# Patient Record
Sex: MALE | Race: WHITE | NOT HISPANIC OR LATINO | Employment: OTHER | URBAN - METROPOLITAN AREA
[De-identification: names, ages, dates, MRNs, and addresses within clinical notes are randomized per-mention and may not be internally consistent; named-entity substitution may affect disease eponyms.]

---

## 2021-01-15 DIAGNOSIS — Z23 NEED FOR VACCINATION: ICD-10-CM

## 2021-01-26 ENCOUNTER — IMMUNIZATION (OUTPATIENT)
Dept: FAMILY PLANNING/WOMEN'S HEALTH CLINIC | Facility: IMMUNIZATION CENTER | Age: 81
End: 2021-01-26
Attending: INTERNAL MEDICINE
Payer: COMMERCIAL

## 2021-01-26 DIAGNOSIS — Z23 NEED FOR VACCINATION: ICD-10-CM

## 2021-01-26 DIAGNOSIS — Z23 ENCOUNTER FOR VACCINATION: Primary | ICD-10-CM

## 2021-01-26 PROCEDURE — 0001A PFIZER SARS-COV-2 VACCINE: CPT

## 2021-01-26 PROCEDURE — 91300 PFIZER SARS-COV-2 VACCINE: CPT

## 2021-02-16 ENCOUNTER — HOSPITAL ENCOUNTER (OUTPATIENT)
Dept: LAB | Facility: MEDICAL CENTER | Age: 81
End: 2021-02-16
Attending: FAMILY MEDICINE
Payer: COMMERCIAL

## 2021-02-16 LAB
25(OH)D3 SERPL-MCNC: 56 NG/ML (ref 30–100)
ALBUMIN SERPL BCP-MCNC: 3.7 G/DL (ref 3.2–4.9)
ALBUMIN/GLOB SERPL: 1.2 G/DL
ALP SERPL-CCNC: 71 U/L (ref 30–99)
ALT SERPL-CCNC: 20 U/L (ref 2–50)
ANION GAP SERPL CALC-SCNC: 8 MMOL/L (ref 7–16)
AST SERPL-CCNC: 18 U/L (ref 12–45)
BASOPHILS # BLD AUTO: 1.1 % (ref 0–1.8)
BASOPHILS # BLD: 0.08 K/UL (ref 0–0.12)
BILIRUB SERPL-MCNC: 0.8 MG/DL (ref 0.1–1.5)
BUN SERPL-MCNC: 18 MG/DL (ref 8–22)
CALCIUM SERPL-MCNC: 9.4 MG/DL (ref 8.5–10.5)
CHLORIDE SERPL-SCNC: 108 MMOL/L (ref 96–112)
CHOLEST SERPL-MCNC: 160 MG/DL (ref 100–199)
CO2 SERPL-SCNC: 28 MMOL/L (ref 20–33)
CREAT SERPL-MCNC: 1.45 MG/DL (ref 0.5–1.4)
EOSINOPHIL # BLD AUTO: 0.11 K/UL (ref 0–0.51)
EOSINOPHIL NFR BLD: 1.5 % (ref 0–6.9)
ERYTHROCYTE [DISTWIDTH] IN BLOOD BY AUTOMATED COUNT: 50.8 FL (ref 35.9–50)
FASTING STATUS PATIENT QL REPORTED: NORMAL
GLOBULIN SER CALC-MCNC: 3.2 G/DL (ref 1.9–3.5)
GLUCOSE SERPL-MCNC: 98 MG/DL (ref 65–99)
HCT VFR BLD AUTO: 55.5 % (ref 42–52)
HDLC SERPL-MCNC: 40 MG/DL
HGB BLD-MCNC: 17.9 G/DL (ref 14–18)
IMM GRANULOCYTES # BLD AUTO: 0.01 K/UL (ref 0–0.11)
IMM GRANULOCYTES NFR BLD AUTO: 0.1 % (ref 0–0.9)
LDLC SERPL CALC-MCNC: 96 MG/DL
LYMPHOCYTES # BLD AUTO: 3.21 K/UL (ref 1–4.8)
LYMPHOCYTES NFR BLD: 44 % (ref 22–41)
MCH RBC QN AUTO: 31.3 PG (ref 27–33)
MCHC RBC AUTO-ENTMCNC: 32.3 G/DL (ref 33.7–35.3)
MCV RBC AUTO: 97.2 FL (ref 81.4–97.8)
MONOCYTES # BLD AUTO: 0.84 K/UL (ref 0–0.85)
MONOCYTES NFR BLD AUTO: 11.5 % (ref 0–13.4)
NEUTROPHILS # BLD AUTO: 3.04 K/UL (ref 1.82–7.42)
NEUTROPHILS NFR BLD: 41.8 % (ref 44–72)
NRBC # BLD AUTO: 0 K/UL
NRBC BLD-RTO: 0 /100 WBC
PLATELET # BLD AUTO: 204 K/UL (ref 164–446)
PMV BLD AUTO: 10.5 FL (ref 9–12.9)
POTASSIUM SERPL-SCNC: 5.1 MMOL/L (ref 3.6–5.5)
PROT SERPL-MCNC: 6.9 G/DL (ref 6–8.2)
RBC # BLD AUTO: 5.71 M/UL (ref 4.7–6.1)
SODIUM SERPL-SCNC: 144 MMOL/L (ref 135–145)
TRIGL SERPL-MCNC: 121 MG/DL (ref 0–149)
WBC # BLD AUTO: 7.3 K/UL (ref 4.8–10.8)

## 2021-02-16 PROCEDURE — 85025 COMPLETE CBC W/AUTO DIFF WBC: CPT

## 2021-02-16 PROCEDURE — 36415 COLL VENOUS BLD VENIPUNCTURE: CPT

## 2021-02-16 PROCEDURE — 82306 VITAMIN D 25 HYDROXY: CPT

## 2021-02-16 PROCEDURE — 80053 COMPREHEN METABOLIC PANEL: CPT

## 2021-02-16 PROCEDURE — 80061 LIPID PANEL: CPT

## 2021-02-18 ENCOUNTER — IMMUNIZATION (OUTPATIENT)
Dept: FAMILY PLANNING/WOMEN'S HEALTH CLINIC | Facility: IMMUNIZATION CENTER | Age: 81
End: 2021-02-18
Attending: INTERNAL MEDICINE
Payer: COMMERCIAL

## 2021-02-18 DIAGNOSIS — Z23 ENCOUNTER FOR VACCINATION: Primary | ICD-10-CM

## 2021-02-18 PROCEDURE — 0002A PFIZER SARS-COV-2 VACCINE: CPT

## 2021-02-18 PROCEDURE — 91300 PFIZER SARS-COV-2 VACCINE: CPT

## 2021-03-16 ENCOUNTER — HOSPITAL ENCOUNTER (OUTPATIENT)
Dept: LAB | Facility: MEDICAL CENTER | Age: 81
End: 2021-03-16
Attending: UROLOGY
Payer: COMMERCIAL

## 2021-03-16 ENCOUNTER — HOSPITAL ENCOUNTER (OUTPATIENT)
Dept: LAB | Facility: MEDICAL CENTER | Age: 81
End: 2021-03-16
Attending: FAMILY MEDICINE
Payer: COMMERCIAL

## 2021-03-16 PROCEDURE — 84153 ASSAY OF PSA TOTAL: CPT

## 2021-03-16 PROCEDURE — 86800 THYROGLOBULIN ANTIBODY: CPT

## 2021-03-16 PROCEDURE — 84270 ASSAY OF SEX HORMONE GLOBUL: CPT

## 2021-03-16 PROCEDURE — 84403 ASSAY OF TOTAL TESTOSTERONE: CPT

## 2021-03-16 PROCEDURE — 84154 ASSAY OF PSA FREE: CPT

## 2021-03-16 PROCEDURE — 36415 COLL VENOUS BLD VENIPUNCTURE: CPT

## 2021-03-16 PROCEDURE — 84481 FREE ASSAY (FT-3): CPT

## 2021-03-16 PROCEDURE — 84402 ASSAY OF FREE TESTOSTERONE: CPT

## 2021-03-16 PROCEDURE — 84443 ASSAY THYROID STIM HORMONE: CPT

## 2021-03-16 PROCEDURE — 84439 ASSAY OF FREE THYROXINE: CPT

## 2021-03-17 LAB
T3FREE SERPL-MCNC: 2.89 PG/ML (ref 2–4.4)
T4 FREE SERPL-MCNC: 1.28 NG/DL (ref 0.93–1.7)
TSH SERPL DL<=0.005 MIU/L-ACNC: 0.42 UIU/ML (ref 0.38–5.33)

## 2021-03-18 LAB — THYROGLOB AB SERPL-ACNC: <0.9 IU/ML (ref 0–4)

## 2021-03-19 LAB
PSA FREE MFR SERPL: 18 %
PSA FREE SERPL-MCNC: 1.3 NG/ML
PSA SERPL-MCNC: 7.4 NG/ML (ref 0–4)
SHBG SERPL-SCNC: 37 NMOL/L (ref 11–80)
TESTOST FREE MFR SERPL: 2 % (ref 1.6–2.9)
TESTOST FREE SERPL-MCNC: 194 PG/ML (ref 47–244)
TESTOST SERPL-MCNC: 972 NG/DL (ref 300–720)

## 2021-05-18 ASSESSMENT — ENCOUNTER SYMPTOMS
RESPIRATORY SYMPTOMS COMMENTS: YES
SHORTNESS OF BREATH: 1
HEMOPTYSIS: 0
WHEEZING: 1
CHEST TIGHTNESS: 1
DYSPNEA AT REST: 0

## 2021-05-24 ENCOUNTER — OFFICE VISIT (OUTPATIENT)
Dept: SLEEP MEDICINE | Facility: MEDICAL CENTER | Age: 81
End: 2021-05-24
Payer: COMMERCIAL

## 2021-05-24 VITALS
DIASTOLIC BLOOD PRESSURE: 86 MMHG | WEIGHT: 230 LBS | OXYGEN SATURATION: 97 % | TEMPERATURE: 97.9 F | RESPIRATION RATE: 14 BRPM | HEART RATE: 78 BPM | SYSTOLIC BLOOD PRESSURE: 134 MMHG | BODY MASS INDEX: 30.48 KG/M2 | HEIGHT: 73 IN

## 2021-05-24 DIAGNOSIS — R06.02 SOB (SHORTNESS OF BREATH): ICD-10-CM

## 2021-05-24 DIAGNOSIS — R07.9 CHEST PAIN, UNSPECIFIED TYPE: ICD-10-CM

## 2021-05-24 DIAGNOSIS — J44.9 CHRONIC OBSTRUCTIVE PULMONARY DISEASE, UNSPECIFIED COPD TYPE (HCC): ICD-10-CM

## 2021-05-24 DIAGNOSIS — G47.34 NOCTURNAL OXYGEN DESATURATION: ICD-10-CM

## 2021-05-24 DIAGNOSIS — Z87.891 FORMER SMOKER: ICD-10-CM

## 2021-05-24 DIAGNOSIS — I73.9 PVD (PERIPHERAL VASCULAR DISEASE) (HCC): ICD-10-CM

## 2021-05-24 PROCEDURE — 99204 OFFICE O/P NEW MOD 45 MIN: CPT | Performed by: INTERNAL MEDICINE

## 2021-05-24 ASSESSMENT — ENCOUNTER SYMPTOMS
NAUSEA: 0
STRIDOR: 0
VOMITING: 0
FOCAL WEAKNESS: 0
DIAPHORESIS: 0
SINUS PAIN: 0
DEPRESSION: 0
TREMORS: 0
NECK PAIN: 0
DIZZINESS: 0
PALPITATIONS: 0
WHEEZING: 1
EYE DISCHARGE: 0
FALLS: 0
WEAKNESS: 0
MYALGIAS: 0
DOUBLE VISION: 0
CHILLS: 0
ORTHOPNEA: 0
SORE THROAT: 0
HEADACHES: 0
WEIGHT LOSS: 0
SPUTUM PRODUCTION: 0
BACK PAIN: 0
PHOTOPHOBIA: 0
SPEECH CHANGE: 0
DIARRHEA: 0
CONSTIPATION: 0
ABDOMINAL PAIN: 0
HEMOPTYSIS: 0
SHORTNESS OF BREATH: 1
COUGH: 1
EYE REDNESS: 0
FEVER: 0
BLURRED VISION: 0
PND: 0
CLAUDICATION: 0
HEARTBURN: 0
EYE PAIN: 0

## 2021-05-24 ASSESSMENT — PAIN SCALES - GENERAL: PAINLEVEL: NO PAIN

## 2021-05-24 ASSESSMENT — FIBROSIS 4 INDEX: FIB4 SCORE: 1.54

## 2021-05-24 NOTE — PROGRESS NOTES
Chief Complaint   Patient presents with   • Establish Care     referral 3/3/21 MARLYS Yang MD DX COPD. previous [facility] and [facility] Pulmonary records in media.    • COPD     last seen 4/21/16 Dr. Washington        HPI: This patient is a 80 y.o. male presenting for evaluation of TSAI, COPD.  Patient's past medical history significant for hypertension, peripheral vascular disease, COPD, nocturnal hypoxia on supplemental oxygen with sleep study from 2015 showing no evidence of MIKA.  He is a former smoker with roughly 50-pack-year history and quit in 2011.  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  He did have significant exposures including asbestos in the past.  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  No family history of autoimmune or pulmonary disease.  He was previously followed by [facility] and symptoms were controlled on Advair 250/50 and short acting bronchodilators.  Pulmonary function testing from 2015 showed FEV1 of 2.22 L or 60% predicted with FEV1/FVC ratio of 66.  There was significant bronchodilator response, normal total lung capacity 95% predicted with mild air trapping and elevated DLCO 136% predicted.  No recent imaging although reportedly he has calcified hilar lymphadenopathy consistent with a history of granulomatous disease.  Patient presents today to [facility].  He was seen previously a couple times at [facility] by Dr. Burkett but has not followed regularly with the pulmonologist since 2016.  No exacerbations requiring prednisone.  Currently he tells me he is more short of breath with activity than in the past.  This has been a development over the past year.  He reports occasional chest burning with activity that resolves with rest.  This can also be brought on by cold air.  He does not use his albuterol frequently but does suffer from some environmental allergies for which he previously took  Aller tech and recently purchased Zyrtec but has not tried this yet.  He also has mild cough in the morning productive of mucus that is worse in the spring and attributed to allergies.    Past Medical History:   Diagnosis Date   • Chest pain 2012    Coronary angiogram showed no evidence of CAD.   • Chronic obstructive pulmonary disease (HCC)    • EMPHYSEMA    • Hypertension    • Left bundle branch block    • PVD (peripheral vascular disease) (HCC)        Social History     Socioeconomic History   • Marital status:      Spouse name: Not on file   • Number of children: Not on file   • Years of education: Not on file   • Highest education level: Not on file   Occupational History   • Not on file   Tobacco Use   • Smoking status: Former Smoker     Packs/day: 1.00     Quit date: 10/4/2011     Years since quittin.6   • Smokeless tobacco: Never Used   Vaping Use   • Vaping Use: Never used   Substance and Sexual Activity   • Alcohol use: No   • Drug use: Not on file   • Sexual activity: Not on file   Other Topics Concern   • Not on file   Social History Narrative   • Not on file     Social Determinants of Health     Financial Resource Strain:    • Difficulty of Paying Living Expenses:    Food Insecurity:    • Worried About Running Out of Food in the Last Year:    • Ran Out of Food in the Last Year:    Transportation Needs:    • Lack of Transportation (Medical):    • Lack of Transportation (Non-Medical):    Physical Activity:    • Days of Exercise per Week:    • Minutes of Exercise per Session:    Stress:    • Feeling of Stress :    Social Connections:    • Frequency of Communication with Friends and Family:    • Frequency of Social Gatherings with Friends and Family:    • Attends Shinto Services:    • Active Member of Clubs or Organizations:    • Attends Club or Organization Meetings:    • Marital Status:    Intimate Partner Violence:    • Fear of Current or Ex-Partner:    • Emotionally Abused:    •  Physically Abused:    • Sexually Abused:        Family History   Problem Relation Age of Onset   • Heart Disease Mother    • Heart Disease Father        Current Outpatient Medications on File Prior to Visit   Medication Sig Dispense Refill   • albuterol (VENTOLIN OR PROVENTIL) 108 (90 BASE) MCG/ACT Aero Soln inhalation aerosol Inhale 2 Puffs by mouth every 6 hours as needed for Shortness of Breath.     • Levothyroxine Sodium 50 MCG CAPS Take 50 mcg by mouth 1 time daily as needed.     • simvastatin (ZOCOR) 10 MG TABS Take 10 mg by mouth every evening.     • Cholecalciferol (VITAMIN D3) 2000 UNIT TABS Take  by mouth.     • olmesartan (BENICAR) 20 MG TABS Take 20 mg by mouth every day.     • clonazepam (KLONOPIN) 1 MG TABS Take 0.5 mg by mouth every day.     • aspirin 81 MG tablet Take 81 mg by mouth every day.     • Naproxen Sodium (ALEVE PO) Take  by mouth.     • fluticasone-salmeterol (ADVAIR) 250-50 MCG/DOSE AEROSOL POWDER, BREATH ACTIVATED Inhale 1 Puff by mouth 2 times a day.     • levofloxacin (LEVAQUIN) 500 MG tablet Take 1 tablet by mouth daily until gone. 10 Tab 0   • metoprolol SR (TOPROL XL) 50 MG TB24 Take 1 Tab by mouth every day. 30 Tab 11   • Vitamins-Lipotropics (B-100 COMPLEX PO) Take  by mouth.     • tiotropium (SPIRIVA) 18 MCG CAPS Inhale 18 mcg by mouth every day.     • docosahexanoic acid (OMEGA 3 FA) 1000 MG CAPS Take 1,000 mg by mouth 3 times a day, with meals.     • Diltiazem HCl Coated Beads (CARDIZEM LA) 180 MG TB24 Take 1 Tab by mouth every day. 30 Tab 6   • omeprazole (PRILOSEC) 20 MG CPDR Take 20 mg by mouth every day.     • carbidopa-levodopa (SINEMET)  MG TABS Take 1 Tab by mouth 3 times a day.       No current facility-administered medications on file prior to visit.       Allergies: Patient has no known allergies.    ROS:   Review of Systems   Constitutional: Negative for chills, diaphoresis, fever, malaise/fatigue and weight loss.   HENT: Negative for congestion, ear discharge,  "ear pain, hearing loss, nosebleeds, sinus pain, sore throat and tinnitus.    Eyes: Negative for blurred vision, double vision, photophobia, pain, discharge and redness.   Respiratory: Positive for cough, shortness of breath and wheezing. Negative for hemoptysis, sputum production and stridor.    Cardiovascular: Negative for chest pain, palpitations, orthopnea, claudication, leg swelling and PND.   Gastrointestinal: Negative for abdominal pain, constipation, diarrhea, heartburn, nausea and vomiting.   Genitourinary: Negative for dysuria and urgency.   Musculoskeletal: Negative for back pain, falls, joint pain, myalgias and neck pain.   Skin: Negative for itching and rash.   Neurological: Negative for dizziness, tremors, speech change, focal weakness, weakness and headaches.   Endo/Heme/Allergies: Negative for environmental allergies.   Psychiatric/Behavioral: Negative for depression.       /86 (BP Location: Right arm, Patient Position: Sitting, BP Cuff Size: Large adult)   Pulse 78   Temp 36.6 °C (97.9 °F) (Temporal)   Resp 14   Ht 1.854 m (6' 1\")   Wt 104 kg (230 lb)   SpO2 97%     Physical Exam:  Physical Exam  Vitals reviewed.   Constitutional:       General: He is not in acute distress.     Appearance: Normal appearance. He is normal weight.   HENT:      Head: Normocephalic and atraumatic.      Right Ear: External ear normal.      Left Ear: External ear normal.      Nose: Nose normal. No congestion.      Mouth/Throat:      Mouth: Mucous membranes are moist.      Pharynx: Oropharynx is clear. No oropharyngeal exudate.   Eyes:      General: No scleral icterus.     Extraocular Movements: Extraocular movements intact.      Conjunctiva/sclera: Conjunctivae normal.      Pupils: Pupils are equal, round, and reactive to light.   Cardiovascular:      Rate and Rhythm: Normal rate and regular rhythm.      Heart sounds: Normal heart sounds. No murmur heard.   No gallop.    Pulmonary:      Effort: Pulmonary effort " is normal. No respiratory distress.      Breath sounds: Normal breath sounds. No wheezing or rales.   Abdominal:      General: There is no distension.      Palpations: Abdomen is soft.   Musculoskeletal:         General: Normal range of motion.      Cervical back: Normal range of motion and neck supple.      Right lower leg: No edema.      Left lower leg: No edema.   Skin:     General: Skin is warm and dry.      Findings: No rash.   Neurological:      Mental Status: He is alert and oriented to person, place, and time.      Cranial Nerves: No cranial nerve deficit.   Psychiatric:         Mood and Affect: Mood normal.         Behavior: Behavior normal.         PFTs as reviewed by me personally: as per HPI    Imaging as reviewed by me personally: as per HPI    Assessment:  1. Chronic obstructive pulmonary disease, unspecified COPD type ([facility])  Overnight Oximetry    DX-CHEST-2 VIEWS   2. Former smoker     3. Nocturnal oxygen desaturation  Overnight Oximetry   4. Chest pain, unspecified type  REFERRAL TO CARDIOLOGY   5. SOB (shortness of breath)  PULMONARY FUNCTION TESTS -Test requested: Complete Pulmonary Function Test    REFERRAL TO CARDIOLOGY    DX-CHEST-2 VIEWS   6. PVD (peripheral vascular disease) ([facility])         Plan:  1.  Mild to moderate based on symptoms and history.  It is not clear to me if his shortness of breath is related to COPD versus other.  Certainly given his history of peripheral vascular disease, he should be evaluated for possible anginal equivalent.  His exacerbation of symptoms with cold air is more indicative of a reactive airways disease.  I recommended updating lung function test, continue Advair 250/50 and have him start using his albuterol prior to activities that exacerbate symptoms.  We will also obtain baseline chest x-ray.  2.  Patient is tobacco free and has been sober for period of 10 years.  Given age she is not a candidate for lung cancer screening.  Encouraged ongoing abstinence.  3.   This is chronic and polysomnograph in 2015 showed no evidence of obstructive sleep apnea.  Patient is interested in rechecking on room air to see if he still requires supplemental oxygen.  Overnight oximetry ordered on room air.  4.  Burning sensation could be reflux, could be reactive airways disease although less likely.  My concern would be for an underlying cardiac issue given his history of peripheral vascular disease.  Treatment for reactive airways disease as per above.  Cardiology referral.  5.  See discussion above.  Somewhat atypical for COPD and could be as simple as deconditioning.  I am recommending short acting bronchodilator prior to activities, updated PFTs, chest x-ray and referral to cardiology.  6.  See discussion above.  Given his underlying PVD, he is at risk for CAD.  I have placed referral to cardiology for formal evaluation.  Return in about 3 months (around 8/24/2021) for COPD, PFTs, CXR, opo on RA.

## 2021-06-03 NOTE — PROGRESS NOTES
Cardiology Initial Consultation Note    Date of note:    6/4/2021    Primary Care Provider: Mohan Yang M.D.  Referring Provider: Barb Henderson M.D.     Patient Name: Jason Pearson     YOB: 1940  MRN:              [id number]    Chief Complaint: Chest pain    History of Present Illness: Mr. Jason Pearson is a 80 y.o. male whose current medical problems include hypertension, left bundle branch block (noted first in EKG 2012), chronic obstructive airway disease, and PAD s/p iliac stent  who is here for cardiac consultation for chest pain.    The patient has a cardiologist years ago.  He was last seen in cardiology clinic by Dr. Samson in August 2012, and thought he was doing well at any active cardiac issues.  Patient denies any prior MI or heart failure history.    The patient returns today as he reported chest burning and shortness of breath to his pulmonologist. The patient reports that over the last year or so, he notices that he gets winded more easily while mowing the lawn and also feels like his chest is burning. With rest and taking inhalers, his symptoms resolve. Symptoms have been stable for the last year. The patient denies any orthopnea, PND, or leg swelling.  No palpitations.  No syncope presyncopal episodes.    Cardiovascular Risk Factors:  1. Smoking status: Former smoker  2. Type II Diabetes Mellitus: None/not recently checked  3. Hypertension: On medication  4. Dyslipidemia: On statin  Cholesterol,Tot   Date Value Ref Range Status   02/16/2021 160 100 - 199 mg/dL Final     LDL   Date Value Ref Range Status   02/16/2021 96 <100 mg/dL Final     HDL   Date Value Ref Range Status   02/16/2021 40 >=40 mg/dL Final     Triglycerides   Date Value Ref Range Status   02/16/2021 121 0 - 149 mg/dL Final     5. Family history of early Coronary Artery Disease in a first degree relative (Male less than 55 years of age; Female less than 65 years of age): None  6.  Obesity and/or Metabolic  Syndrome: BMI 31.43  7. Sedentary lifestyle: Not sedentary     Review of Systems   Constitutional: Negative for fever, malaise/fatigue and night sweats.   Cardiovascular: Positive for dyspnea on exertion. Negative for chest pain, irregular heartbeat, leg swelling, near-syncope, orthopnea, palpitations, paroxysmal nocturnal dyspnea and syncope.   Respiratory: Negative for cough, shortness of breath and wheezing.    Gastrointestinal: Negative for abdominal pain, diarrhea, nausea and vomiting.   Neurological: Negative for dizziness, focal weakness and weakness.     All other systems reviewed and are negative.       Current Outpatient Medications   Medication Sig Dispense Refill   • testosterone cypionate (DEPO-TESTOSTERONE) 200 MG/ML Solution injection testosterone cypionate 200 mg/mL intramuscular oil   INJECT 0.6 ML INTAMUSCULARLY EVERY 2 WEEKS 224 DAYS E29     • albuterol (PROAIR HFA) 108 (90 Base) MCG/ACT Aero Soln inhalation aerosol ProAir HFA 90 mcg/actuation aerosol inhaler   INHALE 1 TO 2 PUFFS BY MOUTH EVERY 4 TO 6 HRS AS NEEDED     • clonazePAM (KLONOPIN) 0.5 MG Tab clonazepam 0.5 mg tablet   TAKE 1 TABLET BY MOUTH THREE TIMES A DAY     • levothyroxine (SYNTHROID) 88 MCG Tab levothyroxine 88 mcg tablet   TAKE 1 TABLET BY MOUTH EVERY DAY     • umeclidinium-vilanterol (ANORO ELLIPTA) 62.5-25 MCG/INH AEROSOL POWDER, BREATH ACTIVATED inhaler Inhale 1 Puff every day.     • Home Care Oxygen Inhale 3 L/min continuous. At night     • albuterol (VENTOLIN OR PROVENTIL) 108 (90 BASE) MCG/ACT Aero Soln inhalation aerosol Inhale 2 Puffs by mouth every 6 hours as needed for Shortness of Breath.     • simvastatin (ZOCOR) 10 MG TABS Take 10 mg by mouth every evening.     • Cholecalciferol (VITAMIN D3) 2000 UNIT TABS Take  by mouth.     • olmesartan (BENICAR) 20 MG TABS Take 20 mg by mouth every day.     • aspirin 81 MG tablet Take 81 mg by mouth every day.     • Naproxen Sodium (ALEVE PO) Take  by mouth.       No current  "facility-administered medications for this visit.         Allergies   Allergen Reactions   • Cat Hair Extract Itching         Physical Exam:  Ambulatory Vitals  /70 (BP Location: Right arm, Patient Position: Sitting, BP Cuff Size: Adult)   Pulse 78   Resp 20   Ht 1.854 m (6' 1\")   Wt 108 kg (238 lb 3.2 oz)   SpO2 93%    Oxygen Therapy:  Pulse Oximetry: 93 %  BP Readings from Last 4 Encounters:   06/04/21 132/70   05/24/21 134/86   04/21/16 126/82   08/10/12 132/76       Weight/BMI: Body mass index is 31.43 kg/m².  Wt Readings from Last 4 Encounters:   06/04/21 108 kg (238 lb 3.2 oz)   05/24/21 104 kg (230 lb)   04/21/16 94.8 kg (209 lb)   08/10/12 103 kg (226 lb)         General: Well appearing and in no apparent distress  Eyes: nl conjunctiva, no icteric sclera  ENT: wearing a mask, normal external appearance of ears  Neck: no visible JVP,  no carotid bruits  Lungs: normal respiratory effort, CTAB  Heart: RRR, no murmurs, no rubs or gallops,  no edema bilateral lower extremities. No LV/RV heave on cardiac palpatation. + bilateral radial pulses.  + bilateral dp pulses.   Abdomen: soft, non tender, non distended, no masses, normal bowel sounds.  No HSM.  Extremities/MSK: no clubbing, no cyanosis  Neurological: No focal sensory deficits  Psychiatric: Appropriate affect, A/O x 3, intact judgement and insight  Skin: Warm extremities      Lab Data Review:  Lab Results   Component Value Date/Time    CHOLSTRLTOT 160 02/16/2021 09:24 AM    LDL 96 02/16/2021 09:24 AM    HDL 40 02/16/2021 09:24 AM    TRIGLYCERIDE 121 02/16/2021 09:24 AM       Lab Results   Component Value Date/Time    SODIUM 144 02/16/2021 09:24 AM    POTASSIUM 5.1 02/16/2021 09:24 AM    CHLORIDE 108 02/16/2021 09:24 AM    CO2 28 02/16/2021 09:24 AM    GLUCOSE 98 02/16/2021 09:24 AM    BUN 18 02/16/2021 09:24 AM    CREATININE 1.45 (H) 02/16/2021 09:24 AM     Lab Results   Component Value Date/Time    ALKPHOSPHAT 71 02/16/2021 09:24 AM    ASTSGOT 18 " 02/16/2021 09:24 AM    ALTSGPT 20 02/16/2021 09:24 AM    TBILIRUBIN 0.8 02/16/2021 09:24 AM      Lab Results   Component Value Date/Time    WBC 7.3 02/16/2021 09:24 AM     No results found for: HBA1C      Cardiac Imaging and Procedures Review:    EKG dated 6/4/2021: My personal interpretation is Sinus rhythm, LBBB    No prior echocardiogram    Assessment & Plan     1. Chest pain, unspecified type  EC-ECHOCARDIOGRAM COMPLETE W/O CONT    NM-CARDIAC STRESS TEST   2. Dyspnea on exertion  EC-ECHOCARDIOGRAM COMPLETE W/O CONT    NM-CARDIAC STRESS TEST   3. Essential hypertension  EC-ECHOCARDIOGRAM COMPLETE W/O CONT   4. Dyslipidemia     5. Left bundle branch block  EC-ECHOCARDIOGRAM COMPLETE W/O CONT    NM-CARDIAC STRESS TEST         Shared Medical Decision Making:    Chest pain  Dyspnea on exertion  Left bundle branch block, old  Symptoms stable for the last year.  Patient with cardiovascular risk factors.  -Obtain echocardiogram to assess LVEF and any structural abnormalities.  -Obtain nuclear stress test to assess ischemia (pharmacologic due to left bundle branch block)  -ER warning signs given    Hypertension  Blood pressure well controlled this visit  -Continue olmesartan    Dyslipidemia  -Continue simvastatin 10 mg daily    All of the patient's excellent questions were answered to the best of my knowledge and to his satisfaction.  It was a pleasure seeing Mr. Jason Pearson in my clinic today. Return in about 2 months (around 8/4/2021). Patient is aware to call the cardiology clinic with any questions or concerns.      Hayes Mccall MD  [facility]  [facility], [address]  [address] [address]  Phone: [phone]  Fax: [phone]

## 2021-06-04 ENCOUNTER — OFFICE VISIT (OUTPATIENT)
Dept: CARDIOLOGY | Facility: MEDICAL CENTER | Age: 81
End: 2021-06-04
Attending: INTERNAL MEDICINE
Payer: COMMERCIAL

## 2021-06-04 VITALS
DIASTOLIC BLOOD PRESSURE: 70 MMHG | BODY MASS INDEX: 31.57 KG/M2 | OXYGEN SATURATION: 93 % | HEIGHT: 73 IN | WEIGHT: 238.2 LBS | HEART RATE: 78 BPM | RESPIRATION RATE: 20 BRPM | SYSTOLIC BLOOD PRESSURE: 132 MMHG

## 2021-06-04 DIAGNOSIS — I10 ESSENTIAL HYPERTENSION: ICD-10-CM

## 2021-06-04 DIAGNOSIS — R07.9 CHEST PAIN, UNSPECIFIED TYPE: ICD-10-CM

## 2021-06-04 DIAGNOSIS — E78.5 DYSLIPIDEMIA: ICD-10-CM

## 2021-06-04 DIAGNOSIS — R06.09 DYSPNEA ON EXERTION: ICD-10-CM

## 2021-06-04 DIAGNOSIS — I44.7 LEFT BUNDLE BRANCH BLOCK: ICD-10-CM

## 2021-06-04 LAB — EKG IMPRESSION: NORMAL

## 2021-06-04 PROCEDURE — 93000 ELECTROCARDIOGRAM COMPLETE: CPT | Performed by: INTERNAL MEDICINE

## 2021-06-04 PROCEDURE — 99244 OFF/OP CNSLTJ NEW/EST MOD 40: CPT | Performed by: STUDENT IN AN ORGANIZED HEALTH CARE EDUCATION/TRAINING PROGRAM

## 2021-06-04 RX ORDER — TRIAMCINOLONE ACETONIDE 1 MG/ML
LOTION TOPICAL
COMMUNITY
End: 2021-06-04

## 2021-06-04 RX ORDER — CLONAZEPAM 0.5 MG/1
0.5 TABLET ORAL
COMMUNITY
Start: 2021-05-18 | End: 2021-06-04

## 2021-06-04 RX ORDER — TESTOSTERONE CYPIONATE 200 MG/ML
INJECTION, SOLUTION INTRAMUSCULAR
COMMUNITY
End: 2023-01-20

## 2021-06-04 RX ORDER — ALBUTEROL SULFATE 90 UG/1
AEROSOL, METERED RESPIRATORY (INHALATION)
Status: ON HOLD | COMMUNITY
End: 2021-08-17

## 2021-06-04 RX ORDER — CLONAZEPAM 0.5 MG/1
1 TABLET ORAL NIGHTLY PRN
COMMUNITY

## 2021-06-04 RX ORDER — LEVOTHYROXINE SODIUM 88 UG/1
88 TABLET ORAL
COMMUNITY

## 2021-06-04 ASSESSMENT — ENCOUNTER SYMPTOMS
PALPITATIONS: 0
COUGH: 0
DYSPNEA ON EXERTION: 1
WHEEZING: 0
VOMITING: 0
SHORTNESS OF BREATH: 0
DIZZINESS: 0
NIGHT SWEATS: 0
WEAKNESS: 0
DIARRHEA: 0
NAUSEA: 0
ORTHOPNEA: 0
ABDOMINAL PAIN: 0
PND: 0
NEAR-SYNCOPE: 0
FOCAL WEAKNESS: 0
SYNCOPE: 0
FEVER: 0
IRREGULAR HEARTBEAT: 0

## 2021-06-04 ASSESSMENT — FIBROSIS 4 INDEX: FIB4 SCORE: 1.54

## 2021-06-22 ENCOUNTER — HOSPITAL ENCOUNTER (OUTPATIENT)
Dept: RADIOLOGY | Facility: MEDICAL CENTER | Age: 81
End: 2021-06-22
Attending: STUDENT IN AN ORGANIZED HEALTH CARE EDUCATION/TRAINING PROGRAM
Payer: COMMERCIAL

## 2021-06-22 DIAGNOSIS — R06.09 DYSPNEA ON EXERTION: ICD-10-CM

## 2021-06-22 DIAGNOSIS — I44.7 LEFT BUNDLE BRANCH BLOCK: ICD-10-CM

## 2021-06-22 DIAGNOSIS — R07.9 CHEST PAIN, UNSPECIFIED TYPE: ICD-10-CM

## 2021-06-22 PROCEDURE — A9502 TC99M TETROFOSMIN: HCPCS

## 2021-06-22 PROCEDURE — 78452 HT MUSCLE IMAGE SPECT MULT: CPT | Mod: 26 | Performed by: STUDENT IN AN ORGANIZED HEALTH CARE EDUCATION/TRAINING PROGRAM

## 2021-06-22 PROCEDURE — 93018 CV STRESS TEST I&R ONLY: CPT | Performed by: STUDENT IN AN ORGANIZED HEALTH CARE EDUCATION/TRAINING PROGRAM

## 2021-06-22 PROCEDURE — 700111 HCHG RX REV CODE 636 W/ 250 OVERRIDE (IP)

## 2021-06-22 RX ORDER — REGADENOSON 0.08 MG/ML
INJECTION, SOLUTION INTRAVENOUS
Status: COMPLETED
Start: 2021-06-22 | End: 2021-06-22

## 2021-06-22 RX ADMIN — REGADENOSON 0.4 MG: 0.08 INJECTION, SOLUTION INTRAVENOUS at 11:00

## 2021-06-25 ENCOUNTER — PATIENT MESSAGE (OUTPATIENT)
Dept: CARDIOLOGY | Facility: MEDICAL CENTER | Age: 81
End: 2021-06-25

## 2021-06-25 DIAGNOSIS — E78.5 DYSLIPIDEMIA: ICD-10-CM

## 2021-06-28 RX ORDER — ATORVASTATIN CALCIUM 40 MG/1
40 TABLET, FILM COATED ORAL EVERY EVENING
Qty: 90 TABLET | Refills: 3 | Status: SHIPPED | OUTPATIENT
Start: 2021-06-28 | End: 2021-08-18 | Stop reason: SDUPTHER

## 2021-06-28 NOTE — PATIENT COMMUNICATION
Prescription Question  Hayes Mccall M.D.  You 3 days ago     Atorvastatin 40mg daily!    Message text      Replied to pt via [employer] regarding MD recommendations, see encounter.    Medication sent to preferred pharmacy as requested.

## 2021-07-19 ENCOUNTER — HOME STUDY (OUTPATIENT)
Dept: SLEEP MEDICINE | Facility: MEDICAL CENTER | Age: 81
End: 2021-07-19
Attending: INTERNAL MEDICINE
Payer: COMMERCIAL

## 2021-07-19 VITALS — WEIGHT: 234 LBS | BODY MASS INDEX: 30.03 KG/M2 | HEIGHT: 74 IN

## 2021-07-19 DIAGNOSIS — J44.9 CHRONIC OBSTRUCTIVE PULMONARY DISEASE, UNSPECIFIED COPD TYPE (HCC): ICD-10-CM

## 2021-07-19 DIAGNOSIS — G47.34 NOCTURNAL OXYGEN DESATURATION: ICD-10-CM

## 2021-07-19 DIAGNOSIS — R06.02 SOB (SHORTNESS OF BREATH): ICD-10-CM

## 2021-07-19 PROCEDURE — 94010 BREATHING CAPACITY TEST: CPT | Performed by: INTERNAL MEDICINE

## 2021-07-19 PROCEDURE — 94726 PLETHYSMOGRAPHY LUNG VOLUMES: CPT | Performed by: INTERNAL MEDICINE

## 2021-07-19 PROCEDURE — 94762 N-INVAS EAR/PLS OXIMTRY CONT: CPT | Performed by: INTERNAL MEDICINE

## 2021-07-19 PROCEDURE — 94729 DIFFUSING CAPACITY: CPT | Performed by: INTERNAL MEDICINE

## 2021-07-19 ASSESSMENT — PULMONARY FUNCTION TESTS
FVC: 3.28
FEV1_PREDICTED: 3.28
FEV1/FVC_PREDICTED: 74
FVC_PERCENT_PREDICTED: 73
FEV1/FVC_PERCENT_LLN: 62
FEV1/FVC_PERCENT_PREDICTED: 79
FEV1/FVC_PERCENT_PREDICTED: 79
FEV1/FVC_PERCENT_PREDICTED: 74
FEV1/FVC: 59
FEV1/FVC: 59
FEV1_LLN: 2.74
FEV1_PERCENT_PREDICTED: 58
FVC_PREDICTED: 4.45
FEV1: 1.93
FVC_LLN: 3.72

## 2021-07-19 ASSESSMENT — FIBROSIS 4 INDEX: FIB4 SCORE: 1.54

## 2021-07-19 NOTE — Clinical Note
We did OPO to see if pt still needs O2 at night and it appears that he does. I have in my notes that he has O2 at night but can we call him to confirm this and let him know he does still need O2. 2LPM is likely adequate. Thank you!

## 2021-07-19 NOTE — PROCEDURES
Tech: Brandi Gonzalez, RRT, CPFT  Tech notes: Good patient effort & cooperation.  Light headedness after FVC.  The results of this test meet the ATS/ERS standards for acceptability & reproducibility.  Test was performed on the ViaView Body Plethysmograph-Elite DX system.  Predicted values were GLI-2012 for spirometry, GLI- 2017 for DLCO, ITS for Lung Volumes.  The DLCO was uncorrected for Hgb.      Interpretation:  Baseline spirometry shows airflow obstruction with FEV1 of 1.93 L or 58% predicted and FEV1/FVC ratio of 59.  Bronchodilator testing was not performed.  Total lung capacity is low normal at 6.45 L or 83% predicted.  No significant air trapping.  Diffusion capacity is preserved at 102% predicted.  Pulmonary function testing shows airflow obstruction.  Flow volume loop is consistent with this.  Given tobacco history likely consistent with COPD.

## 2021-07-25 NOTE — PROCEDURES
This is an overnight oximetry performed on July 19, 2020 for duration of 8 hours and 23 minutes on room air.  Basal SPO2 was 85%.  Patient did spend a total of 460 minutes with saturation less than 88%.  There are areas of clustered desaturation suggesting sleep disordered breathing.  Consider formal sleep eval.

## 2021-07-29 ENCOUNTER — TELEPHONE (OUTPATIENT)
Dept: SLEEP MEDICINE | Facility: MEDICAL CENTER | Age: 81
End: 2021-07-29

## 2021-07-29 NOTE — TELEPHONE ENCOUNTER
Message left for patient that he is to continue oxygen at 2 LPM nocturnal.      Spoke to patient today 7/30/21 11:32 and yes, he did get our message he does have oxygen and will continuing use 2 litters at night.

## 2021-08-10 ENCOUNTER — HOSPITAL ENCOUNTER (OUTPATIENT)
Dept: CARDIOLOGY | Facility: MEDICAL CENTER | Age: 81
End: 2021-08-10
Attending: STUDENT IN AN ORGANIZED HEALTH CARE EDUCATION/TRAINING PROGRAM
Payer: COMMERCIAL

## 2021-08-10 DIAGNOSIS — R06.09 DYSPNEA ON EXERTION: ICD-10-CM

## 2021-08-10 DIAGNOSIS — R07.9 CHEST PAIN, UNSPECIFIED TYPE: ICD-10-CM

## 2021-08-10 DIAGNOSIS — I10 ESSENTIAL HYPERTENSION: ICD-10-CM

## 2021-08-10 DIAGNOSIS — I44.7 LEFT BUNDLE BRANCH BLOCK: ICD-10-CM

## 2021-08-10 LAB
LV EJECT FRACT MOD 2C 99903: 66.03
LV EJECT FRACT MOD 4C 99902: 39.96
LV EJECT FRACT MOD BP 99901: 56.15

## 2021-08-10 PROCEDURE — 93306 TTE W/DOPPLER COMPLETE: CPT

## 2021-08-10 PROCEDURE — 93306 TTE W/DOPPLER COMPLETE: CPT | Mod: 26 | Performed by: STUDENT IN AN ORGANIZED HEALTH CARE EDUCATION/TRAINING PROGRAM

## 2021-08-11 ENCOUNTER — TELEPHONE (OUTPATIENT)
Dept: CARDIOLOGY | Facility: MEDICAL CENTER | Age: 81
End: 2021-08-11

## 2021-08-11 DIAGNOSIS — R06.09 DYSPNEA ON EXERTION: ICD-10-CM

## 2021-08-11 DIAGNOSIS — I10 ESSENTIAL HYPERTENSION: ICD-10-CM

## 2021-08-11 DIAGNOSIS — R07.9 CHEST PAIN, UNSPECIFIED TYPE: ICD-10-CM

## 2021-08-11 NOTE — TELEPHONE ENCOUNTER
----- Message from Elsie Manzano R.N. sent at 8/11/2021  8:42 AM PDT -----    ----- Message -----  From: Hayes Mccall M.D.  Sent: 8/10/2021   4:47 PM PDT  To: BRENNA Paiz,   Could you call the patient and let him know that his EF is mildly depressed?  Could you also check on his symptoms of TSAI?   His stress test only showed fixed defect. If he is having symptoms still, I would recommend proceeding with a  coronary angiogram. If he wants to wait until our appointment and he doesn't have symptoms, fine to discuss at appointment. Thank you!

## 2021-08-11 NOTE — TELEPHONE ENCOUNTER
Called pt to discuss MD recommendations [phone].   Pt states he still has much SOB and TSAI.   Pt is in agreement with  recommendation of proceeding with coronary angiogram.      Pt decision relayed to MD for order.

## 2021-08-12 ENCOUNTER — TELEPHONE (OUTPATIENT)
Dept: CARDIOLOGY | Facility: MEDICAL CENTER | Age: 81
End: 2021-08-12

## 2021-08-12 NOTE — TELEPHONE ENCOUNTER
Hayes Mccall M.D.  You 15 hours ago (5:20 PM)    Thank you, could you help order/arrange coronary angiogram for me? Thank you!!   Message text     ---------------------------------------------------------------------    Order placed per    Staff message to KOFI procedure  in order to schedule procedure.

## 2021-08-12 NOTE — TELEPHONE ENCOUNTER
----- Message from Alyson Virk R.N. sent at 8/12/2021  8:44 AM PDT -----  Please call pt to schedule angiogram  Thank you.

## 2021-08-13 ENCOUNTER — TELEPHONE (OUTPATIENT)
Dept: CARDIOLOGY | Facility: MEDICAL CENTER | Age: 81
End: 2021-08-13

## 2021-08-13 DIAGNOSIS — Z88.8 ALLERGY TO IODINE: Primary | ICD-10-CM

## 2021-08-13 NOTE — TELEPHONE ENCOUNTER
Patient is scheduled on 8-17-21 for a [facility] w/poss with Dr. Mckenzie. No meds to stop and patient to check in at 9:30 for an 11:30 procedure. Updated H&P to be done on admit by NP. Pre admit to call patient. Patient said he had an iodine allergy and message sent to Elsie Mccall's nurse to address.

## 2021-08-13 NOTE — TELEPHONE ENCOUNTER
Called pt [phone]. Pt states in 2012 after a CAT scan on his knee he had 1 episode of emesis after the scan was completed. After his emesis he states he felt better. The technician (not doctor) told pt he could be allergic to the iodine and that's why the threw up. Pt denies any rash, difficulty breathing during this same incident. Iodine not listed on pt current allergy list.     Relayed info to  for recommendation.

## 2021-08-16 ENCOUNTER — PRE-ADMISSION TESTING (OUTPATIENT)
Dept: ADMISSIONS | Facility: MEDICAL CENTER | Age: 81
End: 2021-08-16
Attending: INTERNAL MEDICINE
Payer: COMMERCIAL

## 2021-08-16 DIAGNOSIS — Z01.810 PRE-OPERATIVE CARDIOVASCULAR EXAMINATION: ICD-10-CM

## 2021-08-16 DIAGNOSIS — Z01.812 PRE-OPERATIVE LABORATORY EXAMINATION: ICD-10-CM

## 2021-08-16 LAB
ALBUMIN SERPL BCP-MCNC: 3.9 G/DL (ref 3.2–4.9)
ALBUMIN/GLOB SERPL: 1.3 G/DL
ALP SERPL-CCNC: 85 U/L (ref 30–99)
ALT SERPL-CCNC: 24 U/L (ref 2–50)
ANION GAP SERPL CALC-SCNC: 14 MMOL/L (ref 7–16)
APTT PPP: 31.6 SEC (ref 24.7–36)
AST SERPL-CCNC: 11 U/L (ref 12–45)
BILIRUB SERPL-MCNC: 0.4 MG/DL (ref 0.1–1.5)
BUN SERPL-MCNC: 17 MG/DL (ref 8–22)
CALCIUM SERPL-MCNC: 9.1 MG/DL (ref 8.5–10.5)
CHLORIDE SERPL-SCNC: 103 MMOL/L (ref 96–112)
CO2 SERPL-SCNC: 20 MMOL/L (ref 20–33)
CREAT SERPL-MCNC: 1.28 MG/DL (ref 0.5–1.4)
EKG IMPRESSION: NORMAL
ERYTHROCYTE [DISTWIDTH] IN BLOOD BY AUTOMATED COUNT: 49.6 FL (ref 35.9–50)
GLOBULIN SER CALC-MCNC: 3.1 G/DL (ref 1.9–3.5)
GLUCOSE SERPL-MCNC: 92 MG/DL (ref 65–99)
HCT VFR BLD AUTO: 53.6 % (ref 42–52)
HGB BLD-MCNC: 17.6 G/DL (ref 14–18)
INR PPP: 1.04 (ref 0.87–1.13)
MCH RBC QN AUTO: 30.9 PG (ref 27–33)
MCHC RBC AUTO-ENTMCNC: 32.8 G/DL (ref 33.7–35.3)
MCV RBC AUTO: 94 FL (ref 81.4–97.8)
PLATELET # BLD AUTO: 215 K/UL (ref 164–446)
PMV BLD AUTO: 10.5 FL (ref 9–12.9)
POTASSIUM SERPL-SCNC: 4.8 MMOL/L (ref 3.6–5.5)
PROT SERPL-MCNC: 7 G/DL (ref 6–8.2)
PROTHROMBIN TIME: 13.3 SEC (ref 12–14.6)
RBC # BLD AUTO: 5.7 M/UL (ref 4.7–6.1)
SODIUM SERPL-SCNC: 137 MMOL/L (ref 135–145)
WBC # BLD AUTO: 7.5 K/UL (ref 4.8–10.8)

## 2021-08-16 PROCEDURE — 36415 COLL VENOUS BLD VENIPUNCTURE: CPT

## 2021-08-16 PROCEDURE — 80053 COMPREHEN METABOLIC PANEL: CPT

## 2021-08-16 PROCEDURE — 85730 THROMBOPLASTIN TIME PARTIAL: CPT

## 2021-08-16 PROCEDURE — 93005 ELECTROCARDIOGRAM TRACING: CPT

## 2021-08-16 PROCEDURE — 93010 ELECTROCARDIOGRAM REPORT: CPT | Performed by: INTERNAL MEDICINE

## 2021-08-16 PROCEDURE — 85027 COMPLETE CBC AUTOMATED: CPT

## 2021-08-16 PROCEDURE — 85610 PROTHROMBIN TIME: CPT

## 2021-08-16 RX ORDER — PREDNISONE 20 MG/1
20 TABLET ORAL EVERY 6 HOURS
Status: DISCONTINUED | OUTPATIENT
Start: 2021-08-16 | End: 2021-08-16 | Stop reason: CLARIF

## 2021-08-16 RX ORDER — PREDNISONE 20 MG/1
20 TABLET ORAL 4 TIMES DAILY
Qty: 4 TABLET | Refills: 0 | Status: SHIPPED | OUTPATIENT
Start: 2021-08-16 | End: 2021-08-18

## 2021-08-16 ASSESSMENT — FIBROSIS 4 INDEX: FIB4 SCORE: 1.54

## 2021-08-16 NOTE — PROGRESS NOTES
History:  Primary Diagnosis: Stable Angina      HPI:  Mr Pearson is a 81 y/o male patient of Dr. Mccall with significant history of HTN, LBBB, COPD, and PAD with iliac stent. Recently seen in clinic on 21 with c/o CP, SOB and TSAI. Recent cardiac imaging showed Echo from 8/10/21 with mildly reduced EF of 45% and NM stress test on 21 with small nonreversible defect in the apical septal (LAD) region.    Per Pt he has had coronary angiogram procedure twice, last in  . He followed with our clinic in  with Dr. Molina. He does have iodine allergy and was started on pre Cath prednisone.    Currently patient denies chest pain, dizziness, shortness of breath or palpitations. He is still however having typical anginal symptoms including chest burning/tightness, diaphoresis, and shortness of breath anytime he exerts himself. Symptoms relieve after rest. He has also had pain in his R lower calf area x 3 weeks. He denies swelling, heat, redness, cramping, and numbness. He has noticed improvement with pain since initiation of steroid. Dr. Mccall is also aware and stated ok to proceed with Angio today.     He is accompanied by his wife Antoinette.     Medical history:   Past Medical History:   Diagnosis Date   • Chest pain 2012    Coronary angiogram showed no evidence of CAD.   • Chronic obstructive pulmonary disease (HCC)    • EMPHYSEMA    • Hypertension    • Left bundle branch block    • PVD (peripheral vascular disease) ([facility])        Surgical history:   Past Surgical History:   Procedure Laterality Date   • STENT PLACEMENT  1994    Right iliac stent placement.   • FOOT SURGERY     • SINUSOTOMIES     • SPINAL FORAMINOTOMY         Social history:   Social History     Tobacco Use   Smoking Status Former Smoker   • Packs/day: 1.00   • Quit date: 10/4/2011   • Years since quittin.8   Smokeless Tobacco Never Used      Social History     Substance and Sexual Activity   Alcohol Use No      Social History      Substance and Sexual Activity   Drug Use Not on file        Family history:   Family History   Problem Relation Age of Onset   • Heart Disease Mother    • Heart Disease Father        Allergies:   Allergies   Allergen Reactions   • Cat Hair Extract Itching       Home medications: No current facility-administered medications for this encounter.    Current Outpatient Medications:   •  predniSONE (DELTASONE) 20 MG Tab, Take 1 Tablet by mouth 4 times a day. Take 1 tab every 6 hours. Start first dose at noon, last dose 2 hours before procedure, Disp: 4 Tablet, Rfl: 0  •  atorvastatin (LIPITOR) 40 MG Tab, Take 1 tablet by mouth every evening., Disp: 90 tablet, Rfl: 3  •  testosterone cypionate (DEPO-TESTOSTERONE) 200 MG/ML Solution injection, testosterone cypionate 200 mg/mL intramuscular oil  INJECT 0.6 ML INTAMUSCULARLY EVERY 2 WEEKS 224 DAYS E29, Disp: , Rfl:   •  albuterol (PROAIR HFA) 108 (90 Base) MCG/ACT Aero Soln inhalation aerosol, ProAir HFA 90 mcg/actuation aerosol inhaler  INHALE 1 TO 2 PUFFS BY MOUTH EVERY 4 TO 6 HRS AS NEEDED, Disp: , Rfl:   •  clonazePAM (KLONOPIN) 0.5 MG Tab, clonazepam 0.5 mg tablet  TAKE 1 TABLET BY MOUTH THREE TIMES A DAY, Disp: , Rfl:   •  levothyroxine (SYNTHROID) 88 MCG Tab, levothyroxine 88 mcg tablet  TAKE 1 TABLET BY MOUTH EVERY DAY, Disp: , Rfl:   •  umeclidinium-vilanterol (ANORO ELLIPTA) 62.5-25 MCG/INH AEROSOL POWDER, BREATH ACTIVATED inhaler, Inhale 1 Puff every day., Disp: , Rfl:   •  Home Care Oxygen, Inhale 3 L/min continuous. At night, Disp: , Rfl:   •  albuterol (VENTOLIN OR PROVENTIL) 108 (90 BASE) MCG/ACT Aero Soln inhalation aerosol, Inhale 2 Puffs by mouth every 6 hours as needed for Shortness of Breath., Disp: , Rfl:   •  Cholecalciferol (VITAMIN D3) 2000 UNIT TABS, Take  by mouth., Disp: , Rfl:   •  olmesartan (BENICAR) 20 MG TABS, Take 20 mg by mouth every day., Disp: , Rfl:   •  aspirin 81 MG tablet, Take 81 mg by mouth every day., Disp: , Rfl:   •  Naproxen  Sodium (ALEVE PO), Take  by mouth., Disp: , Rfl:     Review of Systems:  Review of Systems   Constitutional: Positive for diaphoresis (with exertion).   HENT: Negative.    Eyes: Negative.    Respiratory: Positive for shortness of breath. Negative for cough.    Cardiovascular: Positive for chest pain. Negative for palpitations, orthopnea, claudication, leg swelling and PND.   Gastrointestinal: Negative.    Genitourinary: Negative.    Musculoskeletal: Positive for myalgias (R calf ).   Skin: Negative.    Neurological: Negative.    Endo/Heme/Allergies: Negative.    Psychiatric/Behavioral: Negative.        Physical Examination:  Physical Exam  Vitals reviewed.   Constitutional:       Appearance: Normal appearance. He is normal weight.   HENT:      Head: Normocephalic.   Cardiovascular:      Rate and Rhythm: Normal rate and regular rhythm.      Pulses: Normal pulses.      Heart sounds: Normal heart sounds.   Pulmonary:      Effort: Pulmonary effort is normal.      Breath sounds: Normal breath sounds.   Abdominal:      General: Abdomen is flat.      Palpations: Abdomen is soft.   Musculoskeletal:         General: Normal range of motion.      Cervical back: Normal range of motion.      Right lower leg: Edema (at calf, extremity warm, no pain with palpation) present.   Skin:     General: Skin is warm and dry.      Capillary Refill: Capillary refill takes less than 2 seconds.   Neurological:      General: No focal deficit present.      Mental Status: He is alert and oriented to person, place, and time.   Psychiatric:         Mood and Affect: Mood normal.         Behavior: Behavior normal.         Thought Content: Thought content normal.         Judgment: Judgment normal.       Echo 8/10/21  CONCLUSIONS  Mildly reduced left ventricular systolic function. Left ventricular   ejection fraction is visually estimated to be 45%.   Grade I diastolic dysfunction.  Dyskinesis of the interventricular septum, consistent with left bundle    branch block.   Normal right ventricular size and systolic function.  No significant valvular abnormalities.   No prior study is available for comparison.     Impression:    Stable Angina  -CP, diaphoresis, and SOB with exertion continues  -EF of 45% on recent Echo, no WMA   -Fixed defect on NM stress test  -Proceed with Coronary Angiogram with Dr. Mcknezie today    Right calf pain  -most likely MSK given improvement with steroids  -Pt has follow-up with Dr. Mccall scheduled for tomorrow, can discuss further work-up     Plan:  Left cardiac cath with possible intervention with Dr. Mckenzie.     The risks, benefits, and alternatives to coronary angiography with IV sedation were discussed in great detail with patient and wife. Specific risks mentioned include bleeding, infection, kidney damage, allergic reaction, cardiac perforation with possible tamponade requiring pericardiocentesis or possibly open heart surgery. In addition, we discussed that 10% of patients will experience small to moderate bruising at the site of the arterial puncture. Lastly, the risks of heart attack, stroke and death were discussed; the risk of major complications such as heart attack or stroke caused by the angiogram is approximately 1%; the risk of death is approximately 1 in 1000. The patient verbalized understanding of the potential complications and wishes to proceed with this procedure.    The risks/benefits of the procedure will be further discussed with the consenting physician performing the procedure.

## 2021-08-16 NOTE — TELEPHONE ENCOUNTER
Called pt [phone] spoke with pt regarding iodine allergy protocol medication. Prednisone Rx sent to [facility] per pt request. Advised pt to start at noon today. Pt verbalized understanding.

## 2021-08-16 NOTE — OR NURSING
COVID-19 Pre-surgery screening:    Left message to call back for screening, and advised of mask/visitor policies.

## 2021-08-16 NOTE — OR NURSING
At preadmit appointment pt states he has had right calf pain for the last 3 weeks. Denies swelling in that leg. Notified Maureen and Dr. Mckenzie's office regarding this.

## 2021-08-17 ENCOUNTER — HOSPITAL ENCOUNTER (OUTPATIENT)
Facility: MEDICAL CENTER | Age: 81
End: 2021-08-17
Attending: INTERNAL MEDICINE | Admitting: INTERNAL MEDICINE
Payer: COMMERCIAL

## 2021-08-17 ENCOUNTER — APPOINTMENT (OUTPATIENT)
Dept: CARDIOLOGY | Facility: MEDICAL CENTER | Age: 81
End: 2021-08-17
Attending: STUDENT IN AN ORGANIZED HEALTH CARE EDUCATION/TRAINING PROGRAM
Payer: COMMERCIAL

## 2021-08-17 ENCOUNTER — TELEPHONE (OUTPATIENT)
Dept: CARDIOLOGY | Facility: MEDICAL CENTER | Age: 81
End: 2021-08-17

## 2021-08-17 VITALS
OXYGEN SATURATION: 96 % | TEMPERATURE: 97.5 F | BODY MASS INDEX: 31.41 KG/M2 | RESPIRATION RATE: 16 BRPM | HEART RATE: 77 BPM | WEIGHT: 236.99 LBS | DIASTOLIC BLOOD PRESSURE: 67 MMHG | SYSTOLIC BLOOD PRESSURE: 147 MMHG | HEIGHT: 73 IN

## 2021-08-17 DIAGNOSIS — R06.09 DYSPNEA ON EXERTION: ICD-10-CM

## 2021-08-17 DIAGNOSIS — R07.9 CHEST PAIN, UNSPECIFIED TYPE: ICD-10-CM

## 2021-08-17 PROCEDURE — 700117 HCHG RX CONTRAST REV CODE 255: Performed by: INTERNAL MEDICINE

## 2021-08-17 PROCEDURE — 700111 HCHG RX REV CODE 636 W/ 250 OVERRIDE (IP)

## 2021-08-17 PROCEDURE — 93458 L HRT ARTERY/VENTRICLE ANGIO: CPT | Mod: 26 | Performed by: INTERNAL MEDICINE

## 2021-08-17 PROCEDURE — 99152 MOD SED SAME PHYS/QHP 5/>YRS: CPT | Performed by: INTERNAL MEDICINE

## 2021-08-17 PROCEDURE — A9270 NON-COVERED ITEM OR SERVICE: HCPCS | Performed by: INTERNAL MEDICINE

## 2021-08-17 PROCEDURE — 160002 HCHG RECOVERY MINUTES (STAT)

## 2021-08-17 PROCEDURE — 99153 MOD SED SAME PHYS/QHP EA: CPT

## 2021-08-17 PROCEDURE — 700102 HCHG RX REV CODE 250 W/ 637 OVERRIDE(OP): Performed by: INTERNAL MEDICINE

## 2021-08-17 PROCEDURE — 700101 HCHG RX REV CODE 250

## 2021-08-17 RX ORDER — VERAPAMIL HYDROCHLORIDE 2.5 MG/ML
INJECTION, SOLUTION INTRAVENOUS
Status: COMPLETED
Start: 2021-08-17 | End: 2021-08-17

## 2021-08-17 RX ORDER — LIDOCAINE HYDROCHLORIDE 20 MG/ML
INJECTION, SOLUTION INFILTRATION; PERINEURAL
Status: COMPLETED
Start: 2021-08-17 | End: 2021-08-17

## 2021-08-17 RX ORDER — HEPARIN SODIUM 200 [USP'U]/100ML
INJECTION, SOLUTION INTRAVENOUS
Status: COMPLETED
Start: 2021-08-17 | End: 2021-08-17

## 2021-08-17 RX ORDER — SODIUM FLUORIDE 1.1 G/100G
1 CREAM ORAL EVERY EVENING
COMMUNITY
Start: 2021-08-06

## 2021-08-17 RX ORDER — HEPARIN SODIUM 1000 [USP'U]/ML
INJECTION, SOLUTION INTRAVENOUS; SUBCUTANEOUS
Status: COMPLETED
Start: 2021-08-17 | End: 2021-08-17

## 2021-08-17 RX ORDER — DIPHENHYDRAMINE HCL 25 MG
50 TABLET ORAL ONCE
Status: COMPLETED | OUTPATIENT
Start: 2021-08-17 | End: 2021-08-17

## 2021-08-17 RX ORDER — MIDAZOLAM HYDROCHLORIDE 1 MG/ML
INJECTION INTRAMUSCULAR; INTRAVENOUS
Status: COMPLETED
Start: 2021-08-17 | End: 2021-08-17

## 2021-08-17 RX ADMIN — FENTANYL CITRATE 100 MCG: 50 INJECTION, SOLUTION INTRAMUSCULAR; INTRAVENOUS at 11:41

## 2021-08-17 RX ADMIN — HEPARIN SODIUM: 1000 INJECTION, SOLUTION INTRAVENOUS; SUBCUTANEOUS at 11:38

## 2021-08-17 RX ADMIN — LIDOCAINE HYDROCHLORIDE: 20 INJECTION, SOLUTION INFILTRATION; PERINEURAL at 11:38

## 2021-08-17 RX ADMIN — HEPARIN SODIUM 2000 UNITS: 200 INJECTION, SOLUTION INTRAVENOUS at 11:34

## 2021-08-17 RX ADMIN — IOHEXOL 50 ML: 350 INJECTION, SOLUTION INTRAVENOUS at 11:55

## 2021-08-17 RX ADMIN — NITROGLYCERIN 10 ML: 20 INJECTION INTRAVENOUS at 11:38

## 2021-08-17 RX ADMIN — MIDAZOLAM 2 MG: 1 INJECTION, SOLUTION INTRAMUSCULAR; INTRAVENOUS at 11:41

## 2021-08-17 RX ADMIN — DIPHENHYDRAMINE HYDROCHLORIDE 50 MG: 25 TABLET ORAL at 10:49

## 2021-08-17 RX ADMIN — FENTANYL CITRATE 75 MCG: 50 INJECTION, SOLUTION INTRAMUSCULAR; INTRAVENOUS at 11:45

## 2021-08-17 ASSESSMENT — FIBROSIS 4 INDEX: FIB4 SCORE: 0.81

## 2021-08-17 ASSESSMENT — ENCOUNTER SYMPTOMS
ORTHOPNEA: 0
NEUROLOGICAL NEGATIVE: 1
FEVER: 0
CLAUDICATION: 0
PALPITATIONS: 0
NIGHT SWEATS: 0
PND: 0
ABDOMINAL PAIN: 0
PALPITATIONS: 0
ORTHOPNEA: 0
MYALGIAS: 1
COUGH: 0
VOMITING: 0
NEAR-SYNCOPE: 0
GASTROINTESTINAL NEGATIVE: 1
WEAKNESS: 0
SYNCOPE: 0
WHEEZING: 0
SHORTNESS OF BREATH: 1
PND: 0
DYSPNEA ON EXERTION: 1
COUGH: 0
DIAPHORESIS: 1
IRREGULAR HEARTBEAT: 0
SHORTNESS OF BREATH: 0
NAUSEA: 0
EYES NEGATIVE: 1
PSYCHIATRIC NEGATIVE: 1
DIZZINESS: 0
FOCAL WEAKNESS: 0
DIARRHEA: 0

## 2021-08-17 NOTE — PROGRESS NOTES
Cardiology Follow up Consultation Note    Date of note:    08/18/21  Primary Care Provider: Mohan Yang M.D.    Patient Name: Jason Pearson     YOB: 1940  MRN:              [id number]    Chief Complaint: Follow-up chest pain and shortness of breath    History of Present Illness: Mr. Jason Pearson is a 80 y.o. male whose current medical problems include hypertension, left bundle branch block (noted first in EKG 2012), chronic obstructive airway disease, and PAD s/p iliac stent  who is here for follow-up chest pain and shortness of breath.    The patient was last seen in my clinic on 6/4/2021. During the last visit, he reported chest burning and shortness of breath, and was sent to cardiology by his pulmonologist.  The patient underwent a nuclear stress test, which showed a small nonreversible defect with depressed LVEF.  Echocardiogram showed LVEF 45%.  As such, the patient underwent a coronary angiogram on 8/17/2021, which showed minimal luminal coronary artery disease.    The patient returns today for follow-up.  He reports similar symptoms as prior.  He reports chest burning and shortness of breath with exertion still.  The patient denies any orthopnea, PND, or leg swelling.  No palpitations.  No syncope presyncopal episodes.    Cardiovascular Risk Factors:  1. Smoking status: Former smoker  2. Type II Diabetes Mellitus: None/not recently checked  3. Hypertension: On medication  4. Dyslipidemia: On statin  Cholesterol,Tot   Date Value Ref Range Status   02/16/2021 160 100 - 199 mg/dL Final     LDL   Date Value Ref Range Status   02/16/2021 96 <100 mg/dL Final     HDL   Date Value Ref Range Status   02/16/2021 40 >=40 mg/dL Final     Triglycerides   Date Value Ref Range Status   02/16/2021 121 0 - 149 mg/dL Final     5. Family history of early Coronary Artery Disease in a first degree relative (Male less than 55 years of age; Female less than 65 years of age): None  6.  Obesity and/or  Metabolic Syndrome: BMI 31.43  7. Sedentary lifestyle: Not sedentary     Review of Systems   Constitutional: Negative for fever, malaise/fatigue and night sweats.   Cardiovascular: Positive for dyspnea on exertion. Negative for chest pain, irregular heartbeat, leg swelling, near-syncope, orthopnea, palpitations, paroxysmal nocturnal dyspnea and syncope.   Respiratory: Negative for cough, shortness of breath and wheezing.    Gastrointestinal: Negative for abdominal pain, diarrhea, nausea and vomiting.   Neurological: Negative for dizziness, focal weakness and weakness.     All other systems reviewed and are negative.       Current Outpatient Medications   Medication Sig Dispense Refill   • metoprolol SR (TOPROL XL) 25 MG TABLET SR 24 HR Take 1 Tablet by mouth every day. 90 Tablet 3   • losartan (COZAAR) 25 MG Tab Take 1 Tablet by mouth every day. 90 Tablet 3   • atorvastatin (LIPITOR) 40 MG Tab Take 1 Tablet by mouth every evening. 90 Tablet 3   • DENTA 5000 PLUS 1.1 % Cream Take 1 Application by mouth every evening. USE AS DIRECTED     • Polyvinyl Alcohol-Povidone (REFRESH OP) Administer 2 Drops into both eyes 2 times a day.     • testosterone cypionate (DEPO-TESTOSTERONE) 200 MG/ML Solution injection Inject  into the shoulder, thigh, or buttocks every 14 days. 0.6 ml     • clonazePAM (KLONOPIN) 0.5 MG Tab Take 1 mg by mouth at bedtime as needed.     • levothyroxine (SYNTHROID) 88 MCG Tab Take 88 mcg by mouth every morning on an empty stomach.     • umeclidinium-vilanterol (ANORO ELLIPTA) 62.5-25 MCG/INH AEROSOL POWDER, BREATH ACTIVATED inhaler Inhale 1 Puff every day.     • Home Care Oxygen Inhale at bedtime. At night      • Cholecalciferol (VITAMIN D3) 2000 UNIT TABS Take 1 Tablet by mouth every morning.     • aspirin 81 MG EC tablet Take 81 mg by mouth at bedtime.       No current facility-administered medications for this visit.         Allergies   Allergen Reactions   • Cat Hair Extract Itching   • Iodine       "Vomiting          Physical Exam:  Ambulatory Vitals  /70 (BP Location: Left arm, Patient Position: Sitting, BP Cuff Size: Adult)   Pulse 73   Resp 14   Ht 1.867 m (6' 1.5\")   Wt 109 kg (241 lb)   SpO2 94%    Oxygen Therapy:  Pulse Oximetry: 94 %  BP Readings from Last 4 Encounters:   08/18/21 122/70   08/17/21 147/67   06/04/21 132/70   05/24/21 134/86       Weight/BMI: Body mass index is 31.36 kg/m².  Wt Readings from Last 4 Encounters:   08/18/21 109 kg (241 lb)   08/17/21 107 kg (236 lb 15.9 oz)   07/19/21 106 kg (234 lb)   06/04/21 108 kg (238 lb 3.2 oz)         General: Well appearing and in no apparent distress  Eyes: nl conjunctiva, no icteric sclera  ENT: wearing a mask, normal external appearance of ears  Neck: no visible JVP,  no carotid bruits  Lungs: normal respiratory effort, CTAB  Heart: RRR, no murmurs, no rubs or gallops,  no edema bilateral lower extremities. No LV/RV heave on cardiac palpatation. + bilateral radial pulses.  + bilateral dp pulses.   Abdomen: soft, non tender, non distended, no masses, normal bowel sounds.  No HSM.  Extremities/MSK: no clubbing, no cyanosis  Neurological: No focal sensory deficits  Psychiatric: Appropriate affect, A/O x 3, intact judgement and insight  Skin: Warm extremities      Lab Data Review:  Lab Results   Component Value Date/Time    CHOLSTRLTOT 160 02/16/2021 09:24 AM    LDL 96 02/16/2021 09:24 AM    HDL 40 02/16/2021 09:24 AM    TRIGLYCERIDE 121 02/16/2021 09:24 AM       Lab Results   Component Value Date/Time    SODIUM 137 08/16/2021 03:24 PM    POTASSIUM 4.8 08/16/2021 03:24 PM    CHLORIDE 103 08/16/2021 03:24 PM    CO2 20 08/16/2021 03:24 PM    GLUCOSE 92 08/16/2021 03:24 PM    BUN 17 08/16/2021 03:24 PM    CREATININE 1.28 08/16/2021 03:24 PM     Lab Results   Component Value Date/Time    ALKPHOSPHAT 85 08/16/2021 03:24 PM    ASTSGOT 11 (L) 08/16/2021 03:24 PM    ALTSGPT 24 08/16/2021 03:24 PM    TBILIRUBIN 0.4 08/16/2021 03:24 PM      Lab " Results   Component Value Date/Time    WBC 7.5 08/16/2021 03:24 PM     No results found for: HBA1C      Cardiac Imaging and Procedures Review:    EKG dated 6/4/2021: My personal interpretation is Sinus rhythm, LBBB    Coronary angiogram 8/17/2021  HEMODYNAMICS:   1. Aortic pressure: 147/66 mmHg  2. Pre A-wave pressure: 15 mmHg  3. No significant aortic gradient on pullback     CORONARY ANGIOGRAPHY:  1. The left main coronary artery is a short, patent vessel that bifurcates into the left anterior descending and left circumflex coronary arteries.  2. The left anterior descending coronary artery a large, transapical vessel that supplies a moderate first diagonal branch, a moderate second diagonal branch, and a large third diagonal branch and has minimal luminal irregularities in the distribution.  3. The left circumflex coronary artery is a large, nondominant vessel that supplies a small first obtuse marginal branch, a moderate second obtuse marginal branch, and a large third obtuse marginal branch and has minimal luminal irregularities in the distribution.  4. The right coronary artery is a large, dominant vessel that supplies a large posterior descending artery and a large posterolateral system and has minimal luminal irregularities in the distribution.     IMPRESSION:  1. Minimal luminal coronary artery disease  2. Borderline elevated left heart filling pressures     RECOMMENDATIONS:  1. Recover in post procedure unit  2. TR band release per protocol  3. Continue optimal medical therapy for nonischemic cardiomyopathy  4. Aggressive cardiac risk factor management  5.   Echocardiogram 8/10/2021  CONCLUSIONS  Mildly reduced left ventricular systolic function. Left ventricular   ejection fraction is visually estimated to be 45%.   Grade I diastolic dysfunction.  Dyskinesis of the interventricular septum, consistent with left bundle   branch block.   Normal right ventricular size and systolic function.  No significant  valvular abnormalities.   No prior study is available for comparison.     Nuclear stress test 6/22/2021  NUCLEAR IMAGING INTERPRETATION   Small nonreversible defect in the apical septal (LAD) region.     No reversible ischemia.    Global hypokinesis with LVEF 33% (however, LVEF is better assessed via    echocardiogram).    Assessment & Plan     1. Dyslipidemia  atorvastatin (LIPITOR) 40 MG Tab   2. HFrEF (heart failure with reduced ejection fraction) ([facility])     3. Nonischemic cardiomyopathy ([facility])     4. Right leg pain  US-EXTREMITY ARTERY LOWER UNILAT RIGHT    CANCELED: Basic Metabolic Panel    CANCELED: MAGNESIUM   5. ACC/AHA stage B heart failure ([facility])     6. Chronic systolic congestive heart failure, NYHA class 2 ([facility])           Shared Medical Decision Making:    Mild HFrEF  Nonischemic cardiomyopathy  NYHA class II stage B heart failure  Coronary angiogram 8/17/2021 showed minimal luminal coronary disease.  Euvolemic on exam without diuretics.  Etiology of shortness of breath is likely multifactorial, likely contributed by both COPD and mild HFrEF.  -Continue aspirin and high intensity statin  -Start Toprol 25 mg daily  -Change for olmesartan to losartan 25 mg daily  -Repeat echocardiogram in 6 months (order next visit)    Hypertension  Blood pressure well controlled this visit  -Toprol and losartan as above    Dyslipidemia  -Continue atorvastatin 40 mg daily    Right leg pain  History of PAD status post iliac stent  The patient reported right leg pain that woke him up from sleep.  Electrolytes was normal.  The patient reports that leg pain improved with prednisone, and now starting to feel pain again.  -We will obtain right leg arterial ultrasound to assess for any vascular abnormalities  -Continue aspirin and statin as above      All of the patient's excellent questions were answered to the best of my knowledge and to his satisfaction.  It was a pleasure seeing Mr. Jason Pearson in my clinic today. Return in  about 3 months (around 11/18/2021). Patient is aware to call the cardiology clinic with any questions or concerns.      Hayes Mccall MD  [facility], [facility]  [address] [place] [id number]  Phone: [phone]  Fax: [phone]

## 2021-08-17 NOTE — DISCHARGE INSTRUCTIONS
ACTIVITY: Rest and take it easy for the first 24 hours.  A responsible adult is recommended to remain with you during that time.  It is normal to feel sleepy.  We encourage you to not do anything that requires balance, judgment or coordination.    MILD FLU-LIKE SYMPTOMS ARE NORMAL. YOU MAY EXPERIENCE GENERALIZED MUSCLE ACHES, THROAT IRRITATION, HEADACHE AND/OR SOME NAUSEA.    FOR 24 HOURS DO NOT:  Drive, operate machinery or run household appliances.  Drink beer or alcoholic beverages.   Make important decisions or sign legal documents.    SPECIAL INSTRUCTIONS: limited movement to R wrist for several days    DIET: To avoid nausea, slowly advance diet as tolerated, avoiding spicy or greasy foods for the first day.  Add more substantial food to your diet according to your physician's instructions.  INCREASE FLUIDS AND FIBER TO AVOID CONSTIPATION.    SURGICAL DRESSING/BATHING:  REMOVE DRSG IN 24 HOURS Shower ok no submerge in water for 7 days    FOLLOW-UP APPOINTMENT:  A follow-up appointment should be arranged with your doctor in 1 week; call to schedule.    You should CALL YOUR PHYSICIAN if you develop:  Fever greater than 101 degrees F.  Pain not relieved by medication, or persistent nausea or vomiting.  Excessive bleeding (blood soaking through dressing) or unexpected drainage from the wound.  Extreme redness or swelling around the incision site, drainage of pus or foul smelling drainage.  Inability to urinate or empty your bladder within 8 hours.  Problems with breathing or chest pain.    You should call 911 if you develop problems with breathing or chest pain.    If you are unable to contact your doctor or surgical center, you should go to the nearest emergency room or urgent care center.  Physician's telephone #: [phone]    If any questions arise, call your doctor.  If your doctor is not available, please feel free to call the Surgical Center at [phone]. The Contact Center is open Monday through  Friday 7AM to 5PM and may speak to a nurse at [phone], or toll free at [phone].     A registered nurse may call you a few days after your surgery to see how you are doing after your procedure.    MEDICATIONS: Resume taking daily medication.  Take prescribed pain medication with food.  If no medication is prescribed, you may take non-aspirin pain medication if needed.      If your physician has prescribed pain medication that includes Acetaminophen (Tylenol), do not take additional Acetaminophen (Tylenol) while taking the prescribed medication.    Depression / Suicide Risk    As you are discharged from this [facility] facility, it is important to learn how to keep safe from harming yourself.    Recognize the warning signs:  · Abrupt changes in personality, positive or negative- including increase in energy   · Giving away possessions  · Change in eating patterns- significant weight changes-  positive or negative  · Change in sleeping patterns- unable to sleep or sleeping all the time   · Unwillingness or inability to communicate  · Depression  · Unusual sadness, discouragement and loneliness  · Talk of wanting to die  · Neglect of personal appearance   · Rebelliousness- reckless behavior  · Withdrawal from people/activities they love  · Confusion- inability to concentrate     If you or a loved one observes any of these behaviors or has concerns about self-harm, here's what you can do:  · Talk about it- your feelings and reasons for harming yourself  · Remove any means that you might use to hurt yourself (examples: pills, rope, extension cords, firearm)  · Get professional help from the community (Mental Health, Substance Abuse, psychological counseling)  · Do not be alone:Call your Safe Contact- someone whom you trust who will be there for you.  · Call your local CRISIS HOTLINE 181-9442 or 709-885-2268  · Call your local Children's Mobile Crisis Response Team [place] (500) 469-1632 or  www.[employer]  · Call the toll free National Suicide Prevention Hotlines   · National Suicide Prevention Lifeline 978-809-KFTS (0421)  · National Hope Line Network 800-SUICIDE (348-7191)

## 2021-08-17 NOTE — TELEPHONE ENCOUNTER
----- Message from Elsie Manzano R.N. sent at 8/16/2021  5:20 PM PDT -----  Regarding: FW: Calf pain X3 weeks with angio tomorrow    ----- Message -----  From: Maureen Hewitt  Sent: 8/16/2021   3:41 PM PDT  To: Elsie Manzano R.N.  Subject: Calf pain X3 weeks with angio tomorrow           Pre admit sent me a message saying this patient said he has had calf pain for about 3 weeks now and is scheduled for a angio tomorrow 8-17-21.

## 2021-08-17 NOTE — TELEPHONE ENCOUNTER
Called pt [phone]  Spoke with Dr Mccall regarding calf pain. Per Dr Mccall if pain is worsening, or pt unable to feel leg, then pt should go to ER. If not, then okay to proceed with angio scheduled for today.    Spoke with pt. Per pt calf pain is getting better and will proceed with angio today. Advised pt to seek emergent care, pt verbalized understanding. Pt will discuss pain with HK during 8/18 appt.

## 2021-08-17 NOTE — OR NURSING
1430 Pt's VSS; denies N/V denies pain. Dressing CDI to R radial band site. CMS intact to L hand.  1500 Dc instructions reviewed  With pt and spouse. Discharged via WC with RN.

## 2021-08-17 NOTE — OR NURSING
1209 Patient arrived to unit, awake and alert.  Right radial TR band site clean, dry and soft.  Patient denies pain at this time.  Updated on plan of care, verbalized understanding.  1215 Wife Antoinette called and updated on patient status.  1257 1 ml air removed from TR band, no bleeding to site.  1315 2 ml air removed from TR band, no bleeding to site.  1327 2 ml air removed from TR band, no bleeding to site.  1350 3 ml air removed from TR band, no bleeding to site.  1408 TR band removed, no bleeding to site.  Dressing placed.  1415 Report to Britni ADHIKARI.  1423 Patient transferred to Phase 2.

## 2021-08-18 ENCOUNTER — OFFICE VISIT (OUTPATIENT)
Dept: CARDIOLOGY | Facility: MEDICAL CENTER | Age: 81
End: 2021-08-18
Payer: COMMERCIAL

## 2021-08-18 VITALS
WEIGHT: 241 LBS | BODY MASS INDEX: 30.93 KG/M2 | OXYGEN SATURATION: 94 % | RESPIRATION RATE: 14 BRPM | DIASTOLIC BLOOD PRESSURE: 70 MMHG | HEIGHT: 74 IN | SYSTOLIC BLOOD PRESSURE: 122 MMHG | HEART RATE: 73 BPM

## 2021-08-18 DIAGNOSIS — I42.8 NONISCHEMIC CARDIOMYOPATHY (HCC): ICD-10-CM

## 2021-08-18 DIAGNOSIS — M79.604 RIGHT LEG PAIN: ICD-10-CM

## 2021-08-18 DIAGNOSIS — I50.20 HFREF (HEART FAILURE WITH REDUCED EJECTION FRACTION) (HCC): ICD-10-CM

## 2021-08-18 DIAGNOSIS — E78.5 DYSLIPIDEMIA: ICD-10-CM

## 2021-08-18 DIAGNOSIS — I50.9 ACC/AHA STAGE B HEART FAILURE (HCC): ICD-10-CM

## 2021-08-18 DIAGNOSIS — I50.22 CHRONIC SYSTOLIC CONGESTIVE HEART FAILURE, NYHA CLASS 2 (HCC): ICD-10-CM

## 2021-08-18 PROCEDURE — 99214 OFFICE O/P EST MOD 30 MIN: CPT | Performed by: STUDENT IN AN ORGANIZED HEALTH CARE EDUCATION/TRAINING PROGRAM

## 2021-08-18 RX ORDER — ATORVASTATIN CALCIUM 40 MG/1
40 TABLET, FILM COATED ORAL EVERY EVENING
Qty: 90 TABLET | Refills: 3 | Status: SHIPPED | OUTPATIENT
Start: 2021-08-18 | End: 2022-06-30 | Stop reason: SDUPTHER

## 2021-08-18 RX ORDER — METOPROLOL SUCCINATE 25 MG/1
25 TABLET, EXTENDED RELEASE ORAL DAILY
Qty: 90 TABLET | Refills: 3 | Status: SHIPPED | OUTPATIENT
Start: 2021-08-18 | End: 2021-11-10

## 2021-08-18 RX ORDER — LOSARTAN POTASSIUM 25 MG/1
25 TABLET ORAL DAILY
Qty: 90 TABLET | Refills: 3 | Status: SHIPPED | OUTPATIENT
Start: 2021-08-18 | End: 2022-02-04

## 2021-08-18 ASSESSMENT — FIBROSIS 4 INDEX: FIB4 SCORE: 0.81

## 2021-08-18 NOTE — PATIENT INSTRUCTIONS
Stop benicar.  Start losartan 25 mg daily and metoprolol xl 25mg daily.   Schedule US of right leg.

## 2021-08-19 ENCOUNTER — TELEPHONE (OUTPATIENT)
Dept: CARDIOLOGY | Facility: MEDICAL CENTER | Age: 81
End: 2021-08-19

## 2021-08-19 DIAGNOSIS — M79.604 RIGHT LEG PAIN: ICD-10-CM

## 2021-08-19 NOTE — TELEPHONE ENCOUNTER
Spoke with Hillary Moody ext [phone] regarding US orders.  Per Hillary US re-schuled and attached to US-EXTREMITY ARTERY LOWER BILAT W/ERIBERTO. Dr Mccall notified.  Received notification from Hillary Moody regarding confirmation of 8/20 appt.  US-venous and arterial unilat testings cancelled.

## 2021-08-19 NOTE — TELEPHONE ENCOUNTER
----- Message from Elsie Manzano R.N. sent at 8/19/2021  9:03 AM PDT -----  Regarding: FW: Ultrasound order    ----- Message -----  From: Hayes Mccall M.D.  Sent: 8/19/2021   8:07 AM PDT  To: Elsie Manzano R.N.  Subject: Ultrasound order                                 Hi Elsie,  I'm not sure who changed my US order but it is supposed to be arterial because he has PVD and stent to iliac artery - could you make sure that the active order is the arterial one? Thank you so much!!!  -HK

## 2021-08-20 ENCOUNTER — APPOINTMENT (OUTPATIENT)
Dept: RADIOLOGY | Facility: MEDICAL CENTER | Age: 81
End: 2021-08-20
Attending: STUDENT IN AN ORGANIZED HEALTH CARE EDUCATION/TRAINING PROGRAM
Payer: COMMERCIAL

## 2021-08-20 DIAGNOSIS — M79.604 RIGHT LEG PAIN: ICD-10-CM

## 2021-08-20 PROCEDURE — 93922 UPR/L XTREMITY ART 2 LEVELS: CPT

## 2021-08-20 PROCEDURE — 93926 LOWER EXTREMITY STUDY: CPT | Mod: RT

## 2021-09-07 ENCOUNTER — HOSPITAL ENCOUNTER (OUTPATIENT)
Dept: LAB | Facility: MEDICAL CENTER | Age: 81
End: 2021-09-07
Attending: FAMILY MEDICINE
Payer: COMMERCIAL

## 2021-09-07 ENCOUNTER — HOSPITAL ENCOUNTER (OUTPATIENT)
Dept: LAB | Facility: MEDICAL CENTER | Age: 81
End: 2021-09-07
Attending: PHYSICIAN ASSISTANT
Payer: COMMERCIAL

## 2021-09-07 ENCOUNTER — HOSPITAL ENCOUNTER (OUTPATIENT)
Dept: RADIOLOGY | Facility: MEDICAL CENTER | Age: 81
End: 2021-09-07
Attending: INTERNAL MEDICINE
Payer: COMMERCIAL

## 2021-09-07 DIAGNOSIS — J44.9 CHRONIC OBSTRUCTIVE PULMONARY DISEASE, UNSPECIFIED COPD TYPE (HCC): ICD-10-CM

## 2021-09-07 DIAGNOSIS — R06.02 SOB (SHORTNESS OF BREATH): ICD-10-CM

## 2021-09-07 LAB
25(OH)D3 SERPL-MCNC: 51 NG/ML (ref 30–100)
ALBUMIN SERPL BCP-MCNC: 3.8 G/DL (ref 3.2–4.9)
ALBUMIN/GLOB SERPL: 1.2 G/DL
ALP SERPL-CCNC: 79 U/L (ref 30–99)
ALT SERPL-CCNC: 18 U/L (ref 2–50)
ANION GAP SERPL CALC-SCNC: 9 MMOL/L (ref 7–16)
AST SERPL-CCNC: 16 U/L (ref 12–45)
BASOPHILS # BLD AUTO: 1.4 % (ref 0–1.8)
BASOPHILS # BLD: 0.08 K/UL (ref 0–0.12)
BILIRUB SERPL-MCNC: 0.7 MG/DL (ref 0.1–1.5)
BUN SERPL-MCNC: 18 MG/DL (ref 8–22)
CALCIUM SERPL-MCNC: 9.1 MG/DL (ref 8.5–10.5)
CHLORIDE SERPL-SCNC: 105 MMOL/L (ref 96–112)
CHOLEST SERPL-MCNC: 113 MG/DL (ref 100–199)
CO2 SERPL-SCNC: 23 MMOL/L (ref 20–33)
CREAT SERPL-MCNC: 1.21 MG/DL (ref 0.5–1.4)
EOSINOPHIL # BLD AUTO: 0.15 K/UL (ref 0–0.51)
EOSINOPHIL NFR BLD: 2.6 % (ref 0–6.9)
ERYTHROCYTE [DISTWIDTH] IN BLOOD BY AUTOMATED COUNT: 54 FL (ref 35.9–50)
FOLATE SERPL-MCNC: 16.3 NG/ML
GLOBULIN SER CALC-MCNC: 3.1 G/DL (ref 1.9–3.5)
GLUCOSE SERPL-MCNC: 93 MG/DL (ref 65–99)
HCT VFR BLD AUTO: 55.4 % (ref 42–52)
HDLC SERPL-MCNC: 42 MG/DL
HGB BLD-MCNC: 17.7 G/DL (ref 14–18)
IMM GRANULOCYTES # BLD AUTO: 0.01 K/UL (ref 0–0.11)
IMM GRANULOCYTES NFR BLD AUTO: 0.2 % (ref 0–0.9)
LDLC SERPL CALC-MCNC: 55 MG/DL
LYMPHOCYTES # BLD AUTO: 1.94 K/UL (ref 1–4.8)
LYMPHOCYTES NFR BLD: 33.8 % (ref 22–41)
MCH RBC QN AUTO: 30.8 PG (ref 27–33)
MCHC RBC AUTO-ENTMCNC: 31.9 G/DL (ref 33.7–35.3)
MCV RBC AUTO: 96.5 FL (ref 81.4–97.8)
MONOCYTES # BLD AUTO: 0.74 K/UL (ref 0–0.85)
MONOCYTES NFR BLD AUTO: 12.9 % (ref 0–13.4)
NEUTROPHILS # BLD AUTO: 2.82 K/UL (ref 1.82–7.42)
NEUTROPHILS NFR BLD: 49.1 % (ref 44–72)
NRBC # BLD AUTO: 0 K/UL
NRBC BLD-RTO: 0 /100 WBC
PLATELET # BLD AUTO: 236 K/UL (ref 164–446)
PMV BLD AUTO: 10.8 FL (ref 9–12.9)
POTASSIUM SERPL-SCNC: 4.5 MMOL/L (ref 3.6–5.5)
PROT SERPL-MCNC: 6.9 G/DL (ref 6–8.2)
PSA SERPL-MCNC: 8.05 NG/ML (ref 0–4)
RBC # BLD AUTO: 5.74 M/UL (ref 4.7–6.1)
SODIUM SERPL-SCNC: 137 MMOL/L (ref 135–145)
T3FREE SERPL-MCNC: 2.72 PG/ML (ref 2–4.4)
T4 FREE SERPL-MCNC: 1.3 NG/DL (ref 0.93–1.7)
TESTOST SERPL-MCNC: 341 NG/DL (ref 175–781)
TRIGL SERPL-MCNC: 78 MG/DL (ref 0–149)
TSH SERPL DL<=0.005 MIU/L-ACNC: 0.45 UIU/ML (ref 0.38–5.33)
VIT B12 SERPL-MCNC: 916 PG/ML (ref 211–911)
WBC # BLD AUTO: 5.7 K/UL (ref 4.8–10.8)

## 2021-09-07 PROCEDURE — 80061 LIPID PANEL: CPT

## 2021-09-07 PROCEDURE — 84481 FREE ASSAY (FT-3): CPT

## 2021-09-07 PROCEDURE — 85025 COMPLETE CBC W/AUTO DIFF WBC: CPT

## 2021-09-07 PROCEDURE — 82746 ASSAY OF FOLIC ACID SERUM: CPT

## 2021-09-07 PROCEDURE — 71046 X-RAY EXAM CHEST 2 VIEWS: CPT

## 2021-09-07 PROCEDURE — 84439 ASSAY OF FREE THYROXINE: CPT

## 2021-09-07 PROCEDURE — 82607 VITAMIN B-12: CPT

## 2021-09-07 PROCEDURE — 84402 ASSAY OF FREE TESTOSTERONE: CPT

## 2021-09-07 PROCEDURE — 84403 ASSAY OF TOTAL TESTOSTERONE: CPT

## 2021-09-07 PROCEDURE — 86800 THYROGLOBULIN ANTIBODY: CPT

## 2021-09-07 PROCEDURE — 82306 VITAMIN D 25 HYDROXY: CPT

## 2021-09-07 PROCEDURE — 84153 ASSAY OF PSA TOTAL: CPT

## 2021-09-07 PROCEDURE — 36415 COLL VENOUS BLD VENIPUNCTURE: CPT

## 2021-09-07 PROCEDURE — 80053 COMPREHEN METABOLIC PANEL: CPT

## 2021-09-07 PROCEDURE — 84443 ASSAY THYROID STIM HORMONE: CPT

## 2021-09-09 LAB
TESTOST FREE SERPL-MCNC: 59 PG/ML (ref 47–244)
THYROGLOB AB SERPL-ACNC: <0.9 IU/ML (ref 0–4)

## 2021-09-15 ENCOUNTER — SLEEP CENTER VISIT (OUTPATIENT)
Dept: SLEEP MEDICINE | Facility: MEDICAL CENTER | Age: 81
End: 2021-09-15
Payer: COMMERCIAL

## 2021-09-15 VITALS
HEART RATE: 68 BPM | WEIGHT: 236 LBS | RESPIRATION RATE: 16 BRPM | BODY MASS INDEX: 30.29 KG/M2 | SYSTOLIC BLOOD PRESSURE: 118 MMHG | HEIGHT: 74 IN | DIASTOLIC BLOOD PRESSURE: 66 MMHG | OXYGEN SATURATION: 92 %

## 2021-09-15 DIAGNOSIS — J44.9 CHRONIC OBSTRUCTIVE PULMONARY DISEASE, UNSPECIFIED COPD TYPE (HCC): ICD-10-CM

## 2021-09-15 DIAGNOSIS — G47.34 NOCTURNAL HYPOXIA: ICD-10-CM

## 2021-09-15 DIAGNOSIS — I50.22 CHRONIC SYSTOLIC HEART FAILURE (HCC): ICD-10-CM

## 2021-09-15 DIAGNOSIS — Z87.891 FORMER SMOKER: ICD-10-CM

## 2021-09-15 PROCEDURE — 99214 OFFICE O/P EST MOD 30 MIN: CPT | Performed by: INTERNAL MEDICINE

## 2021-09-15 ASSESSMENT — ENCOUNTER SYMPTOMS
BLURRED VISION: 0
PND: 0
BACK PAIN: 0
SORE THROAT: 0
WEIGHT LOSS: 0
EYE PAIN: 0
FOCAL WEAKNESS: 0
EYE REDNESS: 0
HEARTBURN: 0
WEAKNESS: 0
ABDOMINAL PAIN: 0
SPEECH CHANGE: 0
ORTHOPNEA: 0
CHILLS: 0
HEADACHES: 0
SPUTUM PRODUCTION: 0
NECK PAIN: 0
PHOTOPHOBIA: 0
VOMITING: 0
DOUBLE VISION: 0
FEVER: 0
PALPITATIONS: 0
DIARRHEA: 0
DIAPHORESIS: 0
HEMOPTYSIS: 0
NAUSEA: 0
COUGH: 0
TREMORS: 0
EYE DISCHARGE: 0
MYALGIAS: 0
SHORTNESS OF BREATH: 1
SINUS PAIN: 0
FALLS: 0
DIZZINESS: 0
DEPRESSION: 0
CONSTIPATION: 0
STRIDOR: 0
WHEEZING: 0
CLAUDICATION: 0

## 2021-09-15 ASSESSMENT — FIBROSIS 4 INDEX: FIB4 SCORE: 1.25

## 2021-09-15 NOTE — PROGRESS NOTES
Chief Complaint   Patient presents with   • COPD     last seen 5/24/21    • Results     CXR 9/7/21, PFT 7/19/21, opo 7/19/21, echo 8/10/21          HPI: This patient is a 80 y.o. male whom is followed in our clinic for COPD last seen by me on 5/24/21 to establish care. Patient's past medical history significant for hypertension, peripheral vascular disease, COPD, nocturnal hypoxia on supplemental oxygen with sleep study from 2015 showing no evidence of MIKA.  He is a former smoker with roughly 50-pack-year history and quit in 2011.  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  [facility] and symptoms were controlled on Advair 250/50 and short acting bronchodilators.  Pulmonary function testing from 2015 showed FEV1 of 2.22 L or 60% predicted with FEV1/FVC ratio of 66.  There was significant bronchodilator response, normal total lung capacity 95% predicted with mild air trapping and elevated DLCO 136% predicted.  He was reporting chest burning and shortness of breath with activity at our last clinic visit.  We transitioned him to Anoro and referred him to cardiology.  We also obtained pulmonary function testing which was done on July 19 that showed an FEV1 of 1.93 L or 58% predicted with normal lung volumes and normal DLCO, not significantly different from 2015.  Chest x-ray showed clear lung fields.  He was cardiology and MPI showed reversible defect with reduced EF.  Left heart cath showed no significant coronary artery disease.  He is being managed medically for heart failure with reduced ejection fraction.  He presents today for routine follow-up.  Symptoms have improved significantly, specifically no ongoing chest burning with activity.  He is using oxygen at night and we confirmed need based on overnight oximetry done on room air.  I have recommended he use his albuterol prior to activity previously and he has not done so.  He  does still get winded without chest pain during activity such as mowing the lawn.    Past Medical History:   Diagnosis Date   • Arthritis    • Bowel habit changes     constipation    • Breath shortness     chronic, uses 2L o2 at night ([facility])    • Bronchitis    • Chest pain 2012    Coronary angiogram showed no evidence of CAD.   • Chronic obstructive pulmonary disease (HCC)    • Disorder of thyroid     hypothyroid    • EMPHYSEMA    • Heart burn    • Hemorrhagic disorder ([facility])     bleeds easily    • High cholesterol    • Hypertension    • Indigestion    • Left bundle branch block    • Pain     Right calf pain, started about 3 weeks ago. pt denies swelling.   • Pneumonia    • PVD (peripheral vascular disease) ([facility])    • Tuberculosis     received treatment        Social History     Socioeconomic History   • Marital status: Single     Spouse name: Not on file   • Number of children: Not on file   • Years of education: Not on file   • Highest education level: Not on file   Occupational History   • Not on file   Tobacco Use   • Smoking status: Former Smoker     Packs/day: 1.00     Years: 50.00     Pack years: 50.00     Types: Cigarettes     Quit date: 10/4/2011     Years since quittin.9   • Smokeless tobacco: Never Used   Vaping Use   • Vaping Use: Never used   Substance and Sexual Activity   • Alcohol use: No   • Drug use: Never   • Sexual activity: Not on file   Other Topics Concern   • Not on file   Social History Narrative   • Not on file     Social Determinants of Health     Financial Resource Strain:    • Difficulty of Paying Living Expenses:    Food Insecurity:    • Worried About Running Out of Food in the Last Year:    • Ran Out of Food in the Last Year:    Transportation Needs:    • Lack of Transportation (Medical):    • Lack of Transportation (Non-Medical):    Physical Activity:    • Days of Exercise per Week:    • Minutes of Exercise per Session:    Stress:    • Feeling of Stress  :    Social Connections:    • Frequency of Communication with Friends and Family:    • Frequency of Social Gatherings with Friends and Family:    • Attends [employer] Services:    • Active Member of Clubs or Organizations:    • Attends Club or Organization Meetings:    • Marital Status:    Intimate Partner Violence:    • Fear of Current or Ex-Partner:    • Emotionally Abused:    • Physically Abused:    • Sexually Abused:        Family History   Problem Relation Age of Onset   • Heart Disease Mother    • Heart Disease Father        Current Outpatient Medications on File Prior to Visit   Medication Sig Dispense Refill   • metoprolol SR (TOPROL XL) 25 MG TABLET SR 24 HR Take 1 Tablet by mouth every day. 90 Tablet 3   • losartan (COZAAR) 25 MG Tab Take 1 Tablet by mouth every day. 90 Tablet 3   • atorvastatin (LIPITOR) 40 MG Tab Take 1 Tablet by mouth every evening. 90 Tablet 3   • DENTA 5000 PLUS 1.1 % Cream Take 1 Application by mouth every evening. USE AS DIRECTED     • Polyvinyl Alcohol-Povidone (REFRESH OP) Administer 2 Drops into both eyes 2 times a day.     • testosterone cypionate (DEPO-TESTOSTERONE) 200 MG/ML Solution injection Inject  into the shoulder, thigh, or buttocks every 14 days. 0.6 ml     • clonazePAM (KLONOPIN) 0.5 MG Tab Take 1 mg by mouth at bedtime as needed.     • levothyroxine (SYNTHROID) 88 MCG Tab Take 88 mcg by mouth every morning on an empty stomach.     • umeclidinium-vilanterol (ANORO ELLIPTA) 62.5-25 MCG/INH AEROSOL POWDER, BREATH ACTIVATED inhaler Inhale 1 Puff every day.     • Cholecalciferol (VITAMIN D3) 2000 UNIT TABS Take 1 Tablet by mouth every morning.     • aspirin 81 MG EC tablet Take 81 mg by mouth at bedtime.     • Home Care Oxygen Inhale at bedtime. At night        No current facility-administered medications on file prior to visit.       Cat hair extract and Iodine      ROS:   Review of Systems   Constitutional: Negative for chills, diaphoresis, fever, malaise/fatigue and  "weight loss.   HENT: Negative for congestion, ear discharge, ear pain, hearing loss, nosebleeds, sinus pain, sore throat and tinnitus.    Eyes: Negative for blurred vision, double vision, photophobia, pain, discharge and redness.   Respiratory: Positive for shortness of breath. Negative for cough, hemoptysis, sputum production, wheezing and stridor.    Cardiovascular: Negative for chest pain, palpitations, orthopnea, claudication, leg swelling and PND.   Gastrointestinal: Negative for abdominal pain, constipation, diarrhea, heartburn, nausea and vomiting.   Genitourinary: Negative for dysuria and urgency.   Musculoskeletal: Negative for back pain, falls, joint pain, myalgias and neck pain.   Skin: Negative for itching and rash.   Neurological: Negative for dizziness, tremors, speech change, focal weakness, weakness and headaches.   Endo/Heme/Allergies: Negative for environmental allergies.   Psychiatric/Behavioral: Negative for depression.       /66 (BP Location: Right arm, Patient Position: Sitting, BP Cuff Size: Large adult)   Pulse 68   Resp 16   Ht 1.867 m (6' 1.5\")   Wt 107 kg (236 lb)   SpO2 92%   Physical Exam  Vitals reviewed.   Constitutional:       General: He is not in acute distress.     Appearance: Normal appearance. He is normal weight.   HENT:      Head: Normocephalic and atraumatic.      Right Ear: External ear normal.      Left Ear: External ear normal.      Nose: Nose normal. No congestion.      Mouth/Throat:      Mouth: Mucous membranes are moist.      Pharynx: Oropharynx is clear. No oropharyngeal exudate.   Eyes:      General: No scleral icterus.     Extraocular Movements: Extraocular movements intact.      Conjunctiva/sclera: Conjunctivae normal.      Pupils: Pupils are equal, round, and reactive to light.   Cardiovascular:      Rate and Rhythm: Normal rate and regular rhythm.      Heart sounds: Normal heart sounds. No murmur heard.   No gallop.    Pulmonary:      Effort: No " respiratory distress.      Breath sounds: Normal breath sounds. No wheezing or rales.   Abdominal:      General: There is no distension.      Palpations: Abdomen is soft.   Musculoskeletal:         General: Normal range of motion.      Cervical back: Normal range of motion and neck supple.      Right lower leg: No edema.      Left lower leg: No edema.   Skin:     General: Skin is warm and dry.      Findings: No rash.   Neurological:      Mental Status: He is alert and oriented to person, place, and time.      Cranial Nerves: No cranial nerve deficit.   Psychiatric:         Mood and Affect: Mood normal.         Behavior: Behavior normal.         PFTs as reviewed by me personally: as per hPI    Imaging as reviewed by me personally:  As per hPI    Assessment:  1. Chronic obstructive pulmonary disease, unspecified COPD type (HCC)     2. Chronic systolic heart failure (HCC)     3. Former smoker     4. Nocturnal hypoxia         Plan:  1.  This is a chronic process of mild to moderate based on PFTs, no history of exacerbation.  He has some dyspnea which is likely multifactorial, heart failure with reduced ejection fraction, obstructive lung disease and possibly deconditioning.  I encouraged him to use albuterol prior to activity.  He is tobacco free and reports being up-to-date on vaccines but will check with PCP on pneumococcal vaccine.  2.  Followed by cardiology.  Well compensated based on exam.  3.  Tobacco free.  Encouraged ongoing abstinence.  Patient declined referral to lung cancer screening program given he is only eligible until 82.  4.  Patient is compliant with and benefiting from supplemental oxygen at night.  No clinical signs of MIKA.  Return in about 6 months (around 3/15/2022) for COPD.

## 2021-11-09 ASSESSMENT — ENCOUNTER SYMPTOMS
NAUSEA: 0
WHEEZING: 0
PALPITATIONS: 0
NEAR-SYNCOPE: 0
COUGH: 0
WEAKNESS: 0
DIZZINESS: 0
VOMITING: 0
ABDOMINAL PAIN: 0
DIARRHEA: 0
PND: 0
NIGHT SWEATS: 0
IRREGULAR HEARTBEAT: 0
SYNCOPE: 0
SHORTNESS OF BREATH: 0
ORTHOPNEA: 0
FOCAL WEAKNESS: 0
FEVER: 0

## 2021-11-09 NOTE — PROGRESS NOTES
Cardiology Follow up Consultation Note    Date of note:    11/10/21  Primary Care Provider: Mohan Yang M.D.    Patient Name: Jason Pearson     YOB: 1940  MRN:              [id number]    Chief Complaint: Follow-up mild HFrEF    History of Present Illness: Mr. Jason Pearson is a 80 y.o. male whose current medical problems include mild HFrEF (LVEF 45%, nonischemic cardiomyopathy), hypertension, left bundle branch block (noted first in EKG 2012), chronic obstructive airway disease, and PAD s/p iliac stent  who is here for follow-up mild HFrEF.    The patient was last seen in my clinic on 08/18/21.  He was initially seen in my clinic on 6/4/2021 for shortness of breath.  The patient underwent a nuclear stress test, which showed a small nonreversible defect with depressed LVEF.  Echocardiogram showed LVEF 45%.  As such, the patient underwent a coronary angiogram on 8/17/2021, which showed minimal luminal coronary artery disease.  During the last visit, he was started on metoprolol, and olmesartan was changed to losartan for HFrEF.    The patient returns today for follow-up.  He reports feeling well today.  Reports that some days are better than others.  He reports improved shortness of breath since last visit.  Denies any chest pain.  The patient denies any orthopnea, PND, or leg swelling.  No palpitations.  No syncope presyncopal episodes.    Cardiovascular Risk Factors:  1. Smoking status: Former smoker  2. Type II Diabetes Mellitus: None/not recently checked  3. Hypertension: On medication  4. Dyslipidemia: On statin  Cholesterol,Tot   Date Value Ref Range Status   02/16/2021 160 100 - 199 mg/dL Final     LDL   Date Value Ref Range Status   02/16/2021 96 <100 mg/dL Final     HDL   Date Value Ref Range Status   02/16/2021 40 >=40 mg/dL Final     Triglycerides   Date Value Ref Range Status   02/16/2021 121 0 - 149 mg/dL Final     5. Family history of early Coronary Artery Disease in a first  degree relative (Male less than 55 years of age; Female less than 65 years of age): None  6.  Obesity and/or Metabolic Syndrome: BMI 31.43  7. Sedentary lifestyle: Not sedentary     Review of Systems   Constitutional: Negative for fever, malaise/fatigue and night sweats.   Cardiovascular: Negative for chest pain, dyspnea on exertion, irregular heartbeat, leg swelling, near-syncope, orthopnea, palpitations, paroxysmal nocturnal dyspnea and syncope.   Respiratory: Negative for cough, shortness of breath and wheezing.    Gastrointestinal: Negative for abdominal pain, diarrhea, nausea and vomiting.   Neurological: Negative for dizziness, focal weakness and weakness.     All other systems reviewed and are negative.       Current Outpatient Medications   Medication Sig Dispense Refill   • metoprolol SR (TOPROL XL) 25 MG TABLET SR 24 HR Take 1.5 Tablets by mouth every day. 135 Tablet 3   • losartan (COZAAR) 25 MG Tab Take 1 Tablet by mouth every day. 90 Tablet 3   • atorvastatin (LIPITOR) 40 MG Tab Take 1 Tablet by mouth every evening. 90 Tablet 3   • DENTA 5000 PLUS 1.1 % Cream Take 1 Application by mouth every evening. USE AS DIRECTED     • Polyvinyl Alcohol-Povidone (REFRESH OP) Administer 2 Drops into both eyes 2 times a day.     • testosterone cypionate (DEPO-TESTOSTERONE) 200 MG/ML Solution injection Inject  into the shoulder, thigh, or buttocks every 14 days. 0.6 ml     • clonazePAM (KLONOPIN) 0.5 MG Tab Take 1 mg by mouth at bedtime as needed.     • levothyroxine (SYNTHROID) 88 MCG Tab Take 88 mcg by mouth every morning on an empty stomach.     • umeclidinium-vilanterol (ANORO ELLIPTA) 62.5-25 MCG/INH AEROSOL POWDER, BREATH ACTIVATED inhaler Inhale 1 Puff every day.     • Home Care Oxygen Inhale at bedtime. At night      • Cholecalciferol (VITAMIN D3) 2000 UNIT TABS Take 1 Tablet by mouth every morning.     • aspirin 81 MG EC tablet Take 81 mg by mouth at bedtime.       No current facility-administered medications  "for this visit.         Allergies   Allergen Reactions   • Cat Hair Extract Itching   • Iodine      Vomiting          Physical Exam:  Ambulatory Vitals  /80 (BP Location: Right arm, Patient Position: Sitting, BP Cuff Size: Adult)   Pulse 87   Ht 1.854 m (6' 1\")   Wt 110 kg (242 lb)   SpO2 95%    Oxygen Therapy:  Pulse Oximetry: 95 %  BP Readings from Last 4 Encounters:   11/10/21 144/80   09/15/21 118/66   08/18/21 122/70   08/17/21 147/67       Weight/BMI: Body mass index is 31.93 kg/m².  Wt Readings from Last 4 Encounters:   11/10/21 110 kg (242 lb)   09/15/21 107 kg (236 lb)   08/18/21 109 kg (241 lb)   08/17/21 107 kg (236 lb 15.9 oz)         General: Well appearing and in no apparent distress  Eyes: nl conjunctiva, no icteric sclera  ENT: wearing a mask, normal external appearance of ears  Neck: no visible JVP,  no carotid bruits  Lungs: normal respiratory effort, CTAB  Heart: RRR, no murmurs, no rubs or gallops,  no edema bilateral lower extremities. No LV/RV heave on cardiac palpatation. + bilateral radial pulses.  + bilateral dp pulses.   Abdomen: soft, non tender, non distended, no masses, normal bowel sounds.  No HSM.  Extremities/MSK: no clubbing, no cyanosis  Neurological: No focal sensory deficits  Psychiatric: Appropriate affect, A/O x 3, intact judgement and insight  Skin: Warm extremities      Lab Data Review:  Lab Results   Component Value Date/Time    CHOLSTRLTOT 113 09/07/2021 11:22 AM    LDL 55 09/07/2021 11:22 AM    HDL 42 09/07/2021 11:22 AM    TRIGLYCERIDE 78 09/07/2021 11:22 AM       Lab Results   Component Value Date/Time    SODIUM 137 09/07/2021 11:22 AM    POTASSIUM 4.5 09/07/2021 11:22 AM    CHLORIDE 105 09/07/2021 11:22 AM    CO2 23 09/07/2021 11:22 AM    GLUCOSE 93 09/07/2021 11:22 AM    BUN 18 09/07/2021 11:22 AM    CREATININE 1.21 09/07/2021 11:22 AM     Lab Results   Component Value Date/Time    ALKPHOSPHAT 79 09/07/2021 11:22 AM    ASTSGOT 16 09/07/2021 11:22 AM    ALTSGPT " 18 09/07/2021 11:22 AM    TBILIRUBIN 0.7 09/07/2021 11:22 AM      Lab Results   Component Value Date/Time    WBC 5.7 09/07/2021 11:22 AM     No results found for: HBA1C      Cardiac Imaging and Procedures Review:    EKG dated 6/4/2021: My personal interpretation is Sinus rhythm, LBBB    Coronary angiogram 8/17/2021  HEMODYNAMICS:   1. Aortic pressure: 147/66 mmHg  2. Pre A-wave pressure: 15 mmHg  3. No significant aortic gradient on pullback     CORONARY ANGIOGRAPHY:  1. The left main coronary artery is a short, patent vessel that bifurcates into the left anterior descending and left circumflex coronary arteries.  2. The left anterior descending coronary artery a large, transapical vessel that supplies a moderate first diagonal branch, a moderate second diagonal branch, and a large third diagonal branch and has minimal luminal irregularities in the distribution.  3. The left circumflex coronary artery is a large, nondominant vessel that supplies a small first obtuse marginal branch, a moderate second obtuse marginal branch, and a large third obtuse marginal branch and has minimal luminal irregularities in the distribution.  4. The right coronary artery is a large, dominant vessel that supplies a large posterior descending artery and a large posterolateral system and has minimal luminal irregularities in the distribution.     IMPRESSION:  1. Minimal luminal coronary artery disease  2. Borderline elevated left heart filling pressures     RECOMMENDATIONS:  1. Recover in post procedure unit  2. TR band release per protocol  3. Continue optimal medical therapy for nonischemic cardiomyopathy  4. Aggressive cardiac risk factor management  5.   Echocardiogram 8/10/2021  CONCLUSIONS  Mildly reduced left ventricular systolic function. Left ventricular   ejection fraction is visually estimated to be 45%.   Grade I diastolic dysfunction.  Dyskinesis of the interventricular septum, consistent with left bundle   branch block.    Normal right ventricular size and systolic function.  No significant valvular abnormalities.   No prior study is available for comparison.     Nuclear stress test 6/22/2021  NUCLEAR IMAGING INTERPRETATION   Small nonreversible defect in the apical septal (LAD) region.     No reversible ischemia.    Global hypokinesis with LVEF 33% (however, LVEF is better assessed via    echocardiogram).    Assessment & Plan     1. HFrEF (heart failure with reduced ejection fraction) ([facility])  metoprolol SR (TOPROL XL) 25 MG TABLET SR 24 HR   2. Nonischemic cardiomyopathy ([facility])  metoprolol SR (TOPROL XL) 25 MG TABLET SR 24 HR   3. ACC/AHA stage B heart failure ([facility])  metoprolol SR (TOPROL XL) 25 MG TABLET SR 24 HR   4. Chronic systolic congestive heart failure, NYHA class 2 ([facility])  metoprolol SR (TOPROL XL) 25 MG TABLET SR 24 HR   5. Essential hypertension  metoprolol SR (TOPROL XL) 25 MG TABLET SR 24 HR   6. Dyslipidemia     7. PAD (peripheral artery disease) ([facility])           Shared Medical Decision Making:    Mild HFrEF  Nonischemic cardiomyopathy  NYHA class II stage B heart failure  Coronary angiogram 8/17/2021 showed minimal luminal coronary disease.  Euvolemic on exam without diuretics.  Etiology of shortness of breath is likely multifactorial, likely contributed by both COPD and mild HFrEF.  -Continue aspirin and statin  -Increase Toprol to 37.5 mg daily  -Continue losartan 25 mg daily  -Consider repeating echocardiogram in a year.    Hypertension  Blood pressure elevated this visit  -Increase Toprol as above  -Continue losartan as above    Dyslipidemia  -Continue atorvastatin 40 mg daily    History of PAD status post iliac stent  Right leg arterial ultrasound obtained after the last visit without significant arterial occlusive disease  -Continue aspirin and statin as above      All of the patient's excellent questions were answered to the best of my knowledge and to his satisfaction.  It was a pleasure seeing Mr. Jason TAVERA Michel  in my clinic today. Return in about 3 months (around 2/10/2022). Patient is aware to call the cardiology clinic with any questions or concerns.      Hayes Mccall MD  [facility], [facility]  [address] [address]  Phone: [phone]  Fax: [phone]

## 2021-11-10 ENCOUNTER — OFFICE VISIT (OUTPATIENT)
Dept: CARDIOLOGY | Facility: MEDICAL CENTER | Age: 81
End: 2021-11-10
Payer: COMMERCIAL

## 2021-11-10 VITALS
HEIGHT: 73 IN | WEIGHT: 242 LBS | BODY MASS INDEX: 32.07 KG/M2 | DIASTOLIC BLOOD PRESSURE: 80 MMHG | HEART RATE: 87 BPM | SYSTOLIC BLOOD PRESSURE: 144 MMHG | OXYGEN SATURATION: 95 %

## 2021-11-10 DIAGNOSIS — I50.22 CHRONIC SYSTOLIC CONGESTIVE HEART FAILURE, NYHA CLASS 2 (HCC): ICD-10-CM

## 2021-11-10 DIAGNOSIS — I42.8 NONISCHEMIC CARDIOMYOPATHY (HCC): ICD-10-CM

## 2021-11-10 DIAGNOSIS — I10 ESSENTIAL HYPERTENSION: ICD-10-CM

## 2021-11-10 DIAGNOSIS — E78.5 DYSLIPIDEMIA: ICD-10-CM

## 2021-11-10 DIAGNOSIS — I50.20 HFREF (HEART FAILURE WITH REDUCED EJECTION FRACTION) (HCC): ICD-10-CM

## 2021-11-10 DIAGNOSIS — I50.9 ACC/AHA STAGE B HEART FAILURE (HCC): ICD-10-CM

## 2021-11-10 DIAGNOSIS — I73.9 PAD (PERIPHERAL ARTERY DISEASE) (HCC): ICD-10-CM

## 2021-11-10 PROCEDURE — 99214 OFFICE O/P EST MOD 30 MIN: CPT | Performed by: STUDENT IN AN ORGANIZED HEALTH CARE EDUCATION/TRAINING PROGRAM

## 2021-11-10 RX ORDER — METOPROLOL SUCCINATE 25 MG/1
37.5 TABLET, EXTENDED RELEASE ORAL DAILY
Qty: 135 TABLET | Refills: 3 | Status: SHIPPED | OUTPATIENT
Start: 2021-11-10 | End: 2022-02-03

## 2021-11-10 ASSESSMENT — FIBROSIS 4 INDEX: FIB4 SCORE: 1.25

## 2021-11-10 ASSESSMENT — ENCOUNTER SYMPTOMS: DYSPNEA ON EXERTION: 0

## 2021-11-10 NOTE — PATIENT INSTRUCTIONS
Measure blood pressure at home 5 times a week in the morning. Normal blood pressure is less than 130/80. For you, lower may be better.     Increase your metoprolol to 37.5mg daily.

## 2021-11-19 ENCOUNTER — PATIENT MESSAGE (OUTPATIENT)
Dept: CARDIOLOGY | Facility: MEDICAL CENTER | Age: 81
End: 2021-11-19

## 2021-11-22 NOTE — PATIENT COMMUNICATION
FW: Prescription  Received: Yesterday  CAROLE Ray R.N.  If he is not having any symptoms, okay to donate blood. Thank you!      Previous Messages       ----- Message -----   From: Darlyn Laird R.N.   Sent: 11/19/2021   2:18 PM PST   To: Hayes Mccall M.D.   Subject: FW: Prescription                                 Hi ! Please advise on patient donating blood/platelets. Thanks!   ----- Message -----   __________________________________________________________________________________________    MyChart message sent to patient with update.

## 2021-11-29 ENCOUNTER — HOSPITAL ENCOUNTER (OUTPATIENT)
Dept: RADIOLOGY | Facility: MEDICAL CENTER | Age: 81
End: 2021-11-29
Attending: PHYSICAL MEDICINE & REHABILITATION
Payer: COMMERCIAL

## 2021-11-29 DIAGNOSIS — M54.50 LOW BACK PAIN, UNSPECIFIED BACK PAIN LATERALITY, UNSPECIFIED CHRONICITY, UNSPECIFIED WHETHER SCIATICA PRESENT: ICD-10-CM

## 2021-11-29 PROCEDURE — 72148 MRI LUMBAR SPINE W/O DYE: CPT

## 2022-02-03 DIAGNOSIS — I50.22 CHRONIC SYSTOLIC CONGESTIVE HEART FAILURE, NYHA CLASS 2 (HCC): ICD-10-CM

## 2022-02-03 DIAGNOSIS — I10 ESSENTIAL HYPERTENSION: ICD-10-CM

## 2022-02-03 DIAGNOSIS — I50.9 ACC/AHA STAGE B HEART FAILURE (HCC): ICD-10-CM

## 2022-02-03 DIAGNOSIS — I50.20 HFREF (HEART FAILURE WITH REDUCED EJECTION FRACTION) (HCC): ICD-10-CM

## 2022-02-03 DIAGNOSIS — I42.8 NONISCHEMIC CARDIOMYOPATHY (HCC): ICD-10-CM

## 2022-02-03 RX ORDER — METOPROLOL SUCCINATE 25 MG/1
TABLET, EXTENDED RELEASE ORAL
Qty: 135 TABLET | Refills: 3 | Status: SHIPPED | OUTPATIENT
Start: 2022-02-03 | End: 2022-04-11 | Stop reason: SDUPTHER

## 2022-02-03 ASSESSMENT — ENCOUNTER SYMPTOMS
SYNCOPE: 0
WHEEZING: 0
FOCAL WEAKNESS: 0
DIARRHEA: 0
DIZZINESS: 0
NEAR-SYNCOPE: 0
WEAKNESS: 0
COUGH: 0
SHORTNESS OF BREATH: 0
IRREGULAR HEARTBEAT: 0
VOMITING: 0
NAUSEA: 0
DYSPNEA ON EXERTION: 0
ORTHOPNEA: 0
PALPITATIONS: 0
ABDOMINAL PAIN: 0
NIGHT SWEATS: 0
PND: 0
FEVER: 0

## 2022-02-03 NOTE — PROGRESS NOTES
Cardiology Follow up Consultation Note    Date of note:    02/04/22  Primary Care Provider: Mohan Yang M.D.    Patient Name: Jason Pearson     YOB: 1940  MRN:              [id number]    Chief Complaint: Follow-up mild HFrEF    History of Present Illness: Mr. Jason Pearson is a 80 y.o. male whose current medical problems include mild HFrEF (LVEF 45%, nonischemic cardiomyopathy), hypertension, left bundle branch block (noted first in EKG 2012), chronic obstructive airway disease, and PAD s/p iliac stent  who is here for follow-up mild HFrEF.    The patient was last seen in my clinic on 11/10/21.  The patient was doing well during the last visit.  Blood pressure was elevated last visit, and Toprol was increased to 37.5 mg daily.  No other changes were made to his medications.  Of note, the patient was initially seen in my clinic on 6/4/2021 for shortness of breath, underwent a nuclear stress test, which showed a small nonreversible defect with depressed LVEF.  Echocardiogram showed LVEF 45%.  As such, the patient underwent a coronary angiogram on 8/17/2021, which showed minimal luminal coronary artery disease.      The patient returns today for follow-up. He reports feeling okay today.  The patient noticed that he started having mild leg swelling as well as increased shortness of breath.  He denies any chest pain.  No orthopnea or PND.  No palpitations.  No syncope presyncopal episodes.    Cardiovascular Risk Factors:  1. Smoking status: Former smoker  2. Type II Diabetes Mellitus: None/not recently checked  3. Hypertension: On medication  4. Dyslipidemia: On statin  Cholesterol,Tot   Date Value Ref Range Status   02/16/2021 160 100 - 199 mg/dL Final     LDL   Date Value Ref Range Status   02/16/2021 96 <100 mg/dL Final     HDL   Date Value Ref Range Status   02/16/2021 40 >=40 mg/dL Final     Triglycerides   Date Value Ref Range Status   02/16/2021 121 0 - 149 mg/dL Final     5. Family  history of early Coronary Artery Disease in a first degree relative (Male less than 55 years of age; Female less than 65 years of age): None  6.  Obesity and/or Metabolic Syndrome: BMI 32.02  7. Sedentary lifestyle: Not sedentary     Review of Systems   Constitutional: Negative for fever, malaise/fatigue and night sweats.   Cardiovascular: Negative for chest pain, dyspnea on exertion, irregular heartbeat, leg swelling, near-syncope, orthopnea, palpitations, paroxysmal nocturnal dyspnea and syncope.   Respiratory: Negative for cough, shortness of breath and wheezing.    Gastrointestinal: Negative for abdominal pain, diarrhea, nausea and vomiting.   Neurological: Negative for dizziness, focal weakness and weakness.     All other systems reviewed and are negative.       Current Outpatient Medications   Medication Sig Dispense Refill   • furosemide (LASIX) 20 MG Tab Take 1 Tablet by mouth every day. 30 Tablet 3   • losartan (COZAAR) 25 MG Tab Take 1.5 Tablets by mouth every day. 135 Tablet 3   • metoprolol SR (TOPROL XL) 25 MG TABLET SR 24 HR TAKE 1 AND 1/2 TABLETS BY MOUTH EVERY  Tablet 3   • atorvastatin (LIPITOR) 40 MG Tab Take 1 Tablet by mouth every evening. 90 Tablet 3   • DENTA 5000 PLUS 1.1 % Cream Take 1 Application by mouth every evening. USE AS DIRECTED     • Polyvinyl Alcohol-Povidone (REFRESH OP) Administer 2 Drops into both eyes 2 times a day.     • testosterone cypionate (DEPO-TESTOSTERONE) 200 MG/ML Solution injection Inject  into the shoulder, thigh, or buttocks every 14 days. 0.6 ml     • clonazePAM (KLONOPIN) 0.5 MG Tab Take 1 mg by mouth at bedtime as needed.     • levothyroxine (SYNTHROID) 88 MCG Tab Take 88 mcg by mouth every morning on an empty stomach.     • umeclidinium-vilanterol (ANORO ELLIPTA) 62.5-25 MCG/INH AEROSOL POWDER, BREATH ACTIVATED inhaler Inhale 1 Puff every day.     • Home Care Oxygen Inhale at bedtime. At night      • Cholecalciferol (VITAMIN D3) 2000 UNIT TABS Take 1  "Tablet by mouth every morning.     • aspirin 81 MG EC tablet Take 81 mg by mouth at bedtime.       No current facility-administered medications for this visit.         Allergies   Allergen Reactions   • Cat Hair Extract Itching   • Iodine      Vomiting          Physical Exam:  Ambulatory Vitals  /70 (BP Location: Right arm, Patient Position: Sitting, BP Cuff Size: Adult)   Pulse 69   Resp 14   Ht 1.867 m (6' 1.5\")   Wt 112 kg (246 lb)   SpO2 94%    Oxygen Therapy:  Pulse Oximetry: 94 %  BP Readings from Last 4 Encounters:   02/04/22 150/70   11/10/21 144/80   09/15/21 118/66   08/18/21 122/70       Weight/BMI: Body mass index is 32.02 kg/m².  Wt Readings from Last 4 Encounters:   02/04/22 112 kg (246 lb)   11/10/21 110 kg (242 lb)   09/15/21 107 kg (236 lb)   08/18/21 109 kg (241 lb)         General: Well appearing and in no apparent distress  Eyes: nl conjunctiva, no icteric sclera  ENT: wearing a mask, normal external appearance of ears  Neck: no visible JVP,  no carotid bruits  Lungs: normal respiratory effort, CTAB  Heart: RRR, no murmurs, no rubs or gallops, trace edema bilateral lower extremities. No LV/RV heave on cardiac palpatation. + bilateral radial pulses.  + bilateral dp pulses.   Abdomen: soft, non tender, non distended, no masses, normal bowel sounds.  No HSM.  Extremities/MSK: no clubbing, no cyanosis  Neurological: No focal sensory deficits  Psychiatric: Appropriate affect, A/O x 3, intact judgement and insight  Skin: Warm extremities      Lab Data Review:  Lab Results   Component Value Date/Time    CHOLSTRLTOT 113 09/07/2021 11:22 AM    LDL 55 09/07/2021 11:22 AM    HDL 42 09/07/2021 11:22 AM    TRIGLYCERIDE 78 09/07/2021 11:22 AM       Lab Results   Component Value Date/Time    SODIUM 137 09/07/2021 11:22 AM    POTASSIUM 4.5 09/07/2021 11:22 AM    CHLORIDE 105 09/07/2021 11:22 AM    CO2 23 09/07/2021 11:22 AM    GLUCOSE 93 09/07/2021 11:22 AM    BUN 18 09/07/2021 11:22 AM    CREATININE " 1.21 09/07/2021 11:22 AM     Lab Results   Component Value Date/Time    ALKPHOSPHAT 79 09/07/2021 11:22 AM    ASTSGOT 16 09/07/2021 11:22 AM    ALTSGPT 18 09/07/2021 11:22 AM    TBILIRUBIN 0.7 09/07/2021 11:22 AM      Lab Results   Component Value Date/Time    WBC 5.7 09/07/2021 11:22 AM     No results found for: HBA1C      Cardiac Imaging and Procedures Review:    EKG dated 6/4/2021: My personal interpretation is Sinus rhythm, LBBB    Coronary angiogram 8/17/2021  HEMODYNAMICS:   1. Aortic pressure: 147/66 mmHg  2. Pre A-wave pressure: 15 mmHg  3. No significant aortic gradient on pullback     CORONARY ANGIOGRAPHY:  1. The left main coronary artery is a short, patent vessel that bifurcates into the left anterior descending and left circumflex coronary arteries.  2. The left anterior descending coronary artery a large, transapical vessel that supplies a moderate first diagonal branch, a moderate second diagonal branch, and a large third diagonal branch and has minimal luminal irregularities in the distribution.  3. The left circumflex coronary artery is a large, nondominant vessel that supplies a small first obtuse marginal branch, a moderate second obtuse marginal branch, and a large third obtuse marginal branch and has minimal luminal irregularities in the distribution.  4. The right coronary artery is a large, dominant vessel that supplies a large posterior descending artery and a large posterolateral system and has minimal luminal irregularities in the distribution.     IMPRESSION:  1. Minimal luminal coronary artery disease  2. Borderline elevated left heart filling pressures     RECOMMENDATIONS:  1. Recover in post procedure unit  2. TR band release per protocol  3. Continue optimal medical therapy for nonischemic cardiomyopathy  4. Aggressive cardiac risk factor management  5.   Echocardiogram 8/10/2021  CONCLUSIONS  Mildly reduced left ventricular systolic function. Left ventricular   ejection fraction is  visually estimated to be 45%.   Grade I diastolic dysfunction.  Dyskinesis of the interventricular septum, consistent with left bundle   branch block.   Normal right ventricular size and systolic function.  No significant valvular abnormalities.   No prior study is available for comparison.     Nuclear stress test 6/22/2021  NUCLEAR IMAGING INTERPRETATION   Small nonreversible defect in the apical septal (LAD) region.     No reversible ischemia.    Global hypokinesis with LVEF 33% (however, LVEF is better assessed via    echocardiogram).    Assessment & Plan     1. HFrEF (heart failure with reduced ejection fraction) ([facility])  Basic Metabolic Panel    furosemide (LASIX) 20 MG Tab    losartan (COZAAR) 25 MG Tab   2. Nonischemic cardiomyopathy ([facility])  Basic Metabolic Panel    furosemide (LASIX) 20 MG Tab    losartan (COZAAR) 25 MG Tab   3. Chronic systolic congestive heart failure, NYHA class 2 ([facility])  furosemide (LASIX) 20 MG Tab    losartan (COZAAR) 25 MG Tab   4. Essential hypertension  losartan (COZAAR) 25 MG Tab   5. Dyslipidemia     6. PAD (peripheral artery disease) ([facility])     7. CHF (NYHA class II, ACC/AHA stage C) ([facility])  Basic Metabolic Panel    furosemide (LASIX) 20 MG Tab    losartan (COZAAR) 25 MG Tab         Shared Medical Decision Making:    Mild HFrEF  Nonischemic cardiomyopathy  NYHA class II stage C heart failure  Coronary angiogram 8/17/2021 showed minimal luminal coronary disease.  Mildly volume up on exam.  Etiology of shortness of breath is likely multifactorial, likely contributed by both COPD and mild HFrEF.  -We will plan to get baseline labs today or tomorrow.  Patient to contact me after getting labs done.  Patient to start Lasix 20 mg daily after labs completed/reviewed by me.  Follow-up in 2 weeks with labs prior to assess kidney function/electrolytes and volume status.  -Continue Toprol 37.5 mg daily  -Increase losartan to 37.5 mg daily  -Consider ordering repeat echocardiogram next  visit    Hypertension  Blood pressure elevated this visit.  BP at home also elevated.  -Increase losartan as above  -Continue Toprol as above    Dyslipidemia  -Continue atorvastatin 40 mg daily    History of PAD status post iliac stent  Right leg arterial ultrasound obtained after the last visit without significant arterial occlusive disease  -Continue aspirin and statin as above    All of the patient's excellent questions were answered to the best of my knowledge and to his satisfaction.  It was a pleasure seeing Mr. Jason Pearson in my clinic today. Return in about 2 weeks (around 2/18/2022). Patient is aware to call the cardiology clinic with any questions or concerns.      Hayes Mccall MD  [facility]  [facility], [address]  [address] [address]  Phone: [phone]  Fax: [phone]

## 2022-02-04 ENCOUNTER — OFFICE VISIT (OUTPATIENT)
Dept: CARDIOLOGY | Facility: MEDICAL CENTER | Age: 82
End: 2022-02-04
Payer: COMMERCIAL

## 2022-02-04 VITALS
SYSTOLIC BLOOD PRESSURE: 150 MMHG | WEIGHT: 246 LBS | BODY MASS INDEX: 31.57 KG/M2 | RESPIRATION RATE: 14 BRPM | OXYGEN SATURATION: 94 % | HEART RATE: 69 BPM | HEIGHT: 74 IN | DIASTOLIC BLOOD PRESSURE: 70 MMHG

## 2022-02-04 DIAGNOSIS — I50.20 HFREF (HEART FAILURE WITH REDUCED EJECTION FRACTION) (HCC): ICD-10-CM

## 2022-02-04 DIAGNOSIS — I50.22 CHRONIC SYSTOLIC CONGESTIVE HEART FAILURE, NYHA CLASS 2 (HCC): ICD-10-CM

## 2022-02-04 DIAGNOSIS — I10 ESSENTIAL HYPERTENSION: ICD-10-CM

## 2022-02-04 DIAGNOSIS — I50.9 CHF (NYHA CLASS II, ACC/AHA STAGE C) (HCC): ICD-10-CM

## 2022-02-04 DIAGNOSIS — I73.9 PAD (PERIPHERAL ARTERY DISEASE) (HCC): ICD-10-CM

## 2022-02-04 DIAGNOSIS — I42.8 NONISCHEMIC CARDIOMYOPATHY (HCC): ICD-10-CM

## 2022-02-04 DIAGNOSIS — E78.5 DYSLIPIDEMIA: ICD-10-CM

## 2022-02-04 PROCEDURE — 99214 OFFICE O/P EST MOD 30 MIN: CPT | Performed by: STUDENT IN AN ORGANIZED HEALTH CARE EDUCATION/TRAINING PROGRAM

## 2022-02-04 RX ORDER — PREGABALIN 25 MG/1
CAPSULE ORAL
COMMUNITY
Start: 2021-12-08 | End: 2022-02-04

## 2022-02-04 RX ORDER — LOSARTAN POTASSIUM 25 MG/1
37.5 TABLET ORAL DAILY
Qty: 135 TABLET | Refills: 3 | Status: SHIPPED | OUTPATIENT
Start: 2022-02-04 | End: 2022-02-25

## 2022-02-04 RX ORDER — FUROSEMIDE 20 MG/1
20 TABLET ORAL DAILY
Qty: 30 TABLET | Refills: 3 | Status: SHIPPED | OUTPATIENT
Start: 2022-02-04 | End: 2022-05-23

## 2022-02-04 ASSESSMENT — FIBROSIS 4 INDEX: FIB4 SCORE: 1.26

## 2022-02-04 NOTE — PATIENT INSTRUCTIONS
"Increase losartan to 37.5mg daily.    Obtain labs 1-2 days from today, send me a message when labs are done, and wait for my instructions to start lasix    Make sure your lab has \"basic metabolic panel\" or \"comp metobaolic panel\"    Obtain labs prior to next visit.  "

## 2022-02-05 ENCOUNTER — HOSPITAL ENCOUNTER (OUTPATIENT)
Dept: LAB | Facility: MEDICAL CENTER | Age: 82
End: 2022-02-05
Attending: FAMILY MEDICINE
Payer: COMMERCIAL

## 2022-02-05 ENCOUNTER — HOSPITAL ENCOUNTER (OUTPATIENT)
Dept: LAB | Facility: MEDICAL CENTER | Age: 82
End: 2022-02-05
Attending: STUDENT IN AN ORGANIZED HEALTH CARE EDUCATION/TRAINING PROGRAM
Payer: COMMERCIAL

## 2022-02-05 DIAGNOSIS — I50.20 HFREF (HEART FAILURE WITH REDUCED EJECTION FRACTION) (HCC): ICD-10-CM

## 2022-02-05 DIAGNOSIS — I42.8 NONISCHEMIC CARDIOMYOPATHY (HCC): ICD-10-CM

## 2022-02-05 DIAGNOSIS — I50.9 CHF (NYHA CLASS II, ACC/AHA STAGE C) (HCC): ICD-10-CM

## 2022-02-05 LAB
ALBUMIN SERPL BCP-MCNC: 4 G/DL (ref 3.2–4.9)
ALBUMIN/GLOB SERPL: 1.3 G/DL
ALP SERPL-CCNC: 83 U/L (ref 30–99)
ALT SERPL-CCNC: 22 U/L (ref 2–50)
ANION GAP SERPL CALC-SCNC: 11 MMOL/L (ref 7–16)
ANION GAP SERPL CALC-SCNC: 9 MMOL/L (ref 7–16)
AST SERPL-CCNC: 18 U/L (ref 12–45)
BASOPHILS # BLD AUTO: 0.5 % (ref 0–1.8)
BASOPHILS # BLD: 0.04 K/UL (ref 0–0.12)
BILIRUB SERPL-MCNC: 0.8 MG/DL (ref 0.1–1.5)
BUN SERPL-MCNC: 19 MG/DL (ref 8–22)
BUN SERPL-MCNC: 19 MG/DL (ref 8–22)
CALCIUM SERPL-MCNC: 9.3 MG/DL (ref 8.5–10.5)
CALCIUM SERPL-MCNC: 9.4 MG/DL (ref 8.5–10.5)
CHLORIDE SERPL-SCNC: 105 MMOL/L (ref 96–112)
CHLORIDE SERPL-SCNC: 106 MMOL/L (ref 96–112)
CHOLEST SERPL-MCNC: 108 MG/DL (ref 100–199)
CO2 SERPL-SCNC: 24 MMOL/L (ref 20–33)
CO2 SERPL-SCNC: 25 MMOL/L (ref 20–33)
CREAT SERPL-MCNC: 1.33 MG/DL (ref 0.5–1.4)
CREAT SERPL-MCNC: 1.33 MG/DL (ref 0.5–1.4)
EOSINOPHIL # BLD AUTO: 0.21 K/UL (ref 0–0.51)
EOSINOPHIL NFR BLD: 2.5 % (ref 0–6.9)
ERYTHROCYTE [DISTWIDTH] IN BLOOD BY AUTOMATED COUNT: 47.7 FL (ref 35.9–50)
FASTING STATUS PATIENT QL REPORTED: NORMAL
FASTING STATUS PATIENT QL REPORTED: NORMAL
GLOBULIN SER CALC-MCNC: 3 G/DL (ref 1.9–3.5)
GLUCOSE SERPL-MCNC: 94 MG/DL (ref 65–99)
GLUCOSE SERPL-MCNC: 95 MG/DL (ref 65–99)
HCT VFR BLD AUTO: 56.8 % (ref 42–52)
HDLC SERPL-MCNC: 40 MG/DL
HGB BLD-MCNC: 18.7 G/DL (ref 14–18)
IMM GRANULOCYTES # BLD AUTO: 0.03 K/UL (ref 0–0.11)
IMM GRANULOCYTES NFR BLD AUTO: 0.4 % (ref 0–0.9)
LDLC SERPL CALC-MCNC: 51 MG/DL
LYMPHOCYTES # BLD AUTO: 2.87 K/UL (ref 1–4.8)
LYMPHOCYTES NFR BLD: 33.8 % (ref 22–41)
MCH RBC QN AUTO: 31.3 PG (ref 27–33)
MCHC RBC AUTO-ENTMCNC: 32.9 G/DL (ref 33.7–35.3)
MCV RBC AUTO: 95.1 FL (ref 81.4–97.8)
MONOCYTES # BLD AUTO: 0.84 K/UL (ref 0–0.85)
MONOCYTES NFR BLD AUTO: 9.9 % (ref 0–13.4)
NEUTROPHILS # BLD AUTO: 4.5 K/UL (ref 1.82–7.42)
NEUTROPHILS NFR BLD: 52.9 % (ref 44–72)
NRBC # BLD AUTO: 0 K/UL
NRBC BLD-RTO: 0 /100 WBC
PLATELET # BLD AUTO: 203 K/UL (ref 164–446)
PMV BLD AUTO: 10.7 FL (ref 9–12.9)
POTASSIUM SERPL-SCNC: 4.8 MMOL/L (ref 3.6–5.5)
POTASSIUM SERPL-SCNC: 4.8 MMOL/L (ref 3.6–5.5)
PROT SERPL-MCNC: 7 G/DL (ref 6–8.2)
RBC # BLD AUTO: 5.97 M/UL (ref 4.7–6.1)
SODIUM SERPL-SCNC: 140 MMOL/L (ref 135–145)
SODIUM SERPL-SCNC: 140 MMOL/L (ref 135–145)
T3FREE SERPL-MCNC: 3.39 PG/ML (ref 2–4.4)
T4 FREE SERPL-MCNC: 1.37 NG/DL (ref 0.93–1.7)
TRIGL SERPL-MCNC: 86 MG/DL (ref 0–149)
TSH SERPL DL<=0.005 MIU/L-ACNC: 0.78 UIU/ML (ref 0.38–5.33)
WBC # BLD AUTO: 8.5 K/UL (ref 4.8–10.8)

## 2022-02-05 PROCEDURE — 84403 ASSAY OF TOTAL TESTOSTERONE: CPT

## 2022-02-05 PROCEDURE — 80061 LIPID PANEL: CPT

## 2022-02-05 PROCEDURE — 84439 ASSAY OF FREE THYROXINE: CPT

## 2022-02-05 PROCEDURE — 80053 COMPREHEN METABOLIC PANEL: CPT

## 2022-02-05 PROCEDURE — 84270 ASSAY OF SEX HORMONE GLOBUL: CPT

## 2022-02-05 PROCEDURE — 85025 COMPLETE CBC W/AUTO DIFF WBC: CPT

## 2022-02-05 PROCEDURE — 80048 BASIC METABOLIC PNL TOTAL CA: CPT

## 2022-02-05 PROCEDURE — 84481 FREE ASSAY (FT-3): CPT

## 2022-02-05 PROCEDURE — 84402 ASSAY OF FREE TESTOSTERONE: CPT

## 2022-02-05 PROCEDURE — 84443 ASSAY THYROID STIM HORMONE: CPT

## 2022-02-05 PROCEDURE — 36415 COLL VENOUS BLD VENIPUNCTURE: CPT

## 2022-02-07 LAB
SHBG SERPL-SCNC: 42 NMOL/L (ref 11–80)
TESTOST FREE MFR SERPL: 1.9 % (ref 1.6–2.9)
TESTOST FREE SERPL-MCNC: 189 PG/ML (ref 47–244)
TESTOST SERPL-MCNC: 1007 NG/DL (ref 300–720)

## 2022-02-25 ENCOUNTER — OFFICE VISIT (OUTPATIENT)
Dept: CARDIOLOGY | Facility: MEDICAL CENTER | Age: 82
End: 2022-02-25
Payer: COMMERCIAL

## 2022-02-25 VITALS
RESPIRATION RATE: 16 BRPM | OXYGEN SATURATION: 93 % | SYSTOLIC BLOOD PRESSURE: 130 MMHG | DIASTOLIC BLOOD PRESSURE: 74 MMHG | BODY MASS INDEX: 32.07 KG/M2 | HEART RATE: 72 BPM | HEIGHT: 73 IN | WEIGHT: 242 LBS

## 2022-02-25 DIAGNOSIS — I50.20 HFREF (HEART FAILURE WITH REDUCED EJECTION FRACTION) (HCC): ICD-10-CM

## 2022-02-25 DIAGNOSIS — I10 ESSENTIAL HYPERTENSION: ICD-10-CM

## 2022-02-25 DIAGNOSIS — I42.8 NONISCHEMIC CARDIOMYOPATHY (HCC): ICD-10-CM

## 2022-02-25 DIAGNOSIS — I50.22 CHRONIC SYSTOLIC CONGESTIVE HEART FAILURE, NYHA CLASS 2 (HCC): ICD-10-CM

## 2022-02-25 DIAGNOSIS — E78.5 DYSLIPIDEMIA: ICD-10-CM

## 2022-02-25 DIAGNOSIS — I73.9 PAD (PERIPHERAL ARTERY DISEASE) (HCC): ICD-10-CM

## 2022-02-25 DIAGNOSIS — Z79.899 HIGH RISK MEDICATION USE: ICD-10-CM

## 2022-02-25 PROCEDURE — 99214 OFFICE O/P EST MOD 30 MIN: CPT | Performed by: NURSE PRACTITIONER

## 2022-02-25 RX ORDER — LOSARTAN POTASSIUM 50 MG/1
50 TABLET ORAL DAILY
Qty: 90 TABLET | Refills: 3 | Status: SHIPPED | OUTPATIENT
Start: 2022-02-25 | End: 2022-02-25

## 2022-02-25 RX ORDER — LOSARTAN POTASSIUM 50 MG/1
50 TABLET ORAL DAILY
Qty: 90 TABLET | Refills: 3 | Status: SHIPPED | OUTPATIENT
Start: 2022-02-25 | End: 2022-08-09

## 2022-02-25 ASSESSMENT — FIBROSIS 4 INDEX: FIB4 SCORE: 1.49

## 2022-02-25 NOTE — PATIENT INSTRUCTIONS
Try taking Furosemide/Lasix 20 mg daily as needed     Increase Losartan to 50 mg daily ( can take 2 tabs of the 25 mg dose until current prescription completed)    Lab in 2 weeks

## 2022-02-25 NOTE — PROGRESS NOTES
Chief Complaint   Patient presents with   • Congestive Heart Failure     F/V DX: HFrEF (heart failure with reduced ejection fraction) ([facility])   • Dyslipidemia   • HTN (Controlled)       Subjective     Jason Pearson is a 81 y.o. male who presents today for follow-up on his heart failure, hypertension.    Patient of Dr. Mccall.  He was last seen in clinic on 2/4/2022.  During that visit, losartan was increased to 37.5 mg daily and he was started on furosemide 20 mg daily.  He was sent for follow-up lab testing.    Patient reports no problems with the increase in medication.    He reports improvement of his shortness of breath and lower extremity edema with the med changes.    He reports today his blood pressure looks better.    He does continue to have a burning sensation in his chest on occasion with exertion.  He otherwise denies chest pain, palpitations, orthopnea, PND, edema, dizziness/lightheadedness or significant shortness of breath.    He does not weigh himself at home.    Patient states he does not exert much, he is limited due to back and hip problems    Additonally, patient has the following medical problems:    -Minimal CAD    -Former smoker    -DLD    -PAD, s/p iliac stent    Past Medical History:   Diagnosis Date   • Arthritis    • Bowel habit changes     constipation    • Breath shortness     chronic, uses 2L o2 at night ([facility])    • Bronchitis 1960   • Chest pain 4/5/2012    Coronary angiogram showed no evidence of CAD.   • Chronic obstructive pulmonary disease (HCC)    • Disorder of thyroid     hypothyroid    • EMPHYSEMA    • Heart burn    • Hemorrhagic disorder ([facility])     bleeds easily    • High cholesterol    • Hypertension    • Indigestion    • Left bundle branch block    • Pain     Right calf pain, started about 3 weeks ago. pt denies swelling.   • Pneumonia 2004   • PVD (peripheral vascular disease) ([facility])    • Tuberculosis 1953    received treatment      Past Surgical History:   Procedure  · No further testing, treatment, and/or escalation in care Laterality Date   • STENT PLACEMENT  08/1994    bilateral iliac stent placement.   • FOOT SURGERY     • OTHER      finger surgery    • OTHER      hemorrhoiectomy x2 in 70s or 80s   • SINUSOTOMIES     • SPINAL FORAMINOTOMY       Family History   Problem Relation Age of Onset   • Heart Disease Mother    • Heart Disease Father      Social History     Socioeconomic History   • Marital status: Single     Spouse name: Not on file   • Number of children: Not on file   • Years of education: Not on file   • Highest education level: Not on file   Occupational History   • Not on file   Tobacco Use   • Smoking status: Former Smoker     Packs/day: 1.00     Years: 50.00     Pack years: 50.00     Types: Cigarettes     Quit date: 10/4/2011     Years since quitting: 10.4   • Smokeless tobacco: Never Used   Vaping Use   • Vaping Use: Never used   Substance and Sexual Activity   • Alcohol use: No   • Drug use: Never   • Sexual activity: Not on file   Other Topics Concern   • Not on file   Social History Narrative   • Not on file     Social Determinants of Health     Financial Resource Strain: Not on file   Food Insecurity: Not on file   Transportation Needs: Not on file   Physical Activity: Not on file   Stress: Not on file   Social Connections: Not on file   Intimate Partner Violence: Not on file   Housing Stability: Not on file     Allergies   Allergen Reactions   • Cat Hair Extract Itching   • Iodine      Vomiting      Outpatient Encounter Medications as of 2/25/2022   Medication Sig Dispense Refill   • losartan (COZAAR) 50 MG Tab Take 1 Tablet by mouth every day. 90 Tablet 3   • furosemide (LASIX) 20 MG Tab Take 1 Tablet by mouth every day. 30 Tablet 3   • metoprolol SR (TOPROL XL) 25 MG TABLET SR 24 HR TAKE 1 AND 1/2 TABLETS BY MOUTH EVERY  Tablet 3   • atorvastatin (LIPITOR) 40 MG Tab Take 1 Tablet by mouth every evening. 90 Tablet 3   • DENTA 5000 PLUS 1.1 % Cream Take 1 Application by mouth every evening. USE AS  "DIRECTED     • Polyvinyl Alcohol-Povidone (REFRESH OP) Administer 2 Drops into both eyes 2 times a day.     • testosterone cypionate (DEPO-TESTOSTERONE) 200 MG/ML Solution injection Inject  into the shoulder, thigh, or buttocks every 14 days. 0.6 ml     • clonazePAM (KLONOPIN) 0.5 MG Tab Take 1 mg by mouth at bedtime as needed.     • levothyroxine (SYNTHROID) 88 MCG Tab Take 88 mcg by mouth every morning on an empty stomach.     • umeclidinium-vilanterol (ANORO ELLIPTA) 62.5-25 MCG/INH AEROSOL POWDER, BREATH ACTIVATED inhaler Inhale 1 Puff every day.     • Home Care Oxygen Inhale at bedtime. At night     • Cholecalciferol (VITAMIN D3) 2000 UNIT TABS Take 1 Tablet by mouth every morning.     • aspirin 81 MG EC tablet Take 81 mg by mouth at bedtime.     • [DISCONTINUED] losartan (COZAAR) 50 MG Tab Take 1 Tablet by mouth every day. 90 Tablet 3   • [DISCONTINUED] losartan (COZAAR) 25 MG Tab Take 1.5 Tablets by mouth every day. 135 Tablet 3     No facility-administered encounter medications on file as of 2/25/2022.     ROS           Objective     /74 (BP Location: Left arm, Patient Position: Sitting, BP Cuff Size: Adult)   Pulse 72   Resp 16   Ht 1.854 m (6' 1\")   Wt 110 kg (242 lb)   SpO2 93%   BMI 31.93 kg/m²     Physical Exam       Lab Results   Component Value Date/Time    CHOLSTRLTOT 108 02/05/2022 10:17 AM    LDL 51 02/05/2022 10:17 AM    HDL 40 02/05/2022 10:17 AM    TRIGLYCERIDE 86 02/05/2022 10:17 AM       Lab Results   Component Value Date/Time    SODIUM 140 02/05/2022 10:18 AM    POTASSIUM 4.8 02/05/2022 10:18 AM    CHLORIDE 106 02/05/2022 10:18 AM    CO2 25 02/05/2022 10:18 AM    GLUCOSE 94 02/05/2022 10:18 AM    BUN 19 02/05/2022 10:18 AM    CREATININE 1.33 02/05/2022 10:18 AM     Lab Results   Component Value Date/Time    ALKPHOSPHAT 83 02/05/2022 10:17 AM    ASTSGOT 18 02/05/2022 10:17 AM    ALTSGPT 22 02/05/2022 10:17 AM    TBILIRUBIN 0.8 02/05/2022 10:17 AM         Myocardial Perfusion Report " 6/22/2021   NUCLEAR IMAGING INTERPRETATION   Small nonreversible defect in the apical septal (LAD) region.     No reversible ischemia.    Global hypokinesis with LVEF 33% (however, LVEF is better assessed via    echocardiogram).      Transthoracic Echo Report 8/10/2021  Mildly reduced left ventricular systolic function. Left ventricular   ejection fraction is visually estimated to be 45%.   Grade I diastolic dysfunction.  Dyskinesis of the interventricular septum, consistent with left bundle   branch block.   Normal right ventricular size and systolic function.  No significant valvular abnormalities.   No prior study is available for comparison.     Cardiac catheterization 8/17/2021  HEMODYNAMICS:   1. Aortic pressure: 147/66 mmHg  2. Pre A-wave pressure: 15 mmHg  3. No significant aortic gradient on pullback     CORONARY ANGIOGRAPHY:  1. The left main coronary artery is a short, patent vessel that bifurcates into the left anterior descending and left circumflex coronary arteries.  2. The left anterior descending coronary artery a large, transapical vessel that supplies a moderate first diagonal branch, a moderate second diagonal branch, and a large third diagonal branch and has minimal luminal irregularities in the distribution.  3. The left circumflex coronary artery is a large, nondominant vessel that supplies a small first obtuse marginal branch, a moderate second obtuse marginal branch, and a large third obtuse marginal branch and has minimal luminal irregularities in the distribution.  4. The right coronary artery is a large, dominant vessel that supplies a large posterior descending artery and a large posterolateral system and has minimal luminal irregularities in the distribution.     IMPRESSION:  1. Minimal luminal coronary artery disease  2. Borderline elevated left heart filling pressures     RECOMMENDATIONS:  1. Recover in post procedure unit  2. TR band release per protocol  3. Continue optimal medical  therapy for nonischemic cardiomyopathy  4. Aggressive cardiac risk factor management    Assessment & Plan     1. HFrEF (heart failure with reduced ejection fraction) ([facility])  Basic Metabolic Panel    EC-ECHOCARDIOGRAM COMPLETE W/O CONT    losartan (COZAAR) 50 MG Tab    DISCONTINUED: losartan (COZAAR) 50 MG Tab   2. Nonischemic cardiomyopathy ([facility])  Basic Metabolic Panel    EC-ECHOCARDIOGRAM COMPLETE W/O CONT    losartan (COZAAR) 50 MG Tab    DISCONTINUED: losartan (COZAAR) 50 MG Tab   3. Chronic systolic congestive heart failure, NYHA class 2 ([facility])  losartan (COZAAR) 50 MG Tab    DISCONTINUED: losartan (COZAAR) 50 MG Tab   4. Essential hypertension  Basic Metabolic Panel    EC-ECHOCARDIOGRAM COMPLETE W/O CONT    losartan (COZAAR) 50 MG Tab    DISCONTINUED: losartan (COZAAR) 50 MG Tab   5. High risk medication use  Basic Metabolic Panel    EC-ECHOCARDIOGRAM COMPLETE W/O CONT   6. Dyslipidemia     7. PAD (peripheral artery disease) ([facility])         Medical Decision Making: Today's Assessment/Status/Plan:          HFmrEF, Stage C, Class 1-2, LVEF 45%: Based on physical examination findings, patient is euvolemic. No JVD, lungs are clear to auscultation, no pitting edema in bilateral lower extremities, no ascites.  -Heart failure due to nonischemic cardiomyopathy  -ACE-I/ARB/ARNI: Increase losartan to 50 mg daily  -Evidence Based Beta-blocker: Continue metoprolol SR 37.5 mg daily  -Aldosterone Antagonist: Not indicated  -Diuretic: At this time, patient to try taking furosemide 20 mg daily as needed  -SGLT2 inhibitor: Consider in the future  -Labs: BMP in 2 weeks  -Repeat Echo to follow EF, ICD for primary Prevention not indicated at this time due to previous LVEF at 45%  -Reinforced s/sx of worsening heart failure with patient and weight monitoring. Pt verbalizes understanding. Pt to call office or RTC if present.    -PUMP line number [phone] (PUMP) reviewed with patient  -Heart Failure Education: Education  reinforced  -Advanced care planning: Advanced directive and POLST to be discussed at future visit     Hypertension: Stable  -Recommendations per above    Dyslipidemia:  -Last LDL 51 on 2/5/2022  -Continue aspirin 81 mg daily  -Continue atorvastatin 40 mg daily    PAD, s/p iliac stent:  -ERIBERTO from 8/20/2021 showed mild to moderate arterial disease on right side, no evidence on left side.  -Continue statin and aspirin per above    FU in clinic in 3 months with Dr. Mccall. Sooner if needed.    Patient verbalizes understanding and agrees with the plan of care.     PLEASE NOTE: This Note was created using voice recognition Software. I have made every reasonable attempt to correct obvious errors, but I expect that there are errors of grammar and possibly content that I did not discover before finalizing the note

## 2022-03-01 ENCOUNTER — HOSPITAL ENCOUNTER (OUTPATIENT)
Dept: RADIOLOGY | Facility: MEDICAL CENTER | Age: 82
End: 2022-03-01
Attending: PHYSICAL MEDICINE & REHABILITATION
Payer: COMMERCIAL

## 2022-03-01 DIAGNOSIS — M25.561 RIGHT KNEE PAIN, UNSPECIFIED CHRONICITY: ICD-10-CM

## 2022-03-01 PROCEDURE — 73721 MRI JNT OF LWR EXTRE W/O DYE: CPT | Mod: RT

## 2022-03-09 ENCOUNTER — TELEPHONE (OUTPATIENT)
Dept: CARDIOLOGY | Facility: MEDICAL CENTER | Age: 82
End: 2022-03-09
Payer: COMMERCIAL

## 2022-03-09 NOTE — LETTER
PROCEDURE/SURGERY CLEARANCE FORM      Encounter Date: 3/9/2022    Patient: Jason Pearson  YOB: 1940    CARDIOLOGIST: Brooklyn HICKS    REFERRING DOCTOR:  Rigoberto    The above patient is cleared to have the following procedure/surgery: Donate Blood                                           Additional comments: Patient is cleared from a cardiac standpoint with history of heart failure and known oxygen use.         Electronically signed        MD Signature   Brooklyn JEAN BAPTISTE

## 2022-03-09 NOTE — TELEPHONE ENCOUNTER
ARBEN OCAMPO/ALVERTO CALLED AND IS REQUESTING A NOTE FROM  STATING THAT IT IS OKAY FOR PATIENT TO HAVE HIS BLOOD DRAWN. PLEASE ADVISE.     PH: [phone]  FAX NUMBER: [phone]    THANK YOU,  SUMMER TATE

## 2022-03-09 NOTE — LETTER
PROCEDURE/SURGERY CLEARANCE FORM      Encounter Date: 3/9/2022    Patient: Jason Pearson  YOB: 1940    CARDIOLOGIST: Brooklyn HICKS    REFERRING DOCTOR:  N/A    The above patient is cleared to have the following procedure/surgery: donate blood                                           Additional comments: n/a                 MD Signature   Brooklyn JEAN BAPTISTE

## 2022-03-11 DIAGNOSIS — I50.22 CHRONIC SYSTOLIC CONGESTIVE HEART FAILURE, NYHA CLASS 2 (HCC): ICD-10-CM

## 2022-03-11 DIAGNOSIS — I10 ESSENTIAL HYPERTENSION: ICD-10-CM

## 2022-03-11 DIAGNOSIS — I50.9 ACC/AHA STAGE B HEART FAILURE (HCC): ICD-10-CM

## 2022-03-11 DIAGNOSIS — I42.8 NONISCHEMIC CARDIOMYOPATHY (HCC): ICD-10-CM

## 2022-03-11 DIAGNOSIS — I50.20 HFREF (HEART FAILURE WITH REDUCED EJECTION FRACTION) (HCC): ICD-10-CM

## 2022-03-11 RX ORDER — METOPROLOL SUCCINATE 25 MG/1
37.5 TABLET, EXTENDED RELEASE ORAL
Qty: 135 TABLET | Refills: 3 | OUTPATIENT
Start: 2022-03-11

## 2022-03-14 ENCOUNTER — HOSPITAL ENCOUNTER (OUTPATIENT)
Dept: LAB | Facility: MEDICAL CENTER | Age: 82
End: 2022-03-14
Attending: NURSE PRACTITIONER
Payer: COMMERCIAL

## 2022-03-14 DIAGNOSIS — I50.20 HFREF (HEART FAILURE WITH REDUCED EJECTION FRACTION) (HCC): ICD-10-CM

## 2022-03-14 DIAGNOSIS — I10 ESSENTIAL HYPERTENSION: ICD-10-CM

## 2022-03-14 DIAGNOSIS — Z79.899 HIGH RISK MEDICATION USE: ICD-10-CM

## 2022-03-14 DIAGNOSIS — I42.8 NONISCHEMIC CARDIOMYOPATHY (HCC): ICD-10-CM

## 2022-03-14 LAB
ANION GAP SERPL CALC-SCNC: 10 MMOL/L (ref 7–16)
BUN SERPL-MCNC: 19 MG/DL (ref 8–22)
CALCIUM SERPL-MCNC: 9.5 MG/DL (ref 8.5–10.5)
CHLORIDE SERPL-SCNC: 103 MMOL/L (ref 96–112)
CO2 SERPL-SCNC: 27 MMOL/L (ref 20–33)
CREAT SERPL-MCNC: 1.32 MG/DL (ref 0.5–1.4)
GLUCOSE SERPL-MCNC: 101 MG/DL (ref 65–99)
POTASSIUM SERPL-SCNC: 4.5 MMOL/L (ref 3.6–5.5)
SODIUM SERPL-SCNC: 140 MMOL/L (ref 135–145)

## 2022-03-14 PROCEDURE — 80048 BASIC METABOLIC PNL TOTAL CA: CPT

## 2022-03-14 PROCEDURE — 36415 COLL VENOUS BLD VENIPUNCTURE: CPT

## 2022-03-28 NOTE — TELEPHONE ENCOUNTER
S/W [employer], they never received the letter we faxed two weeks ago.     They need a letter that specifically states that he cleared from a cardiac standpoint given his known hx of heart failure and current oxygen use to donate blood.    [phone]- land line fax    To MAYELIN

## 2022-03-28 NOTE — TELEPHONE ENCOUNTER
Reynaldo wilcox [facility] is calling. She would like you to reach out to her at [phone] regarding a more detailed note.     Thank you,  Loretta TAVERA

## 2022-04-06 ENCOUNTER — HOSPITAL ENCOUNTER (OUTPATIENT)
Dept: LAB | Facility: MEDICAL CENTER | Age: 82
End: 2022-04-06
Attending: PHYSICIAN ASSISTANT
Payer: COMMERCIAL

## 2022-04-06 LAB — PSA SERPL-MCNC: 5.52 NG/ML (ref 0–4)

## 2022-04-06 PROCEDURE — 36415 COLL VENOUS BLD VENIPUNCTURE: CPT

## 2022-04-06 PROCEDURE — 84153 ASSAY OF PSA TOTAL: CPT

## 2022-04-11 RX ORDER — METOPROLOL SUCCINATE 25 MG/1
37.5 TABLET, EXTENDED RELEASE ORAL
Qty: 135 TABLET | Refills: 3 | Status: SHIPPED | OUTPATIENT
Start: 2022-04-11 | End: 2022-05-23

## 2022-05-16 ENCOUNTER — HOSPITAL ENCOUNTER (OUTPATIENT)
Dept: CARDIOLOGY | Facility: MEDICAL CENTER | Age: 82
End: 2022-05-16
Attending: NURSE PRACTITIONER
Payer: COMMERCIAL

## 2022-05-16 DIAGNOSIS — I50.20 HFREF (HEART FAILURE WITH REDUCED EJECTION FRACTION) (HCC): ICD-10-CM

## 2022-05-16 DIAGNOSIS — I10 ESSENTIAL HYPERTENSION: ICD-10-CM

## 2022-05-16 DIAGNOSIS — I42.8 NONISCHEMIC CARDIOMYOPATHY (HCC): ICD-10-CM

## 2022-05-16 DIAGNOSIS — Z79.899 HIGH RISK MEDICATION USE: ICD-10-CM

## 2022-05-16 LAB
LV EJECT FRACT  99904: 35
LV EJECT FRACT MOD 2C 99903: 34.38
LV EJECT FRACT MOD 4C 99902: 40.24
LV EJECT FRACT MOD BP 99901: 39.3

## 2022-05-16 PROCEDURE — 93306 TTE W/DOPPLER COMPLETE: CPT | Mod: 26 | Performed by: INTERNAL MEDICINE

## 2022-05-16 PROCEDURE — 93306 TTE W/DOPPLER COMPLETE: CPT

## 2022-05-17 NOTE — RESULT ENCOUNTER NOTE
Please let patient know that his echo is abnormal and Dr. Mccall will discuss results at their Follow up visit.

## 2022-05-22 ASSESSMENT — ENCOUNTER SYMPTOMS
DIZZINESS: 0
PND: 0
IRREGULAR HEARTBEAT: 0
COUGH: 0
SHORTNESS OF BREATH: 0
ABDOMINAL PAIN: 0
PALPITATIONS: 0
DYSPNEA ON EXERTION: 0
NIGHT SWEATS: 0
FEVER: 0
WHEEZING: 0
WEAKNESS: 0
DIARRHEA: 0
FOCAL WEAKNESS: 0
NAUSEA: 0
ORTHOPNEA: 0
SYNCOPE: 0
NEAR-SYNCOPE: 0
VOMITING: 0

## 2022-05-23 ENCOUNTER — OFFICE VISIT (OUTPATIENT)
Dept: CARDIOLOGY | Facility: MEDICAL CENTER | Age: 82
End: 2022-05-23
Payer: COMMERCIAL

## 2022-05-23 VITALS
HEIGHT: 73 IN | DIASTOLIC BLOOD PRESSURE: 82 MMHG | SYSTOLIC BLOOD PRESSURE: 142 MMHG | HEART RATE: 72 BPM | BODY MASS INDEX: 31.62 KG/M2 | WEIGHT: 238.6 LBS | OXYGEN SATURATION: 95 % | RESPIRATION RATE: 16 BRPM

## 2022-05-23 DIAGNOSIS — I73.9 PAD (PERIPHERAL ARTERY DISEASE) (HCC): ICD-10-CM

## 2022-05-23 DIAGNOSIS — I10 ESSENTIAL HYPERTENSION: ICD-10-CM

## 2022-05-23 DIAGNOSIS — I50.9 CHF (NYHA CLASS II, ACC/AHA STAGE C) (HCC): ICD-10-CM

## 2022-05-23 DIAGNOSIS — I50.20 HFREF (HEART FAILURE WITH REDUCED EJECTION FRACTION) (HCC): ICD-10-CM

## 2022-05-23 DIAGNOSIS — E78.5 DYSLIPIDEMIA: ICD-10-CM

## 2022-05-23 DIAGNOSIS — I42.8 NONISCHEMIC CARDIOMYOPATHY (HCC): ICD-10-CM

## 2022-05-23 PROCEDURE — 99214 OFFICE O/P EST MOD 30 MIN: CPT | Performed by: STUDENT IN AN ORGANIZED HEALTH CARE EDUCATION/TRAINING PROGRAM

## 2022-05-23 RX ORDER — METOPROLOL SUCCINATE 50 MG/1
50 TABLET, EXTENDED RELEASE ORAL DAILY
Qty: 90 TABLET | Refills: 3 | Status: SHIPPED | OUTPATIENT
Start: 2022-05-23 | End: 2022-08-09

## 2022-05-23 ASSESSMENT — FIBROSIS 4 INDEX: FIB4 SCORE: 1.49

## 2022-05-23 NOTE — PROGRESS NOTES
Cardiology Follow up Consultation Note    Date of note:   05/23/22  Primary Care Provider: Mohan Yang M.D.    Patient Name: Jason Pearson     YOB: 1940  MRN:              [id number]    Chief Complaint: Follow-up HFrEF    History of Present Illness: Mr. Jason Pearson is a 81 y.o. male whose current medical problems include HFrEF due to nonischemic cardiomyopathy, hypertension, left bundle branch block (noted first in EKG 2012), chronic obstructive airway disease, and PAD s/p iliac stent who is here for follow-up HFrEF.    The patient was last seen in my clinic on 02/04/22. Due to increased shortness of breath, he was started on lasix. Losartan was also increased.  The patient followed with KURT Kilpatrick, on 2/25/2022. The patient's BP was better and volume status improved. He was instructed to take lasix as needed.  The patient had a repeat echocardiogram on 5/16/2021, which showed LVEF 35%, which is measured lower than his prior echocardiogram.    The patient returns today for follow-up.   The patient reports feeling well today.  He no longer has chest tightness or shortness of breath.  He reports feeling much better than last visit.  He is able to mow the lawn without any problems.  However, he is not able to walk much due to leg issues.  The patient reports that he no longer needs furosemide.  He denies any orthopnea, PND, or leg swelling.  No palpitations.  No syncope or presyncopal episodes.      Cardiovascular Risk Factors:  1. Smoking status: Former smoker  2. Type II Diabetes Mellitus: None/not recently checked  3. Hypertension: On medication  4. Dyslipidemia: On statin  Cholesterol,Tot   Date Value Ref Range Status   02/16/2021 160 100 - 199 mg/dL Final     LDL   Date Value Ref Range Status   02/16/2021 96 <100 mg/dL Final     HDL   Date Value Ref Range Status   02/16/2021 40 >=40 mg/dL Final     Triglycerides   Date Value Ref Range Status   02/16/2021 121 0 - 149 mg/dL Final      5. Family history of early Coronary Artery Disease in a first degree relative (Male less than 55 years of age; Female less than 65 years of age): None  6.  Obesity and/or Metabolic Syndrome: BMI 32.02  7. Sedentary lifestyle: Not sedentary, mows lawn    Review of Systems   Constitutional: Negative for fever, malaise/fatigue and night sweats.   Cardiovascular: Negative for chest pain, dyspnea on exertion, irregular heartbeat, leg swelling, near-syncope, orthopnea, palpitations, paroxysmal nocturnal dyspnea and syncope.   Respiratory: Negative for cough, shortness of breath and wheezing.    Gastrointestinal: Negative for abdominal pain, diarrhea, nausea and vomiting.   Neurological: Negative for dizziness, focal weakness and weakness.     All other systems reviewed and are negative.       Current Outpatient Medications   Medication Sig Dispense Refill   • metoprolol SR (TOPROL XL) 50 MG TABLET SR 24 HR Take 1 Tablet by mouth every day. 90 Tablet 3   • losartan (COZAAR) 50 MG Tab Take 1 Tablet by mouth every day. 90 Tablet 3   • atorvastatin (LIPITOR) 40 MG Tab Take 1 Tablet by mouth every evening. 90 Tablet 3   • DENTA 5000 PLUS 1.1 % Cream Take 1 Application by mouth every evening. USE AS DIRECTED     • Polyvinyl Alcohol-Povidone (REFRESH OP) Administer 2 Drops into both eyes 2 times a day.     • testosterone cypionate (DEPO-TESTOSTERONE) 200 MG/ML Solution injection Inject  into the shoulder, thigh, or buttocks every 14 days. 0.6 ml     • clonazePAM (KLONOPIN) 0.5 MG Tab Take 1 mg by mouth at bedtime as needed.     • levothyroxine (SYNTHROID) 88 MCG Tab Take 88 mcg by mouth every morning on an empty stomach.     • umeclidinium-vilanterol (ANORO ELLIPTA) 62.5-25 MCG/INH AEROSOL POWDER, BREATH ACTIVATED inhaler Inhale 1 Puff every day.     • Home Care Oxygen Inhale at bedtime. At night     • Cholecalciferol (VITAMIN D3) 2000 UNIT TABS Take 1 Tablet by mouth every morning.     • aspirin 81 MG EC tablet Take 81 mg  "by mouth at bedtime.       No current facility-administered medications for this visit.         Allergies   Allergen Reactions   • Cat Hair Extract Itching   • Iodine      Vomiting          Physical Exam:  Ambulatory Vitals  BP (!) 142/82 (BP Location: Left arm, Patient Position: Sitting, BP Cuff Size: Adult)   Pulse 72   Resp 16   Ht 1.854 m (6' 1\")   Wt 108 kg (238 lb 9.6 oz)   SpO2 95%    Oxygen Therapy:  Pulse Oximetry: 95 %  BP Readings from Last 4 Encounters:   05/23/22 (!) 142/82   02/25/22 130/74   02/04/22 150/70   11/10/21 144/80       Weight/BMI: Body mass index is 31.48 kg/m².  Wt Readings from Last 4 Encounters:   05/23/22 108 kg (238 lb 9.6 oz)   02/25/22 110 kg (242 lb)   02/04/22 112 kg (246 lb)   11/10/21 110 kg (242 lb)         General: Well appearing and in no apparent distress  Eyes: nl conjunctiva, no icteric sclera  ENT: wearing a mask, normal external appearance of ears  Neck: no visible JVP,  no carotid bruits  Lungs: normal respiratory effort, CTAB  Heart: RRR, no murmurs, no rubs or gallops, trace edema bilateral lower extremities. No LV/RV heave on cardiac palpatation. + bilateral radial pulses.  + bilateral dp pulses.   Abdomen: soft, non tender, non distended, no masses, normal bowel sounds.  No HSM.  Extremities/MSK: no clubbing, no cyanosis  Neurological: No focal sensory deficits  Psychiatric: Appropriate affect, A/O x 3, intact judgement and insight  Skin: Warm extremities      Lab Data Review:  Lab Results   Component Value Date/Time    CHOLSTRLTOT 108 02/05/2022 10:17 AM    LDL 51 02/05/2022 10:17 AM    HDL 40 02/05/2022 10:17 AM    TRIGLYCERIDE 86 02/05/2022 10:17 AM       Lab Results   Component Value Date/Time    SODIUM 140 03/14/2022 12:44 PM    POTASSIUM 4.5 03/14/2022 12:44 PM    CHLORIDE 103 03/14/2022 12:44 PM    CO2 27 03/14/2022 12:44 PM    GLUCOSE 101 (H) 03/14/2022 12:44 PM    BUN 19 03/14/2022 12:44 PM    CREATININE 1.32 03/14/2022 12:44 PM     Lab Results "   Component Value Date/Time    ALKPHOSPHAT 83 02/05/2022 10:17 AM    ASTSGOT 18 02/05/2022 10:17 AM    ALTSGPT 22 02/05/2022 10:17 AM    TBILIRUBIN 0.8 02/05/2022 10:17 AM      Lab Results   Component Value Date/Time    WBC 8.5 02/05/2022 10:17 AM     No results found for: HBA1C      Cardiac Imaging and Procedures Review:    EKG dated 6/4/2021: My personal interpretation is Sinus rhythm, LBBB    Coronary angiogram 8/17/2021  HEMODYNAMICS:   1. Aortic pressure: 147/66 mmHg  2. Pre A-wave pressure: 15 mmHg  3. No significant aortic gradient on pullback     CORONARY ANGIOGRAPHY:  1. The left main coronary artery is a short, patent vessel that bifurcates into the left anterior descending and left circumflex coronary arteries.  2. The left anterior descending coronary artery a large, transapical vessel that supplies a moderate first diagonal branch, a moderate second diagonal branch, and a large third diagonal branch and has minimal luminal irregularities in the distribution.  3. The left circumflex coronary artery is a large, nondominant vessel that supplies a small first obtuse marginal branch, a moderate second obtuse marginal branch, and a large third obtuse marginal branch and has minimal luminal irregularities in the distribution.  4. The right coronary artery is a large, dominant vessel that supplies a large posterior descending artery and a large posterolateral system and has minimal luminal irregularities in the distribution.     IMPRESSION:  1. Minimal luminal coronary artery disease  2. Borderline elevated left heart filling pressures     RECOMMENDATIONS:  1. Recover in post procedure unit  2. TR band release per protocol  3. Continue optimal medical therapy for nonischemic cardiomyopathy  4. Aggressive cardiac risk factor management    Echocardiogram 5/6/2022  CONCLUSIONS  The left ventricle is mildly dilated.  The left ventricular ejection fraction is visually estimated to be 35%.  There is abnormal septal  motion consistent with underlying conduction   delay.  Grade I diastolic dysfunction.  The right ventricle is normal in size and systolic function.  Unable to estimate pulmonary artery pressure due to an inadequate   tricuspid regurgitant jet.  The left atrium is normal in size.  No significant valvular abnormalities.  Probably normal inferior vena cava size and inspiratory collapse.    Echocardiogram 8/10/2021  CONCLUSIONS  Mildly reduced left ventricular systolic function. Left ventricular   ejection fraction is visually estimated to be 45%.   Grade I diastolic dysfunction.  Dyskinesis of the interventricular septum, consistent with left bundle   branch block.   Normal right ventricular size and systolic function.  No significant valvular abnormalities.   No prior study is available for comparison.     Nuclear stress test 6/22/2021  NUCLEAR IMAGING INTERPRETATION   Small nonreversible defect in the apical septal (LAD) region.     No reversible ischemia.    Global hypokinesis with LVEF 33% (however, LVEF is better assessed via    echocardiogram).    Assessment & Plan     1. HFrEF (heart failure with reduced ejection fraction) ([facility])  metoprolol SR (TOPROL XL) 50 MG TABLET SR 24 HR   2. Nonischemic cardiomyopathy ([facility])     3. Essential hypertension     4. CHF (NYHA class II, ACC/AHA stage C) ([facility])     5. Dyslipidemia     6. PAD (peripheral artery disease) ([facility])           Shared Medical Decision Making:    HFrEF  Nonischemic cardiomyopathy  NYHA class II stage C heart failure  Coronary angiogram 8/17/2021 showed minimal luminal coronary disease.  Euvolemic on exam.    -Patient no longer requiring furosemide  -Increase Toprol to 50 mg daily  -Continue losartan 50 mg daily next visit  -Consider adding aldactone 25 mg daily  -LVEF is at 35 to 40%, no indication for ICD at this time    Hypertension  Blood pressure elevated this visit.  BP at home also elevated.  -Continue losartan as above  -Increase Toprol as  above    Dyslipidemia  -Continue atorvastatin 40 mg daily    History of PAD status post iliac stent  Right leg arterial ultrasound obtained after the last visit without significant arterial occlusive disease  -Continue aspirin and statin as above    All of the patient's excellent questions were answered to the best of my knowledge and to his satisfaction.  It was a pleasure seeing . Jason Pearson in my clinic today. Return in about 2 months (around 7/23/2022). Patient is aware to call the cardiology clinic with any questions or concerns.      Hayes Mccall MD  [facility] Heart [facility] Vascular [facility], [address]  [address] [address]  Phone: [phone]  Fax: [phone]

## 2022-06-15 ENCOUNTER — HOSPITAL ENCOUNTER (OUTPATIENT)
Dept: LAB | Facility: MEDICAL CENTER | Age: 82
End: 2022-06-15
Attending: FAMILY MEDICINE
Payer: COMMERCIAL

## 2022-06-15 LAB
25(OH)D3 SERPL-MCNC: 48 NG/ML (ref 30–100)
ALBUMIN SERPL BCP-MCNC: 4.1 G/DL (ref 3.2–4.9)
ALBUMIN/GLOB SERPL: 1.3 G/DL
ALP SERPL-CCNC: 91 U/L (ref 30–99)
ALT SERPL-CCNC: 31 U/L (ref 2–50)
ANION GAP SERPL CALC-SCNC: 11 MMOL/L (ref 7–16)
AST SERPL-CCNC: 20 U/L (ref 12–45)
BASOPHILS # BLD AUTO: 1.1 % (ref 0–1.8)
BASOPHILS # BLD: 0.09 K/UL (ref 0–0.12)
BILIRUB SERPL-MCNC: 0.7 MG/DL (ref 0.1–1.5)
BUN SERPL-MCNC: 14 MG/DL (ref 8–22)
CALCIUM SERPL-MCNC: 9.1 MG/DL (ref 8.5–10.5)
CHLORIDE SERPL-SCNC: 106 MMOL/L (ref 96–112)
CHOLEST SERPL-MCNC: 106 MG/DL (ref 100–199)
CO2 SERPL-SCNC: 25 MMOL/L (ref 20–33)
CREAT SERPL-MCNC: 1.36 MG/DL (ref 0.5–1.4)
EOSINOPHIL # BLD AUTO: 0.24 K/UL (ref 0–0.51)
EOSINOPHIL NFR BLD: 3 % (ref 0–6.9)
ERYTHROCYTE [DISTWIDTH] IN BLOOD BY AUTOMATED COUNT: 47.6 FL (ref 35.9–50)
FASTING STATUS PATIENT QL REPORTED: NORMAL
GFR SERPLBLD CREATININE-BSD FMLA CKD-EPI: 53 ML/MIN/1.73 M 2
GLOBULIN SER CALC-MCNC: 3.1 G/DL (ref 1.9–3.5)
GLUCOSE SERPL-MCNC: 93 MG/DL (ref 65–99)
HCT VFR BLD AUTO: 59.6 % (ref 42–52)
HDLC SERPL-MCNC: 40 MG/DL
HGB BLD-MCNC: 19.5 G/DL (ref 14–18)
IMM GRANULOCYTES # BLD AUTO: 0.01 K/UL (ref 0–0.11)
IMM GRANULOCYTES NFR BLD AUTO: 0.1 % (ref 0–0.9)
LDLC SERPL CALC-MCNC: 51 MG/DL
LYMPHOCYTES # BLD AUTO: 2.93 K/UL (ref 1–4.8)
LYMPHOCYTES NFR BLD: 36.4 % (ref 22–41)
MCH RBC QN AUTO: 30.9 PG (ref 27–33)
MCHC RBC AUTO-ENTMCNC: 32.7 G/DL (ref 33.7–35.3)
MCV RBC AUTO: 94.5 FL (ref 81.4–97.8)
MONOCYTES # BLD AUTO: 0.97 K/UL (ref 0–0.85)
MONOCYTES NFR BLD AUTO: 12 % (ref 0–13.4)
NEUTROPHILS # BLD AUTO: 3.82 K/UL (ref 1.82–7.42)
NEUTROPHILS NFR BLD: 47.4 % (ref 44–72)
NRBC # BLD AUTO: 0 K/UL
NRBC BLD-RTO: 0 /100 WBC
PLATELET # BLD AUTO: 207 K/UL (ref 164–446)
PMV BLD AUTO: 10.2 FL (ref 9–12.9)
POTASSIUM SERPL-SCNC: 4.5 MMOL/L (ref 3.6–5.5)
PROT SERPL-MCNC: 7.2 G/DL (ref 6–8.2)
RBC # BLD AUTO: 6.31 M/UL (ref 4.7–6.1)
SODIUM SERPL-SCNC: 142 MMOL/L (ref 135–145)
T3FREE SERPL-MCNC: 3.22 PG/ML (ref 2–4.4)
T4 FREE SERPL-MCNC: 1.33 NG/DL (ref 0.93–1.7)
TRIGL SERPL-MCNC: 76 MG/DL (ref 0–149)
TSH SERPL DL<=0.005 MIU/L-ACNC: 1.08 UIU/ML (ref 0.38–5.33)
WBC # BLD AUTO: 8.1 K/UL (ref 4.8–10.8)

## 2022-06-15 PROCEDURE — 86800 THYROGLOBULIN ANTIBODY: CPT

## 2022-06-15 PROCEDURE — 80061 LIPID PANEL: CPT

## 2022-06-15 PROCEDURE — 84439 ASSAY OF FREE THYROXINE: CPT

## 2022-06-15 PROCEDURE — 85025 COMPLETE CBC W/AUTO DIFF WBC: CPT

## 2022-06-15 PROCEDURE — 84481 FREE ASSAY (FT-3): CPT

## 2022-06-15 PROCEDURE — 36415 COLL VENOUS BLD VENIPUNCTURE: CPT

## 2022-06-15 PROCEDURE — 84402 ASSAY OF FREE TESTOSTERONE: CPT

## 2022-06-15 PROCEDURE — 80053 COMPREHEN METABOLIC PANEL: CPT

## 2022-06-15 PROCEDURE — 84403 ASSAY OF TOTAL TESTOSTERONE: CPT

## 2022-06-15 PROCEDURE — 84270 ASSAY OF SEX HORMONE GLOBUL: CPT

## 2022-06-15 PROCEDURE — 84443 ASSAY THYROID STIM HORMONE: CPT

## 2022-06-15 PROCEDURE — 82306 VITAMIN D 25 HYDROXY: CPT

## 2022-06-17 LAB
SHBG SERPL-SCNC: 50 NMOL/L (ref 19–76)
TESTOST FREE MFR SERPL: 1.6 % (ref 1.6–2.9)
TESTOST FREE SERPL-MCNC: 126 PG/ML (ref 47–244)
TESTOST SERPL-MCNC: 789 NG/DL (ref 300–720)
THYROGLOB AB SERPL-ACNC: <0.9 IU/ML (ref 0–4)

## 2022-06-30 DIAGNOSIS — E78.5 DYSLIPIDEMIA: ICD-10-CM

## 2022-06-30 RX ORDER — ATORVASTATIN CALCIUM 40 MG/1
40 TABLET, FILM COATED ORAL EVERY EVENING
Qty: 90 TABLET | Refills: 3 | Status: SHIPPED | OUTPATIENT
Start: 2022-06-30 | End: 2023-07-08 | Stop reason: SDUPTHER

## 2022-07-31 ENCOUNTER — OFFICE VISIT (OUTPATIENT)
Dept: URGENT CARE | Facility: PHYSICIAN GROUP | Age: 82
End: 2022-07-31
Payer: COMMERCIAL

## 2022-07-31 VITALS
SYSTOLIC BLOOD PRESSURE: 146 MMHG | OXYGEN SATURATION: 95 % | DIASTOLIC BLOOD PRESSURE: 80 MMHG | RESPIRATION RATE: 16 BRPM | BODY MASS INDEX: 26.77 KG/M2 | HEART RATE: 74 BPM | WEIGHT: 202 LBS | HEIGHT: 73 IN | TEMPERATURE: 97.9 F

## 2022-07-31 DIAGNOSIS — L03.113 CELLULITIS OF RIGHT UPPER EXTREMITY: ICD-10-CM

## 2022-07-31 DIAGNOSIS — Z23 NEED FOR TDAP VACCINATION: ICD-10-CM

## 2022-07-31 PROCEDURE — 90715 TDAP VACCINE 7 YRS/> IM: CPT | Performed by: EMERGENCY MEDICINE

## 2022-07-31 PROCEDURE — 90471 IMMUNIZATION ADMIN: CPT | Performed by: EMERGENCY MEDICINE

## 2022-07-31 PROCEDURE — 99203 OFFICE O/P NEW LOW 30 MIN: CPT | Mod: 25 | Performed by: EMERGENCY MEDICINE

## 2022-07-31 RX ORDER — CEPHALEXIN 500 MG/1
500 CAPSULE ORAL 4 TIMES DAILY
Qty: 20 CAPSULE | Refills: 0 | Status: SHIPPED | OUTPATIENT
Start: 2022-07-31 | End: 2022-08-05

## 2022-07-31 RX ORDER — DOXYCYCLINE 100 MG/1
100 CAPSULE ORAL 2 TIMES DAILY
Qty: 10 CAPSULE | Refills: 0 | Status: SHIPPED | OUTPATIENT
Start: 2022-07-31 | End: 2022-08-05

## 2022-07-31 ASSESSMENT — ENCOUNTER SYMPTOMS
SENSORY CHANGE: 0
FEVER: 0
FOCAL WEAKNESS: 0

## 2022-07-31 ASSESSMENT — FIBROSIS 4 INDEX: FIB4 SCORE: 1.37

## 2022-07-31 NOTE — PROGRESS NOTES
"Subjective     Jason Pearson is a 81 y.o. male who presents with Rash (Right arm rash and swelling )            Rash  This is a new problem. Episode onset: 3 days. The problem has been gradually worsening since onset. The affected locations include the right wrist and right arm. The rash is characterized by redness and swelling. Pertinent negatives include no fever or joint pain.   Patient notes a few days ago had small puncture wound on right dorsal lateral hand.  Last few days noted redness, swelling of right wrist and forearm, gradually worsening.  No joint pain, notes no significant discomfort currently.  Uncertain last tetanus immunization.    Review of Systems   Constitutional: Negative for fever and malaise/fatigue.   Musculoskeletal: Negative for joint pain.   Skin: Positive for rash. Negative for itching.   Neurological: Negative for sensory change and focal weakness.              Objective     BP (!) 146/80   Pulse 74   Temp 36.6 °C (97.9 °F) (Temporal)   Resp 16   Ht 1.854 m (6' 1\")   Wt 91.6 kg (202 lb)   SpO2 95%   BMI 26.65 kg/m²      Physical Exam  Constitutional:       Appearance: He is well-developed. He is not ill-appearing.   Cardiovascular:      Pulses:           Radial pulses are 2+ on the right side.   Lymphadenopathy:      Comments: No lymphangitis proximally.   Skin:            Comments: Both dorsal forearms with hyperpigmentation, bruising.   Neurological:      Mental Status: He is alert.      Comments: Distal motor function intact.   Psychiatric:         Behavior: Behavior is cooperative.                             Assessment & Plan        1. Cellulitis of right upper extremity  Rest and elevate the right arm for the first 2 to 3 days  - cephALEXin (KEFLEX) 500 MG Cap; Take 1 Capsule by mouth 4 times a day for 5 days.  Dispense: 20 Capsule; Refill: 0  - doxycycline (MONODOX) 100 MG capsule; Take 1 Capsule by mouth 2 times a day for 5 days.  Dispense: 10 Capsule; Refill: " 0  Recheck in 2 to 3 days unless resolving.  2. Need for Tdap vaccination  - Tdap =>6yo IM

## 2022-08-08 ENCOUNTER — OFFICE VISIT (OUTPATIENT)
Dept: SLEEP MEDICINE | Facility: MEDICAL CENTER | Age: 82
End: 2022-08-08
Payer: COMMERCIAL

## 2022-08-08 VITALS
DIASTOLIC BLOOD PRESSURE: 84 MMHG | OXYGEN SATURATION: 95 % | HEART RATE: 68 BPM | HEIGHT: 73 IN | WEIGHT: 236.9 LBS | SYSTOLIC BLOOD PRESSURE: 152 MMHG | BODY MASS INDEX: 31.4 KG/M2

## 2022-08-08 DIAGNOSIS — Z87.891 FORMER SMOKER: ICD-10-CM

## 2022-08-08 DIAGNOSIS — G47.34 NOCTURNAL HYPOXIA: ICD-10-CM

## 2022-08-08 DIAGNOSIS — J44.9 CHRONIC OBSTRUCTIVE PULMONARY DISEASE, UNSPECIFIED COPD TYPE (HCC): ICD-10-CM

## 2022-08-08 DIAGNOSIS — I50.22 CHRONIC SYSTOLIC HEART FAILURE (HCC): ICD-10-CM

## 2022-08-08 PROCEDURE — 99214 OFFICE O/P EST MOD 30 MIN: CPT | Performed by: INTERNAL MEDICINE

## 2022-08-08 ASSESSMENT — ENCOUNTER SYMPTOMS
SORE THROAT: 0
ABDOMINAL PAIN: 0
FALLS: 0
ABDOMINAL PAIN: 0
DIZZINESS: 0
WEIGHT LOSS: 0
NEAR-SYNCOPE: 0
COUGH: 0
SPEECH CHANGE: 0
WHEEZING: 0
EYE REDNESS: 0
SPUTUM PRODUCTION: 0
EYE DISCHARGE: 0
DIZZINESS: 0
CHILLS: 0
PALPITATIONS: 0
FOCAL WEAKNESS: 0
VOMITING: 0
PALPITATIONS: 0
HEADACHES: 0
WHEEZING: 0
BLURRED VISION: 0
FEVER: 0
DYSPNEA ON EXERTION: 0
ORTHOPNEA: 0
CONSTIPATION: 0
TREMORS: 0
SHORTNESS OF BREATH: 0
SYNCOPE: 0
NECK PAIN: 0
NAUSEA: 0
IRREGULAR HEARTBEAT: 0
DIARRHEA: 0
FEVER: 0
HEARTBURN: 0
DIARRHEA: 0
NIGHT SWEATS: 0
HEMOPTYSIS: 0
DEPRESSION: 0
COUGH: 0
BACK PAIN: 0
PHOTOPHOBIA: 0
ORTHOPNEA: 0
MYALGIAS: 0
SINUS PAIN: 0
PND: 0
DIAPHORESIS: 0
NAUSEA: 0
WEAKNESS: 0
CLAUDICATION: 0
EYE PAIN: 0
FOCAL WEAKNESS: 0
DOUBLE VISION: 0
WEAKNESS: 0
VOMITING: 0
SHORTNESS OF BREATH: 0
PND: 0
STRIDOR: 0

## 2022-08-08 ASSESSMENT — FIBROSIS 4 INDEX: FIB4 SCORE: 1.37

## 2022-08-08 NOTE — PROGRESS NOTES
Chief Complaint   Patient presents with   • COPD         HPI: This patient is a 81 y.o. male whom is followed in our clinic for COPD last seen by me on 9/15/21.  Patient's past medical history significant for hypertension, peripheral vascular disease, COPD, nocturnal hypoxia on supplemental oxygen with sleep study from 2015 showing no evidence of MIKA.  He is a former smoker with roughly 50-pack-year history and quit in 2011.  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  [facility] and symptoms were controlled on Advair 250/50 and short acting bronchodilators.  Pulmonary function testing from 2015 showed FEV1 of 2.22 L or 60% predicted with FEV1/FVC ratio of 66.  There was significant bronchodilator response, normal total lung capacity 95% predicted with mild air trapping and elevated DLCO 136% predicted.  Updated PFTs from 7/2021 showed FEV1 of 1.93 L or 58% predicted with normal lung volumes and normal DLCO, not significantly different from 2015.  Chest x-ray showed clear lung fields.  He did have some chest burning and SOB at our first visit and was subsequently dx with non-ischemic HFrEF, EF of 35% now followed by cardiology. Since opitimizing his cardiac regimen, he can mow his lawn w/o stopping. He is now on anoro and has never been tx for COPD exacerbation. No use of JULES. He does use O2 at 2LPM at night with benefit. No acute complaints today.     Past Medical History:   Diagnosis Date   • Arthritis    • Bowel habit changes     constipation    • Breath shortness     chronic, uses 2L o2 at night ([facility])    • Bronchitis 1960   • Chest pain 4/5/2012    Coronary angiogram showed no evidence of CAD.   • Chronic obstructive pulmonary disease (HCC)    • Disorder of thyroid     hypothyroid    • EMPHYSEMA    • Heart burn    • Hemorrhagic disorder (HCC)     bleeds easily    • High cholesterol    • Hypertension    •  Indigestion    • Left bundle branch block    • Pain     Right calf pain, started about 3 weeks ago. pt denies swelling.   • Pneumonia 2004   • PVD (peripheral vascular disease) ([facility])    • Tuberculosis 1953    received treatment        Social History     Socioeconomic History   • Marital status: Single     Spouse name: Not on file   • Number of children: Not on file   • Years of education: Not on file   • Highest education level: Not on file   Occupational History   • Not on file   Tobacco Use   • Smoking status: Former Smoker     Packs/day: 1.00     Years: 50.00     Pack years: 50.00     Types: Cigarettes     Quit date: 10/4/2011     Years since quitting: 10.8   • Smokeless tobacco: Never Used   Vaping Use   • Vaping Use: Never used   Substance and Sexual Activity   • Alcohol use: No   • Drug use: Never   • Sexual activity: Not on file   Other Topics Concern   • Not on file   Social History Narrative   • Not on file     Social Determinants of Health     Financial Resource Strain: Not on file   Food Insecurity: Not on file   Transportation Needs: Not on file   Physical Activity: Not on file   Stress: Not on file   Social Connections: Not on file   Intimate Partner Violence: Not on file   Housing Stability: Not on file       Family History   Problem Relation Age of Onset   • Heart Disease Mother    • Heart Disease Father        Current Outpatient Medications on File Prior to Visit   Medication Sig Dispense Refill   • atorvastatin (LIPITOR) 40 MG Tab Take 1 Tablet by mouth every evening. 90 Tablet 3   • metoprolol SR (TOPROL XL) 50 MG TABLET SR 24 HR Take 1 Tablet by mouth every day. 90 Tablet 3   • losartan (COZAAR) 50 MG Tab Take 1 Tablet by mouth every day. 90 Tablet 3   • DENTA 5000 PLUS 1.1 % Cream Take 1 Application by mouth every evening. USE AS DIRECTED     • Polyvinyl Alcohol-Povidone (REFRESH OP) Administer 2 Drops into both eyes 2 times a day.     • testosterone cypionate (DEPO-TESTOSTERONE) 200 MG/ML  "Solution injection Inject  into the shoulder, thigh, or buttocks every 14 days. 0.6 ml     • clonazePAM (KLONOPIN) 0.5 MG Tab Take 1 mg by mouth at bedtime as needed.     • levothyroxine (SYNTHROID) 88 MCG Tab Take 88 mcg by mouth every morning on an empty stomach.     • Home Care Oxygen Inhale at bedtime. At night     • Cholecalciferol (VITAMIN D3) 2000 UNIT TABS Take 1 Tablet by mouth every morning.     • aspirin 81 MG EC tablet Take 81 mg by mouth at bedtime.       No current facility-administered medications on file prior to visit.       Cat hair extract and Iodine      ROS:   Review of Systems   Constitutional: Negative for chills, diaphoresis, fever, malaise/fatigue and weight loss.   HENT: Negative for congestion, ear discharge, ear pain, hearing loss, nosebleeds, sinus pain, sore throat and tinnitus.    Eyes: Negative for blurred vision, double vision, photophobia, pain, discharge and redness.   Respiratory: Negative for cough, hemoptysis, sputum production, shortness of breath, wheezing and stridor.    Cardiovascular: Negative for chest pain, palpitations, orthopnea, claudication, leg swelling and PND.   Gastrointestinal: Negative for abdominal pain, constipation, diarrhea, heartburn, nausea and vomiting.   Genitourinary: Negative for dysuria and urgency.   Musculoskeletal: Negative for back pain, falls, joint pain, myalgias and neck pain.   Skin: Negative for itching and rash.   Neurological: Negative for dizziness, tremors, speech change, focal weakness, weakness and headaches.   Endo/Heme/Allergies: Negative for environmental allergies.   Psychiatric/Behavioral: Negative for depression.       BP (!) 152/84 (BP Location: Right arm, Patient Position: Sitting)   Pulse 68   Ht 1.854 m (6' 1\")   Wt 107 kg (236 lb 14.4 oz)   SpO2 95%   Physical Exam  Vitals reviewed.   Constitutional:       General: He is not in acute distress.     Appearance: Normal appearance. He is normal weight.   HENT:      Head: " Normocephalic and atraumatic.      Right Ear: External ear normal.      Left Ear: External ear normal.      Nose: Nose normal. No congestion.      Mouth/Throat:      Mouth: Mucous membranes are moist.      Pharynx: Oropharynx is clear. No oropharyngeal exudate.   Eyes:      General: No scleral icterus.     Extraocular Movements: Extraocular movements intact.      Conjunctiva/sclera: Conjunctivae normal.      Pupils: Pupils are equal, round, and reactive to light.   Cardiovascular:      Rate and Rhythm: Normal rate and regular rhythm.      Heart sounds: Normal heart sounds. No murmur heard.    No gallop.   Pulmonary:      Effort: Pulmonary effort is normal. No respiratory distress.      Breath sounds: Normal breath sounds. No wheezing or rales.   Abdominal:      General: There is no distension.      Palpations: Abdomen is soft.   Musculoskeletal:         General: Normal range of motion.      Cervical back: Normal range of motion and neck supple.      Right lower leg: No edema.      Left lower leg: No edema.   Skin:     General: Skin is warm and dry.      Findings: No rash.   Neurological:      Mental Status: He is alert and oriented to person, place, and time.      Cranial Nerves: No cranial nerve deficit.   Psychiatric:         Mood and Affect: Mood normal.         Behavior: Behavior normal.         PFTs as reviewed by me personally:as per hPI    Assessment:  1. Chronic obstructive pulmonary disease, unspecified COPD type (HCC)  CANCELED: PULMONARY FUNCTION TESTS -Test requested: Complete Pulmonary Function Test   2. Chronic systolic heart failure (HCC)     3. Former smoker     4. Nocturnal hypoxia         Plan:  1. CHronc, stable. MMRC class II. Continue anoro. Pt declined repeat PFTs as would not . Tobacco free and up to date on vaccines  2. Reduced EF but appears compensated on exam today; followed by cardiology  3. Tobacco free. Has aged out of lung Ca screening  4. Compliant with and benefiting  from O2 at 2LPM at night.  Return in about 1 year (around 8/8/2023) for copd.

## 2022-08-09 ENCOUNTER — OFFICE VISIT (OUTPATIENT)
Dept: CARDIOLOGY | Facility: MEDICAL CENTER | Age: 82
End: 2022-08-09
Payer: COMMERCIAL

## 2022-08-09 VITALS
HEIGHT: 73 IN | SYSTOLIC BLOOD PRESSURE: 122 MMHG | BODY MASS INDEX: 31.68 KG/M2 | WEIGHT: 239 LBS | HEART RATE: 75 BPM | OXYGEN SATURATION: 92 % | RESPIRATION RATE: 16 BRPM | DIASTOLIC BLOOD PRESSURE: 62 MMHG

## 2022-08-09 DIAGNOSIS — I10 ESSENTIAL HYPERTENSION: ICD-10-CM

## 2022-08-09 DIAGNOSIS — I50.20 HFREF (HEART FAILURE WITH REDUCED EJECTION FRACTION) (HCC): ICD-10-CM

## 2022-08-09 DIAGNOSIS — I50.9 CHF (NYHA CLASS II, ACC/AHA STAGE C) (HCC): ICD-10-CM

## 2022-08-09 DIAGNOSIS — I42.8 NONISCHEMIC CARDIOMYOPATHY (HCC): ICD-10-CM

## 2022-08-09 DIAGNOSIS — E78.5 DYSLIPIDEMIA: ICD-10-CM

## 2022-08-09 DIAGNOSIS — I73.9 PAD (PERIPHERAL ARTERY DISEASE) (HCC): ICD-10-CM

## 2022-08-09 PROCEDURE — 99215 OFFICE O/P EST HI 40 MIN: CPT | Performed by: STUDENT IN AN ORGANIZED HEALTH CARE EDUCATION/TRAINING PROGRAM

## 2022-08-09 RX ORDER — METOPROLOL SUCCINATE 50 MG/1
75 TABLET, EXTENDED RELEASE ORAL DAILY
Qty: 135 TABLET | Refills: 3 | Status: SHIPPED | OUTPATIENT
Start: 2022-08-09 | End: 2023-07-08 | Stop reason: SDUPTHER

## 2022-08-09 RX ORDER — SPIRONOLACTONE 25 MG/1
25 TABLET ORAL DAILY
Qty: 90 TABLET | Refills: 3 | Status: SHIPPED | OUTPATIENT
Start: 2022-08-09 | End: 2022-08-15 | Stop reason: SDUPTHER

## 2022-08-09 RX ORDER — SACUBITRIL AND VALSARTAN 49; 51 MG/1; MG/1
1 TABLET, FILM COATED ORAL 2 TIMES DAILY
Qty: 180 TABLET | Refills: 3 | Status: SHIPPED | OUTPATIENT
Start: 2022-08-09 | End: 2023-07-08 | Stop reason: SDUPTHER

## 2022-08-09 RX ORDER — AMOXICILLIN 500 MG/1
2000 CAPSULE ORAL PRN
COMMUNITY
Start: 2022-06-08

## 2022-08-09 ASSESSMENT — FIBROSIS 4 INDEX: FIB4 SCORE: 1.37

## 2022-08-09 NOTE — PROGRESS NOTES
Cardiology Follow up Consultation Note    Date of note:  08/09/22  Primary Care Provider: Mohan Yang M.D.    Patient Name: Jason Pearson     YOB: 1940  MRN:              [id number]    Chief Complaint: Follow-up HFrEF    History of Present Illness: Mr. Jason Pearson is a 81 y.o. male whose current medical problems include HFrEF due to nonischemic cardiomyopathy, hypertension, left bundle branch block (noted first in EKG 2012), chronic obstructive airway disease, and PAD s/p iliac stent who is here for follow-up HFrEF.    The patient was last seen in my clinic on  05/23/22. The patient was doing well during the last visit. Due to elevated BP, metoprolol was increased.  The patient had a repeat echocardiogram on 5/16/2021, which showed LVEF 35%, which is measured lower than his prior echocardiogram.    The patient returns today for follow-up.   The patient reports feeling well today.  He denies any chest pain or shortness of breath on exertion.  He is able to mow the lawn without any problems.  However, he is not able to walk much due to leg issues.  The patient reports that he no longer needs furosemide since last visit.  He denies any orthopnea, PND, or leg swelling.  No palpitations.  No syncope or presyncopal episodes.      Cardiovascular Risk Factors:  1. Smoking status: Former smoker  2. Type II Diabetes Mellitus: None/not recently checked  3. Hypertension: On medication  4. Dyslipidemia: On statin  Cholesterol,Tot   Date Value Ref Range Status   02/16/2021 160 100 - 199 mg/dL Final     LDL   Date Value Ref Range Status   02/16/2021 96 <100 mg/dL Final     HDL   Date Value Ref Range Status   02/16/2021 40 >=40 mg/dL Final     Triglycerides   Date Value Ref Range Status   02/16/2021 121 0 - 149 mg/dL Final     5. Family history of early Coronary Artery Disease in a first degree relative (Male less than 55 years of age; Female less than 65 years of age): None  6.  Obesity and/or Metabolic  Syndrome: BMI 32.02  7. Sedentary lifestyle: Not sedentary, tami lawn    Review of Systems   Constitutional: Negative for fever, malaise/fatigue and night sweats.   Cardiovascular: Negative for chest pain, dyspnea on exertion, irregular heartbeat, leg swelling, near-syncope, orthopnea, palpitations, paroxysmal nocturnal dyspnea and syncope.   Respiratory: Negative for cough, shortness of breath and wheezing.    Gastrointestinal: Negative for abdominal pain, diarrhea, nausea and vomiting.   Neurological: Negative for dizziness, focal weakness and weakness.     All other systems reviewed and are negative.       Current Outpatient Medications   Medication Sig Dispense Refill   • amoxicillin (AMOXIL) 500 MG Cap Take 2,000 mg by mouth as needed. TAKE 4 CAPSULES BY MOUTH 1 HOUR PRIOR TO DENTAL TREATMENT     • sacubitril-valsartan (ENTRESTO) 49-51 MG Tab tablet Take 1 Tablet by mouth 2 times a day. 180 Tablet 3   • metoprolol SR (TOPROL XL) 50 MG TABLET SR 24 HR Take 1.5 Tablets by mouth every day. 135 Tablet 3   • spironolactone (ALDACTONE) 25 MG Tab Take 1 Tablet by mouth every day. 90 Tablet 3   • umeclidinium-vilanterol (ANORO ELLIPTA) 62.5-25 MCG/INH AEROSOL POWDER, BREATH ACTIVATED inhaler Inhale 1 Puff every day. 3 Each 3   • atorvastatin (LIPITOR) 40 MG Tab Take 1 Tablet by mouth every evening. 90 Tablet 3   • DENTA 5000 PLUS 1.1 % Cream Take 1 Application by mouth every evening. USE AS DIRECTED     • Polyvinyl Alcohol-Povidone (REFRESH OP) Administer 2 Drops into both eyes 2 times a day.     • testosterone cypionate (DEPO-TESTOSTERONE) 200 MG/ML Solution injection Inject  into the shoulder, thigh, or buttocks every 14 days. 0.6 ml     • clonazePAM (KLONOPIN) 0.5 MG Tab Take 1 mg by mouth at bedtime as needed.     • levothyroxine (SYNTHROID) 88 MCG Tab Take 88 mcg by mouth every morning on an empty stomach.     • Home Care Oxygen Inhale at bedtime. At night     • Cholecalciferol (VITAMIN D3) 2000 UNIT TABS Take 1  "Tablet by mouth every morning.     • aspirin 81 MG EC tablet Take 81 mg by mouth at bedtime.       No current facility-administered medications for this visit.         Allergies   Allergen Reactions   • Cat Hair Extract Itching   • Iodine      Vomiting          Physical Exam:  Ambulatory Vitals  /62 (BP Location: Left arm, Patient Position: Sitting, BP Cuff Size: Adult)   Pulse 75   Resp 16   Ht 1.854 m (6' 1\")   Wt 108 kg (239 lb)   SpO2 92%    Oxygen Therapy:  Pulse Oximetry: 92 %  BP Readings from Last 4 Encounters:   08/09/22 122/62   08/08/22 (!) 152/84   07/31/22 (!) 146/80   05/23/22 (!) 142/82       Weight/BMI: Body mass index is 31.53 kg/m².  Wt Readings from Last 4 Encounters:   08/09/22 108 kg (239 lb)   08/08/22 107 kg (236 lb 14.4 oz)   07/31/22 91.6 kg (202 lb)   05/23/22 108 kg (238 lb 9.6 oz)         General: Well appearing and in no apparent distress  Eyes: nl conjunctiva, no icteric sclera  ENT: wearing a mask, normal external appearance of ears  Neck: no visible JVP,  no carotid bruits  Lungs: normal respiratory effort, CTAB  Heart: RRR, no murmurs, no rubs or gallops, trace edema bilateral lower extremities. No LV/RV heave on cardiac palpatation. + bilateral radial pulses.  + bilateral dp pulses.   Abdomen: soft, non tender, non distended, no masses, normal bowel sounds.  No HSM.  Extremities/MSK: no clubbing, no cyanosis  Neurological: No focal sensory deficits  Psychiatric: Appropriate affect, A/O x 3, intact judgement and insight  Skin: Warm extremities      Lab Data Review:  Lab Results   Component Value Date/Time    CHOLSTRLTOT 106 06/15/2022 09:44 AM    LDL 51 06/15/2022 09:44 AM    HDL 40 06/15/2022 09:44 AM    TRIGLYCERIDE 76 06/15/2022 09:44 AM       Lab Results   Component Value Date/Time    SODIUM 142 06/15/2022 09:44 AM    POTASSIUM 4.5 06/15/2022 09:44 AM    CHLORIDE 106 06/15/2022 09:44 AM    CO2 25 06/15/2022 09:44 AM    GLUCOSE 93 06/15/2022 09:44 AM    BUN 14 " 06/15/2022 09:44 AM    CREATININE 1.36 06/15/2022 09:44 AM     Lab Results   Component Value Date/Time    ALKPHOSPHAT 91 06/15/2022 09:44 AM    ASTSGOT 20 06/15/2022 09:44 AM    ALTSGPT 31 06/15/2022 09:44 AM    TBILIRUBIN 0.7 06/15/2022 09:44 AM      Lab Results   Component Value Date/Time    WBC 8.1 06/15/2022 09:44 AM     No results found for: HBA1C      Cardiac Imaging and Procedures Review:    EKG dated 6/4/2021: My personal interpretation is Sinus rhythm, LBBB    Coronary angiogram 8/17/2021  HEMODYNAMICS:   1. Aortic pressure: 147/66 mmHg  2. Pre A-wave pressure: 15 mmHg  3. No significant aortic gradient on pullback     CORONARY ANGIOGRAPHY:  1. The left main coronary artery is a short, patent vessel that bifurcates into the left anterior descending and left circumflex coronary arteries.  2. The left anterior descending coronary artery a large, transapical vessel that supplies a moderate first diagonal branch, a moderate second diagonal branch, and a large third diagonal branch and has minimal luminal irregularities in the distribution.  3. The left circumflex coronary artery is a large, nondominant vessel that supplies a small first obtuse marginal branch, a moderate second obtuse marginal branch, and a large third obtuse marginal branch and has minimal luminal irregularities in the distribution.  4. The right coronary artery is a large, dominant vessel that supplies a large posterior descending artery and a large posterolateral system and has minimal luminal irregularities in the distribution.     IMPRESSION:  1. Minimal luminal coronary artery disease  2. Borderline elevated left heart filling pressures     RECOMMENDATIONS:  1. Recover in post procedure unit  2. TR band release per protocol  3. Continue optimal medical therapy for nonischemic cardiomyopathy  4. Aggressive cardiac risk factor management    Echocardiogram 5/6/2022  CONCLUSIONS  The left ventricle is mildly dilated.  The left ventricular  ejection fraction is visually estimated to be 35%.  There is abnormal septal motion consistent with underlying conduction   delay.  Grade I diastolic dysfunction.  The right ventricle is normal in size and systolic function.  Unable to estimate pulmonary artery pressure due to an inadequate   tricuspid regurgitant jet.  The left atrium is normal in size.  No significant valvular abnormalities.  Probably normal inferior vena cava size and inspiratory collapse.    Echocardiogram 8/10/2021  CONCLUSIONS  Mildly reduced left ventricular systolic function. Left ventricular   ejection fraction is visually estimated to be 45%.   Grade I diastolic dysfunction.  Dyskinesis of the interventricular septum, consistent with left bundle   branch block.   Normal right ventricular size and systolic function.  No significant valvular abnormalities.   No prior study is available for comparison.     Nuclear stress test 6/22/2021  NUCLEAR IMAGING INTERPRETATION   Small nonreversible defect in the apical septal (LAD) region.     No reversible ischemia.    Global hypokinesis with LVEF 33% (however, LVEF is better assessed via    echocardiogram).    Assessment & Plan     1. HFrEF (heart failure with reduced ejection fraction) ([facility])  sacubitril-valsartan (ENTRESTO) 49-51 MG Tab tablet    metoprolol SR (TOPROL XL) 50 MG TABLET SR 24 HR    EC-ECHOCARDIOGRAM COMPLETE W/O CONT    spironolactone (ALDACTONE) 25 MG Tab    Basic Metabolic Panel   2. Nonischemic cardiomyopathy ([facility])  sacubitril-valsartan (ENTRESTO) 49-51 MG Tab tablet    metoprolol SR (TOPROL XL) 50 MG TABLET SR 24 HR    EC-ECHOCARDIOGRAM COMPLETE W/O CONT    spironolactone (ALDACTONE) 25 MG Tab    Basic Metabolic Panel   3. CHF (NYHA class II, ACC/AHA stage C) ([facility])  sacubitril-valsartan (ENTRESTO) 49-51 MG Tab tablet    metoprolol SR (TOPROL XL) 50 MG TABLET SR 24 HR    EC-ECHOCARDIOGRAM COMPLETE W/O CONT    spironolactone (ALDACTONE) 25 MG Tab    Basic Metabolic Panel   4.  Essential hypertension  sacubitril-valsartan (ENTRESTO) 49-51 MG Tab tablet    metoprolol SR (TOPROL XL) 50 MG TABLET SR 24 HR    EC-ECHOCARDIOGRAM COMPLETE W/O CONT    spironolactone (ALDACTONE) 25 MG Tab    Basic Metabolic Panel   5. Dyslipidemia     6. PAD (peripheral artery disease) ([facility])           Shared Medical Decision Making:    HFrEF  Nonischemic cardiomyopathy  NYHA class II stage C heart failure  Coronary angiogram 8/17/2021 showed minimal luminal coronary disease.  Euvolemic on exam.    -Patient no longer requiring furosemide  -Based on the overall clinical history and profile, patient is a good candidate for Entresto therapy (with superiority over ACE-I/ARB therapy in terms of survival benefits and prevention of future hospitalization).  We will stop losartan 50 mg daily  Will start Entresto therapy at 49-51 mg po bid.  -Start Aldactone 12.5 mg daily  -Increase metoprolol to 75 mg daily  -LVEF is at 35 to 40%, no indication for ICD at this time.  Repeat echocardiogram prior to next visit    Hypertension  Blood pressure well controlled this visit.  The patient reports that home BP at times are systolic 150s.  -Increase metoprolol as above  -Start Aldactone as above  -Transition from losartan to Entresto as above    Dyslipidemia  -Continue atorvastatin 40 mg daily    History of PAD status post iliac stent  Right leg arterial ultrasound obtained after the last visit without significant arterial occlusive disease  -Continue aspirin and statin as above    A total of 41 minutes of time was spent on day of encounter reviewing medical record, performing history and examination, counseling, ordering medication/test/consults and documentation.    All of the patient's excellent questions were answered to the best of my knowledge and to his satisfaction.  It was a pleasure seeing Mr. Jason Pearson in my clinic today. Return in about 3 months (around 11/9/2022). Patient is aware to call the cardiology clinic with  any questions or concerns.      Hayes Mccall MD  [facility], [address]  [address] [address]  [place] [address]  Phone: [phone]  Fax: [phone]

## 2022-08-09 NOTE — PATIENT INSTRUCTIONS
-Stop losartan  -Start Entresto 49-51mg twice daily (if expensive, go back to losartan)  -Increase metoprolol to 75mg daily  -Start aldactone 12.5mg daily  -Schedule echocardiogram prior to next visit. Labs prior to next visit    -Try to be active 30 mins 3 times a week - join community center or gym.

## 2022-08-12 ENCOUNTER — HOSPITAL ENCOUNTER (OUTPATIENT)
Dept: CARDIOLOGY | Facility: MEDICAL CENTER | Age: 82
End: 2022-08-12
Attending: STUDENT IN AN ORGANIZED HEALTH CARE EDUCATION/TRAINING PROGRAM
Payer: COMMERCIAL

## 2022-08-12 ENCOUNTER — PATIENT MESSAGE (OUTPATIENT)
Dept: CARDIOLOGY | Facility: MEDICAL CENTER | Age: 82
End: 2022-08-12

## 2022-08-12 DIAGNOSIS — I10 ESSENTIAL HYPERTENSION: ICD-10-CM

## 2022-08-12 DIAGNOSIS — R06.09 DYSPNEA ON EXERTION: ICD-10-CM

## 2022-08-12 DIAGNOSIS — I42.8 NONISCHEMIC CARDIOMYOPATHY (HCC): ICD-10-CM

## 2022-08-12 DIAGNOSIS — I50.9 CHF (NYHA CLASS II, ACC/AHA STAGE C) (HCC): ICD-10-CM

## 2022-08-12 DIAGNOSIS — I50.20 HFREF (HEART FAILURE WITH REDUCED EJECTION FRACTION) (HCC): ICD-10-CM

## 2022-08-12 LAB
LV EJECT FRACT  99904: 45
LV EJECT FRACT MOD 2C 99903: 55.05
LV EJECT FRACT MOD 4C 99902: 38.66
LV EJECT FRACT MOD BP 99901: 49.33

## 2022-08-12 PROCEDURE — 93306 TTE W/DOPPLER COMPLETE: CPT

## 2022-08-12 PROCEDURE — 93306 TTE W/DOPPLER COMPLETE: CPT | Mod: 26 | Performed by: STUDENT IN AN ORGANIZED HEALTH CARE EDUCATION/TRAINING PROGRAM

## 2022-08-12 NOTE — PATIENT COMMUNICATION
CAROLE Ray R.N. Hi Jamie,   His echo got done today, which is stable since 3 months ago (supposed to be done closer to next visit). We wanted to see how he does on Entresto after 3 months - could you let them know that we would need a repeat limited echo with contrast prior to his next visit?  I'm not sure how it got scheduled for today.  Thanks!   -[employer] message sent to patient- Limited echo ordered

## 2022-08-15 DIAGNOSIS — I50.9 CHF (NYHA CLASS II, ACC/AHA STAGE C) (HCC): ICD-10-CM

## 2022-08-15 DIAGNOSIS — I10 ESSENTIAL HYPERTENSION: ICD-10-CM

## 2022-08-15 DIAGNOSIS — I50.20 HFREF (HEART FAILURE WITH REDUCED EJECTION FRACTION) (HCC): ICD-10-CM

## 2022-08-15 DIAGNOSIS — I42.8 NONISCHEMIC CARDIOMYOPATHY (HCC): ICD-10-CM

## 2022-08-15 RX ORDER — SPIRONOLACTONE 25 MG/1
12.5 TABLET ORAL DAILY
Qty: 90 TABLET | Refills: 3 | Status: SHIPPED | OUTPATIENT
Start: 2022-08-15 | End: 2023-08-09

## 2022-08-15 RX ORDER — PREDNISONE 20 MG/1
20 TABLET ORAL EVERY 6 HOURS
Qty: 4 TABLET | Refills: 0 | Status: SHIPPED | OUTPATIENT
Start: 2022-08-15 | End: 2022-08-16

## 2022-08-15 NOTE — TELEPHONE ENCOUNTER
Is the patient due for a refill? No    Was the patient seen the past year? Yes    Date of last office visit: 8/9/22    Does the patient have an upcoming appointment?  Yes   If yes, When? 11/11/22    Provider to refill: HK    Does the patients insurance require a 100 day supply?  No    spironolactone (ALDACTONE) 25 MG Tab 90 Tablet 3/3 8/15/2022     Sig - Route: Take 0.5 Tablets by mouth every day. - Oral    Sent to pharmacy as: Spironolactone 25 MG Oral Tablet (ALDACTONE)    Notes to Pharmacy: Dose adjusted    Cosign for Ordering: Accepted by Hayes Mccall M.D. on 8/15/2022  8:42 AM    E-Prescribing Status: Receipt confirmed by pharmacy (8/15/2022  7:50 AM PDT)

## 2022-08-15 NOTE — PATIENT COMMUNICATION
HFrEF  Nonischemic cardiomyopathy  NYHA class II stage C heart failure  Coronary angiogram 8/17/2021 showed minimal luminal coronary disease.  Euvolemic on exam.    -Patient no longer requiring furosemide  -Based on the overall clinical history and profile, patient is a good candidate for Entresto therapy (with superiority over ACE-I/ARB therapy in terms of survival benefits and prevention of future hospitalization).  We will stop losartan 50 mg daily  Will start Entresto therapy at 49-51 mg po bid.  -Start Aldactone 12.5 mg daily  -Increase metoprolol to 75 mg daily  -LVEF is at 35 to 40%, no indication for ICD at this time.  Repeat echocardiogram prior to next visit    Medrec updated to reflect proper dose.     Iodine allergy protocol ordered to local retail pharmacy

## 2022-08-17 RX ORDER — SPIRONOLACTONE 25 MG/1
12.5 TABLET ORAL DAILY
Qty: 90 TABLET | Refills: 3 | OUTPATIENT
Start: 2022-08-17

## 2022-08-17 NOTE — TELEPHONE ENCOUNTER
Outpatient Medication Detail     Disp Refills Start End    spironolactone (ALDACTONE) 25 MG Tab 90 Tablet 3/3 8/15/2022     Sig - Route: Take 0.5 Tablets by mouth every day. - Oral    Sent to pharmacy as: Spironolactone 25 MG Oral Tablet (ALDACTONE)    Notes to Pharmacy: Dose adjusted    Cosign for Ordering: Accepted by Hayes Mccall M.D. on 8/15/2022  8:42 AM    E-Prescribing Status: Receipt confirmed by pharmacy (8/15/2022  7:50 AM PDT

## 2022-09-20 ENCOUNTER — HOSPITAL ENCOUNTER (OUTPATIENT)
Dept: LAB | Facility: MEDICAL CENTER | Age: 82
End: 2022-09-20
Attending: STUDENT IN AN ORGANIZED HEALTH CARE EDUCATION/TRAINING PROGRAM
Payer: COMMERCIAL

## 2022-09-20 LAB
25(OH)D3 SERPL-MCNC: 45 NG/ML (ref 30–100)
ALBUMIN SERPL BCP-MCNC: 3.8 G/DL (ref 3.2–4.9)
ALBUMIN/GLOB SERPL: 1.2 G/DL
ALP SERPL-CCNC: 81 U/L (ref 30–99)
ALT SERPL-CCNC: 21 U/L (ref 2–50)
ANION GAP SERPL CALC-SCNC: 13 MMOL/L (ref 7–16)
AST SERPL-CCNC: 21 U/L (ref 12–45)
BASOPHILS # BLD AUTO: 1 % (ref 0–1.8)
BASOPHILS # BLD: 0.07 K/UL (ref 0–0.12)
BILIRUB SERPL-MCNC: 0.7 MG/DL (ref 0.1–1.5)
BUN SERPL-MCNC: 20 MG/DL (ref 8–22)
CALCIUM SERPL-MCNC: 9.2 MG/DL (ref 8.5–10.5)
CHLORIDE SERPL-SCNC: 102 MMOL/L (ref 96–112)
CHOLEST SERPL-MCNC: 123 MG/DL (ref 100–199)
CO2 SERPL-SCNC: 22 MMOL/L (ref 20–33)
CREAT SERPL-MCNC: 1.36 MG/DL (ref 0.5–1.4)
EOSINOPHIL # BLD AUTO: 0.19 K/UL (ref 0–0.51)
EOSINOPHIL NFR BLD: 2.6 % (ref 0–6.9)
ERYTHROCYTE [DISTWIDTH] IN BLOOD BY AUTOMATED COUNT: 50.1 FL (ref 35.9–50)
FASTING STATUS PATIENT QL REPORTED: NORMAL
GFR SERPLBLD CREATININE-BSD FMLA CKD-EPI: 53 ML/MIN/1.73 M 2
GLOBULIN SER CALC-MCNC: 3.3 G/DL (ref 1.9–3.5)
GLUCOSE SERPL-MCNC: 90 MG/DL (ref 65–99)
HCT VFR BLD AUTO: 57.3 % (ref 42–52)
HDLC SERPL-MCNC: 38 MG/DL
HGB BLD-MCNC: 18.6 G/DL (ref 14–18)
IMM GRANULOCYTES # BLD AUTO: 0.02 K/UL (ref 0–0.11)
IMM GRANULOCYTES NFR BLD AUTO: 0.3 % (ref 0–0.9)
LDLC SERPL CALC-MCNC: 61 MG/DL
LYMPHOCYTES # BLD AUTO: 2.57 K/UL (ref 1–4.8)
LYMPHOCYTES NFR BLD: 34.9 % (ref 22–41)
MCH RBC QN AUTO: 30.7 PG (ref 27–33)
MCHC RBC AUTO-ENTMCNC: 32.5 G/DL (ref 33.7–35.3)
MCV RBC AUTO: 94.6 FL (ref 81.4–97.8)
MONOCYTES # BLD AUTO: 0.84 K/UL (ref 0–0.85)
MONOCYTES NFR BLD AUTO: 11.4 % (ref 0–13.4)
NEUTROPHILS # BLD AUTO: 3.67 K/UL (ref 1.82–7.42)
NEUTROPHILS NFR BLD: 49.8 % (ref 44–72)
NRBC # BLD AUTO: 0 K/UL
NRBC BLD-RTO: 0 /100 WBC
PLATELET # BLD AUTO: 226 K/UL (ref 164–446)
PMV BLD AUTO: 10.2 FL (ref 9–12.9)
POTASSIUM SERPL-SCNC: 4.3 MMOL/L (ref 3.6–5.5)
PROT SERPL-MCNC: 7.1 G/DL (ref 6–8.2)
PSA SERPL-MCNC: 7 NG/ML (ref 0–4)
RBC # BLD AUTO: 6.06 M/UL (ref 4.7–6.1)
SODIUM SERPL-SCNC: 137 MMOL/L (ref 135–145)
T3FREE SERPL-MCNC: 2.83 PG/ML (ref 2–4.4)
T4 FREE SERPL-MCNC: 1.2 NG/DL (ref 0.93–1.7)
TRIGL SERPL-MCNC: 119 MG/DL (ref 0–149)
TSH SERPL DL<=0.005 MIU/L-ACNC: 1.27 UIU/ML (ref 0.38–5.33)
WBC # BLD AUTO: 7.4 K/UL (ref 4.8–10.8)

## 2022-09-20 PROCEDURE — 80061 LIPID PANEL: CPT

## 2022-09-20 PROCEDURE — 84481 FREE ASSAY (FT-3): CPT

## 2022-09-20 PROCEDURE — 84270 ASSAY OF SEX HORMONE GLOBUL: CPT

## 2022-09-20 PROCEDURE — 84403 ASSAY OF TOTAL TESTOSTERONE: CPT

## 2022-09-20 PROCEDURE — 85025 COMPLETE CBC W/AUTO DIFF WBC: CPT

## 2022-09-20 PROCEDURE — 84153 ASSAY OF PSA TOTAL: CPT

## 2022-09-20 PROCEDURE — 84443 ASSAY THYROID STIM HORMONE: CPT

## 2022-09-20 PROCEDURE — 82306 VITAMIN D 25 HYDROXY: CPT

## 2022-09-20 PROCEDURE — 84402 ASSAY OF FREE TESTOSTERONE: CPT

## 2022-09-20 PROCEDURE — 84439 ASSAY OF FREE THYROXINE: CPT

## 2022-09-20 PROCEDURE — 36415 COLL VENOUS BLD VENIPUNCTURE: CPT

## 2022-09-20 PROCEDURE — 80053 COMPREHEN METABOLIC PANEL: CPT

## 2022-09-24 LAB
SHBG SERPL-SCNC: 48 NMOL/L (ref 19–76)
TESTOST FREE MFR SERPL: 1.7 % (ref 1.6–2.9)
TESTOST FREE SERPL-MCNC: 177 PG/ML (ref 47–244)
TESTOST SERPL-MCNC: 1019 NG/DL (ref 300–720)

## 2022-12-16 ENCOUNTER — HOSPITAL ENCOUNTER (OUTPATIENT)
Dept: CARDIOLOGY | Facility: MEDICAL CENTER | Age: 82
End: 2022-12-16
Attending: STUDENT IN AN ORGANIZED HEALTH CARE EDUCATION/TRAINING PROGRAM
Payer: COMMERCIAL

## 2022-12-16 ENCOUNTER — APPOINTMENT (OUTPATIENT)
Dept: CARDIOLOGY | Facility: MEDICAL CENTER | Age: 82
End: 2022-12-16
Payer: COMMERCIAL

## 2022-12-16 DIAGNOSIS — I50.20 HFREF (HEART FAILURE WITH REDUCED EJECTION FRACTION) (HCC): ICD-10-CM

## 2022-12-16 DIAGNOSIS — R06.09 DYSPNEA ON EXERTION: ICD-10-CM

## 2022-12-16 DIAGNOSIS — I50.9 CHF (NYHA CLASS II, ACC/AHA STAGE C) (HCC): ICD-10-CM

## 2022-12-16 PROCEDURE — 93325 DOPPLER ECHO COLOR FLOW MAPG: CPT

## 2022-12-21 LAB
LV EJECT FRACT  99904: 45
LV EJECT FRACT MOD 2C 99903: 67.89
LV EJECT FRACT MOD 4C 99902: 42.49
LV EJECT FRACT MOD BP 99901: 56.38

## 2022-12-21 PROCEDURE — 93325 DOPPLER ECHO COLOR FLOW MAPG: CPT | Mod: 26 | Performed by: INTERNAL MEDICINE

## 2022-12-21 PROCEDURE — 93308 TTE F-UP OR LMTD: CPT | Mod: 26 | Performed by: INTERNAL MEDICINE

## 2023-01-11 ENCOUNTER — HOSPITAL ENCOUNTER (OUTPATIENT)
Dept: LAB | Facility: MEDICAL CENTER | Age: 83
End: 2023-01-11
Attending: STUDENT IN AN ORGANIZED HEALTH CARE EDUCATION/TRAINING PROGRAM
Payer: COMMERCIAL

## 2023-01-11 LAB
25(OH)D3 SERPL-MCNC: 45 NG/ML (ref 30–100)
ALBUMIN SERPL BCP-MCNC: 4.1 G/DL (ref 3.2–4.9)
ALBUMIN/GLOB SERPL: 1.2 G/DL
ALP SERPL-CCNC: 89 U/L (ref 30–99)
ALT SERPL-CCNC: 25 U/L (ref 2–50)
ANION GAP SERPL CALC-SCNC: 10 MMOL/L (ref 7–16)
AST SERPL-CCNC: 21 U/L (ref 12–45)
BASOPHILS # BLD AUTO: 0.8 % (ref 0–1.8)
BASOPHILS # BLD: 0.06 K/UL (ref 0–0.12)
BILIRUB SERPL-MCNC: 0.7 MG/DL (ref 0.1–1.5)
BUN SERPL-MCNC: 23 MG/DL (ref 8–22)
CALCIUM ALBUM COR SERPL-MCNC: 9.3 MG/DL (ref 8.5–10.5)
CALCIUM SERPL-MCNC: 9.4 MG/DL (ref 8.5–10.5)
CHLORIDE SERPL-SCNC: 104 MMOL/L (ref 96–112)
CHOLEST SERPL-MCNC: 128 MG/DL (ref 100–199)
CO2 SERPL-SCNC: 25 MMOL/L (ref 20–33)
CREAT SERPL-MCNC: 1.26 MG/DL (ref 0.5–1.4)
EOSINOPHIL # BLD AUTO: 0.11 K/UL (ref 0–0.51)
EOSINOPHIL NFR BLD: 1.5 % (ref 0–6.9)
ERYTHROCYTE [DISTWIDTH] IN BLOOD BY AUTOMATED COUNT: 47.1 FL (ref 35.9–50)
FASTING STATUS PATIENT QL REPORTED: NORMAL
GFR SERPLBLD CREATININE-BSD FMLA CKD-EPI: 58 ML/MIN/1.73 M 2
GLOBULIN SER CALC-MCNC: 3.4 G/DL (ref 1.9–3.5)
GLUCOSE SERPL-MCNC: 86 MG/DL (ref 65–99)
HCT VFR BLD AUTO: 48 % (ref 42–52)
HDLC SERPL-MCNC: 41 MG/DL
HGB BLD-MCNC: 15.8 G/DL (ref 14–18)
IMM GRANULOCYTES # BLD AUTO: 0.02 K/UL (ref 0–0.11)
IMM GRANULOCYTES NFR BLD AUTO: 0.3 % (ref 0–0.9)
LDLC SERPL CALC-MCNC: 73 MG/DL
LYMPHOCYTES # BLD AUTO: 2.82 K/UL (ref 1–4.8)
LYMPHOCYTES NFR BLD: 37.6 % (ref 22–41)
MCH RBC QN AUTO: 31.4 PG (ref 27–33)
MCHC RBC AUTO-ENTMCNC: 32.9 G/DL (ref 33.7–35.3)
MCV RBC AUTO: 95.4 FL (ref 81.4–97.8)
MONOCYTES # BLD AUTO: 0.67 K/UL (ref 0–0.85)
MONOCYTES NFR BLD AUTO: 8.9 % (ref 0–13.4)
NEUTROPHILS # BLD AUTO: 3.82 K/UL (ref 1.82–7.42)
NEUTROPHILS NFR BLD: 50.9 % (ref 44–72)
NRBC # BLD AUTO: 0 K/UL
NRBC BLD-RTO: 0 /100 WBC
PLATELET # BLD AUTO: 240 K/UL (ref 164–446)
PMV BLD AUTO: 10.7 FL (ref 9–12.9)
POTASSIUM SERPL-SCNC: 4.6 MMOL/L (ref 3.6–5.5)
PROT SERPL-MCNC: 7.5 G/DL (ref 6–8.2)
RBC # BLD AUTO: 5.03 M/UL (ref 4.7–6.1)
SODIUM SERPL-SCNC: 139 MMOL/L (ref 135–145)
T3FREE SERPL-MCNC: 2.48 PG/ML (ref 2–4.4)
T4 FREE SERPL-MCNC: 1.38 NG/DL (ref 0.93–1.7)
TRIGL SERPL-MCNC: 71 MG/DL (ref 0–149)
TSH SERPL DL<=0.005 MIU/L-ACNC: 0.37 UIU/ML (ref 0.38–5.33)
WBC # BLD AUTO: 7.5 K/UL (ref 4.8–10.8)

## 2023-01-11 PROCEDURE — 85025 COMPLETE CBC W/AUTO DIFF WBC: CPT

## 2023-01-11 PROCEDURE — 82306 VITAMIN D 25 HYDROXY: CPT

## 2023-01-11 PROCEDURE — 84481 FREE ASSAY (FT-3): CPT

## 2023-01-11 PROCEDURE — 84402 ASSAY OF FREE TESTOSTERONE: CPT

## 2023-01-11 PROCEDURE — 84443 ASSAY THYROID STIM HORMONE: CPT

## 2023-01-11 PROCEDURE — 84270 ASSAY OF SEX HORMONE GLOBUL: CPT

## 2023-01-11 PROCEDURE — 84439 ASSAY OF FREE THYROXINE: CPT

## 2023-01-11 PROCEDURE — 84403 ASSAY OF TOTAL TESTOSTERONE: CPT

## 2023-01-11 PROCEDURE — 80061 LIPID PANEL: CPT

## 2023-01-11 PROCEDURE — 80053 COMPREHEN METABOLIC PANEL: CPT

## 2023-01-11 PROCEDURE — 36415 COLL VENOUS BLD VENIPUNCTURE: CPT

## 2023-01-13 LAB
SHBG SERPL-SCNC: 51 NMOL/L (ref 19–76)
TESTOST FREE MFR SERPL: 1.3 % (ref 1.6–2.9)
TESTOST FREE SERPL-MCNC: 7 PG/ML (ref 47–244)
TESTOST SERPL-MCNC: 56 NG/DL (ref 300–720)

## 2023-01-20 ENCOUNTER — OFFICE VISIT (OUTPATIENT)
Dept: CARDIOLOGY | Facility: MEDICAL CENTER | Age: 83
End: 2023-01-20
Payer: COMMERCIAL

## 2023-01-20 VITALS
DIASTOLIC BLOOD PRESSURE: 60 MMHG | HEIGHT: 73 IN | RESPIRATION RATE: 16 BRPM | SYSTOLIC BLOOD PRESSURE: 104 MMHG | OXYGEN SATURATION: 96 % | BODY MASS INDEX: 31.81 KG/M2 | HEART RATE: 59 BPM | WEIGHT: 240 LBS

## 2023-01-20 DIAGNOSIS — E78.5 DYSLIPIDEMIA: ICD-10-CM

## 2023-01-20 DIAGNOSIS — I50.9 CHF (NYHA CLASS II, ACC/AHA STAGE C) (HCC): ICD-10-CM

## 2023-01-20 DIAGNOSIS — I10 ESSENTIAL HYPERTENSION: ICD-10-CM

## 2023-01-20 DIAGNOSIS — I42.8 NONISCHEMIC CARDIOMYOPATHY (HCC): ICD-10-CM

## 2023-01-20 DIAGNOSIS — R05.9 COUGH, UNSPECIFIED TYPE: ICD-10-CM

## 2023-01-20 DIAGNOSIS — Z79.899 HIGH RISK MEDICATION USE: ICD-10-CM

## 2023-01-20 DIAGNOSIS — I50.20 HFREF (HEART FAILURE WITH REDUCED EJECTION FRACTION) (HCC): ICD-10-CM

## 2023-01-20 DIAGNOSIS — I73.9 PAD (PERIPHERAL ARTERY DISEASE) (HCC): ICD-10-CM

## 2023-01-20 PROCEDURE — 99214 OFFICE O/P EST MOD 30 MIN: CPT | Performed by: NURSE PRACTITIONER

## 2023-01-20 ASSESSMENT — ENCOUNTER SYMPTOMS
DIZZINESS: 0
COUGH: 0
PND: 0
PALPITATIONS: 0
SHORTNESS OF BREATH: 1
MYALGIAS: 0
ORTHOPNEA: 0
ABDOMINAL PAIN: 0
CLAUDICATION: 0
FEVER: 0

## 2023-01-20 ASSESSMENT — FIBROSIS 4 INDEX: FIB4 SCORE: 1.4

## 2023-01-20 NOTE — PROGRESS NOTES
Chief Complaint   Patient presents with    Congestive Heart Failure     F/V DX: HFrEF (heart failure with reduced ejection fraction) (Formerly [facility])        Subjective     Jason Pearson is a 82 y.o. male who presents today for follow-up on his heart failure, hypertension.    Patient of Dr. Mccall.  He was last seen in clinic on 8/9/2022 with Dr. Mccall.  During that visit, losartan was stopped, patient was started on Entresto, started on spironolactone 12.5 mg daily and metoprolol SR was increased to 75 mg daily.  He reports no problems with the medication changes.  Patient also had a repeat echocardiogram since.    Patient does report feeling tired and having mild shortness of breath with exertion, but states it has improved overall.  He denies chest pain, palpitations, orthopnea, PND or dizziness/lightheadedness.  Patient does mention having a little bit of a cough and is unsure if it is due to allergies or possibly one of the medications.    He believes his fatigue is due to low testosterone.  He was previously started on testosterone, but it was discontinued due to abnormal blood count.    Patient is not weighing himself at home.    He currently is not exercising, but states he does chores around his home and works at the food bank once a week.  He also currently is taking care of his girlfriend who recently had surgery and is not able to get around well.    Additonally, patient has the following medical problems:    -Minimal CAD    -Former smoker    -DLD    -PAD, s/p iliac stent    Past Medical History:   Diagnosis Date    Arthritis     Bowel habit changes     constipation     Breath shortness     chronic, uses 2L o2 at night ([facility])     Bronchitis 1960    Chest pain 4/5/2012    Coronary angiogram showed no evidence of CAD.    Chronic obstructive pulmonary disease (HCC)     Disorder of thyroid     hypothyroid     EMPHYSEMA     Heart burn     Hemorrhagic disorder (HCC)     bleeds easily     High cholesterol      Hypertension     Indigestion     Left bundle branch block     Pain     Right calf pain, started about 3 weeks ago. pt denies swelling.    Pneumonia     PVD (peripheral vascular disease) (HCC)     Tuberculosis 1953    received treatment      Past Surgical History:   Procedure Laterality Date    STENT PLACEMENT  1994    bilateral iliac stent placement.    FOOT SURGERY      OTHER      finger surgery     OTHER      hemorrhoiectomy x2 in 70s or 80s    SINUSOTOMIES      SPINAL FORAMINOTOMY       Family History   Problem Relation Age of Onset    Heart Disease Mother     Heart Disease Father      Social History     Socioeconomic History    Marital status: Single     Spouse name: Not on file    Number of children: Not on file    Years of education: Not on file    Highest education level: Not on file   Occupational History    Not on file   Tobacco Use    Smoking status: Former     Packs/day: 1.00     Years: 50.00     Pack years: 50.00     Types: Cigarettes     Quit date: 10/4/2011     Years since quittin.3    Smokeless tobacco: Never   Vaping Use    Vaping Use: Never used   Substance and Sexual Activity    Alcohol use: No    Drug use: Never    Sexual activity: Not on file   Other Topics Concern    Not on file   Social History Narrative    Not on file     Social Determinants of Health     Financial Resource Strain: Not on file   Food Insecurity: Not on file   Transportation Needs: Not on file   Physical Activity: Not on file   Stress: Not on file   Social Connections: Not on file   Intimate Partner Violence: Not on file   Housing Stability: Not on file     Allergies   Allergen Reactions    Cat Hair Extract Itching    Iodine      Vomiting      Outpatient Encounter Medications as of 2023   Medication Sig Dispense Refill    spironolactone (ALDACTONE) 25 MG Tab Take 0.5 Tablets by mouth every day. 90 Tablet 3    amoxicillin (AMOXIL) 500 MG Cap Take 2,000 mg by mouth as needed. TAKE 4 CAPSULES BY MOUTH 1 HOUR PRIOR  TO DENTAL TREATMENT      sacubitril-valsartan (ENTRESTO) 49-51 MG Tab tablet Take 1 Tablet by mouth 2 times a day. 180 Tablet 3    metoprolol SR (TOPROL XL) 50 MG TABLET SR 24 HR Take 1.5 Tablets by mouth every day. 135 Tablet 3    umeclidinium-vilanterol (ANORO ELLIPTA) 62.5-25 MCG/INH AEROSOL POWDER, BREATH ACTIVATED inhaler Inhale 1 Puff every day. 3 Each 3    atorvastatin (LIPITOR) 40 MG Tab Take 1 Tablet by mouth every evening. 90 Tablet 3    DENTA 5000 PLUS 1.1 % Cream Take 1 Application by mouth every evening. USE AS DIRECTED      clonazePAM (KLONOPIN) 0.5 MG Tab Take 1 mg by mouth at bedtime as needed.      levothyroxine (SYNTHROID) 88 MCG Tab Take 88 mcg by mouth every morning on an empty stomach.      Cholecalciferol (VITAMIN D3) 2000 UNIT TABS Take 1 Tablet by mouth every morning.      aspirin 81 MG EC tablet Take 81 mg by mouth at bedtime.      [DISCONTINUED] diphenhydrAMINE (BENADRYL) 50 MG tablet Take 1 Tablet by mouth as needed for Itching (Take 1 hour before imaging procedure). 1 Tablet 0    Polyvinyl Alcohol-Povidone (REFRESH OP) Administer 2 Drops into both eyes 2 times a day. (Patient not taking: Reported on 1/20/2023)      Home Care Oxygen Inhale at bedtime. At night (Patient not taking: Reported on 1/20/2023)      [DISCONTINUED] testosterone cypionate (DEPO-TESTOSTERONE) 200 MG/ML Solution injection Inject  into the shoulder, thigh, or buttocks every 14 days. 0.6 ml (Patient not taking: Reported on 1/20/2023)       No facility-administered encounter medications on file as of 1/20/2023.     Review of Systems   Constitutional:  Positive for malaise/fatigue. Negative for fever.   Respiratory:  Positive for shortness of breath. Negative for cough.    Cardiovascular:  Negative for chest pain, palpitations, orthopnea, claudication, leg swelling and PND.   Gastrointestinal:  Negative for abdominal pain.   Musculoskeletal:  Negative for myalgias.   Neurological:  Negative for dizziness.   All other  "systems reviewed and are negative.           Objective     /60 (BP Location: Left arm, Patient Position: Sitting, BP Cuff Size: Adult)   Pulse (!) 59   Resp 16   Ht 1.854 m (6' 1\")   Wt 109 kg (240 lb)   SpO2 96%   BMI 31.66 kg/m²     Physical Exam  Vitals reviewed.   Constitutional:       Appearance: He is well-developed.   HENT:      Head: Normocephalic and atraumatic.   Eyes:      Pupils: Pupils are equal, round, and reactive to light.   Neck:      Vascular: No JVD.   Cardiovascular:      Rate and Rhythm: Normal rate and regular rhythm.      Heart sounds: Normal heart sounds.   Pulmonary:      Effort: Pulmonary effort is normal. No respiratory distress.      Breath sounds: Normal breath sounds. No wheezing or rales.   Abdominal:      General: Bowel sounds are normal.      Palpations: Abdomen is soft.   Musculoskeletal:         General: Normal range of motion.      Cervical back: Normal range of motion and neck supple.      Right lower leg: No edema.      Left lower leg: No edema.   Skin:     General: Skin is warm and dry.   Neurological:      General: No focal deficit present.      Mental Status: He is alert and oriented to person, place, and time.   Psychiatric:         Behavior: Behavior normal.          Lab Results   Component Value Date/Time    CHOLSTRLTOT 128 01/11/2023 02:03 PM    LDL 73 01/11/2023 02:03 PM    HDL 41 01/11/2023 02:03 PM    TRIGLYCERIDE 71 01/11/2023 02:03 PM       Lab Results   Component Value Date/Time    SODIUM 139 01/11/2023 02:03 PM    POTASSIUM 4.6 01/11/2023 02:03 PM    CHLORIDE 104 01/11/2023 02:03 PM    CO2 25 01/11/2023 02:03 PM    GLUCOSE 86 01/11/2023 02:03 PM    BUN 23 (H) 01/11/2023 02:03 PM    CREATININE 1.26 01/11/2023 02:03 PM     Lab Results   Component Value Date/Time    ALKPHOSPHAT 89 01/11/2023 02:03 PM    ASTSGOT 21 01/11/2023 02:03 PM    ALTSGPT 25 01/11/2023 02:03 PM    TBILIRUBIN 0.7 01/11/2023 02:03 PM         Myocardial Perfusion Report 6/22/2021   " NUCLEAR IMAGING INTERPRETATION   Small nonreversible defect in the apical septal (LAD) region.     No reversible ischemia.    Global hypokinesis with LVEF 33% (however, LVEF is better assessed via    echocardiogram).      Transthoracic Echo Report 8/10/2021  Mildly reduced left ventricular systolic function. Left ventricular   ejection fraction is visually estimated to be 45%.   Grade I diastolic dysfunction.  Dyskinesis of the interventricular septum, consistent with left bundle   branch block.   Normal right ventricular size and systolic function.  No significant valvular abnormalities.   No prior study is available for comparison.     Cardiac catheterization 8/17/2021  HEMODYNAMICS:   Aortic pressure: 147/66 mmHg  Pre A-wave pressure: 15 mmHg  No significant aortic gradient on pullback     CORONARY ANGIOGRAPHY:  The left main coronary artery is a short, patent vessel that bifurcates into the left anterior descending and left circumflex coronary arteries.  The left anterior descending coronary artery a large, transapical vessel that supplies a moderate first diagonal branch, a moderate second diagonal branch, and a large third diagonal branch and has minimal luminal irregularities in the distribution.  The left circumflex coronary artery is a large, nondominant vessel that supplies a small first obtuse marginal branch, a moderate second obtuse marginal branch, and a large third obtuse marginal branch and has minimal luminal irregularities in the distribution.  The right coronary artery is a large, dominant vessel that supplies a large posterior descending artery and a large posterolateral system and has minimal luminal irregularities in the distribution.     IMPRESSION:  Minimal luminal coronary artery disease  Borderline elevated left heart filling pressures     RECOMMENDATIONS:  Recover in post procedure unit  TR band release per protocol  Continue optimal medical therapy for nonischemic  cardiomyopathy  Aggressive cardiac risk factor management    Transthoracic Echo Report 5/16/2022  The left ventricle is mildly dilated.  The left ventricular ejection fraction is visually estimated to be 35%.  There is abnormal septal motion consistent with underlying conduction delay.  Grade I diastolic dysfunction.  The right ventricle is normal in size and systolic function.  Unable to estimate pulmonary artery pressure due to an inadequate   tricuspid regurgitant jet.  The left atrium is normal in size.  No significant valvular abnormalities.  Probably normal inferior vena cava size and inspiratory collapse.     Transthoracic Echo Report 8/12/2022  Moderately reduced left ventricular systolic function. The left   ventricular ejection fraction is visually estimated to be 35%. Global   hypokinesis.   Grade I diastolic dysfunction.  The right ventricle is normal in size and systolic function.   Mild mitral regurgitation.  Compared to the prior echo on 5/16/22, there are no significant   changes.     Transthoracic Echo Report 12/16/2022  Compared to the images of the prior study 8/12/22, there has been   improvement in the estimated ejection fraction, previously 35%.  Mildly reduced left ventricular systolic function.  The left ventricular ejection fraction is visually estimated to be 45%.  Global hypokinesis with regional variation.     Assessment & Plan     1. HFrEF (heart failure with reduced ejection fraction) ([facility])        2. CHF (NYHA class II, ACC/AHA stage C) ([facility])        3. Nonischemic cardiomyopathy ([facility])        4. High risk medication use        5. Essential hypertension        6. Dyslipidemia        7. PAD (peripheral artery disease) ([facility])        8. Cough, unspecified type              Medical Decision Making: Today's Assessment/Status/Plan:          HFmrEF, Stage C, Class 1-2, LVEF 45%: Based on physical examination findings, patient is euvolemic. No JVD, lungs are clear to auscultation, no pitting edema  in bilateral lower extremities, no ascites.  -Heart failure due to nonischemic cardiomyopathy  -ACE-I/ARB/ARNI: Continue Entresto 49-51 mg twice a day  -Evidence Based Beta-blocker: Continue metoprolol SR 75 mg daily  -Aldosterone Antagonist: Continue spironolactone 12.5 mg daily  -Diuretic: At this time, patient to try taking furosemide 20 mg daily as needed  -SGLT2 inhibitor: Discussed starting SGLT2 inhibitor, at this time, patient declines  -Labs: No labs due at this time, recent labs reviewed with patient  -No indication for ICD is recent LVEF at 45%  -Reinforced s/sx of worsening heart failure with patient and weight monitoring. Pt verbalizes understanding. Pt to call office or RTC if present.    -Start monitoring weights at home daily. Call office if weight increasing greater than 3 lbs in 1 day or greater than 5 lbs in 1 week.    -PUMP line number [phone] (PUMP) reviewed with patient  -Heart Failure Education: Education reinforced  -Advanced care planning: Advanced directive and POLST to be discussed at future visit     Hypertension: Stable  -Recommendations per above    Dyslipidemia:  -Last LDL 51 on 2/5/2022  -Continue aspirin 81 mg daily  -Continue atorvastatin 40 mg daily    PAD, s/p iliac stent:  -ERIBERTO from 8/20/2021 showed mild to moderate arterial disease on right side, no evidence on left side.  -Continue statin and aspirin per above    Cough:  -Discussed with patient to try allergy medication to see if it helps with his symptoms, if not, he can try holding Entresto for few days to see if it helps with the symptoms or metoprolol.  Patient instructed to restart medications after few days and alert our clinic of results.    FU in clinic in 6 months with Dr. Mccall. Sooner if needed.    Patient verbalizes understanding and agrees with the plan of care.     PLEASE NOTE: This Note was created using voice recognition Software. I have made every reasonable attempt to correct obvious errors, but I expect that  there are errors of grammar and possibly content that I did not discover before finalizing the note

## 2023-04-17 ENCOUNTER — HOSPITAL ENCOUNTER (OUTPATIENT)
Dept: LAB | Facility: MEDICAL CENTER | Age: 83
End: 2023-04-17
Attending: PHYSICIAN ASSISTANT
Payer: COMMERCIAL

## 2023-04-17 LAB — PSA SERPL-MCNC: 3.93 NG/ML (ref 0–4)

## 2023-04-17 PROCEDURE — 36415 COLL VENOUS BLD VENIPUNCTURE: CPT

## 2023-04-17 PROCEDURE — 84153 ASSAY OF PSA TOTAL: CPT

## 2023-07-08 DIAGNOSIS — I10 ESSENTIAL HYPERTENSION: ICD-10-CM

## 2023-07-08 DIAGNOSIS — I42.8 NONISCHEMIC CARDIOMYOPATHY (HCC): ICD-10-CM

## 2023-07-08 DIAGNOSIS — I50.9 CHF (NYHA CLASS II, ACC/AHA STAGE C) (HCC): ICD-10-CM

## 2023-07-08 DIAGNOSIS — E78.5 DYSLIPIDEMIA: ICD-10-CM

## 2023-07-08 DIAGNOSIS — I50.20 HFREF (HEART FAILURE WITH REDUCED EJECTION FRACTION) (HCC): ICD-10-CM

## 2023-07-10 RX ORDER — ATORVASTATIN CALCIUM 40 MG/1
40 TABLET, FILM COATED ORAL EVERY EVENING
Qty: 90 TABLET | Refills: 1 | Status: SHIPPED | OUTPATIENT
Start: 2023-07-10 | End: 2023-08-09

## 2023-07-10 RX ORDER — METOPROLOL SUCCINATE 50 MG/1
75 TABLET, EXTENDED RELEASE ORAL DAILY
Qty: 135 TABLET | Refills: 1 | Status: SHIPPED | OUTPATIENT
Start: 2023-07-10 | End: 2024-01-29 | Stop reason: SDUPTHER

## 2023-07-10 RX ORDER — SACUBITRIL AND VALSARTAN 49; 51 MG/1; MG/1
1 TABLET, FILM COATED ORAL 2 TIMES DAILY
Qty: 180 TABLET | Refills: 1 | Status: SHIPPED | OUTPATIENT
Start: 2023-07-10 | End: 2024-01-29 | Stop reason: SDUPTHER

## 2023-07-10 NOTE — TELEPHONE ENCOUNTER
Is the patient due for a refill? Yes    Was the patient seen the past year? Yes    Date of last office visit: 1/20/23    Does the patient have an upcoming appointment?  Yes   If yes, When? 8/09/23 w/ARBEN    Provider to refill:MAYELIN    Does the patients insurance require a 100 day supply?  No

## 2023-07-18 ENCOUNTER — HOSPITAL ENCOUNTER (OUTPATIENT)
Dept: RADIOLOGY | Facility: MEDICAL CENTER | Age: 83
End: 2023-07-18
Attending: PHYSICAL MEDICINE & REHABILITATION
Payer: COMMERCIAL

## 2023-07-18 DIAGNOSIS — M25.562 LEFT KNEE PAIN, UNSPECIFIED CHRONICITY: ICD-10-CM

## 2023-07-18 PROCEDURE — 73721 MRI JNT OF LWR EXTRE W/O DYE: CPT | Mod: LT

## 2023-07-31 ENCOUNTER — HOSPITAL ENCOUNTER (OUTPATIENT)
Dept: LAB | Facility: MEDICAL CENTER | Age: 83
End: 2023-07-31
Attending: STUDENT IN AN ORGANIZED HEALTH CARE EDUCATION/TRAINING PROGRAM
Payer: COMMERCIAL

## 2023-07-31 LAB
ALBUMIN SERPL BCP-MCNC: 3.7 G/DL (ref 3.2–4.9)
ALBUMIN/GLOB SERPL: 1.1 G/DL
ALP SERPL-CCNC: 74 U/L (ref 30–99)
ALT SERPL-CCNC: 14 U/L (ref 2–50)
ANION GAP SERPL CALC-SCNC: 10 MMOL/L (ref 7–16)
AST SERPL-CCNC: 17 U/L (ref 12–45)
BASOPHILS # BLD AUTO: 1 % (ref 0–1.8)
BASOPHILS # BLD: 0.07 K/UL (ref 0–0.12)
BILIRUB SERPL-MCNC: 0.7 MG/DL (ref 0.1–1.5)
BUN SERPL-MCNC: 16 MG/DL (ref 8–22)
CALCIUM ALBUM COR SERPL-MCNC: 9.6 MG/DL (ref 8.5–10.5)
CALCIUM SERPL-MCNC: 9.4 MG/DL (ref 8.5–10.5)
CHLORIDE SERPL-SCNC: 106 MMOL/L (ref 96–112)
CHOLEST SERPL-MCNC: 127 MG/DL (ref 100–199)
CO2 SERPL-SCNC: 22 MMOL/L (ref 20–33)
CREAT SERPL-MCNC: 1.26 MG/DL (ref 0.5–1.4)
EOSINOPHIL # BLD AUTO: 0.35 K/UL (ref 0–0.51)
EOSINOPHIL NFR BLD: 4.9 % (ref 0–6.9)
ERYTHROCYTE [DISTWIDTH] IN BLOOD BY AUTOMATED COUNT: 48.7 FL (ref 35.9–50)
FASTING STATUS PATIENT QL REPORTED: NORMAL
GFR SERPLBLD CREATININE-BSD FMLA CKD-EPI: 57 ML/MIN/1.73 M 2
GLOBULIN SER CALC-MCNC: 3.3 G/DL (ref 1.9–3.5)
GLUCOSE SERPL-MCNC: 89 MG/DL (ref 65–99)
HCT VFR BLD AUTO: 53.9 % (ref 42–52)
HDLC SERPL-MCNC: 38 MG/DL
HGB BLD-MCNC: 17.4 G/DL (ref 14–18)
IMM GRANULOCYTES # BLD AUTO: 0.02 K/UL (ref 0–0.11)
IMM GRANULOCYTES NFR BLD AUTO: 0.3 % (ref 0–0.9)
LDLC SERPL CALC-MCNC: 69 MG/DL
LYMPHOCYTES # BLD AUTO: 2.7 K/UL (ref 1–4.8)
LYMPHOCYTES NFR BLD: 37.6 % (ref 22–41)
MCH RBC QN AUTO: 30.4 PG (ref 27–33)
MCHC RBC AUTO-ENTMCNC: 32.3 G/DL (ref 32.3–36.5)
MCV RBC AUTO: 94.2 FL (ref 81.4–97.8)
MONOCYTES # BLD AUTO: 0.89 K/UL (ref 0–0.85)
MONOCYTES NFR BLD AUTO: 12.4 % (ref 0–13.4)
NEUTROPHILS # BLD AUTO: 3.15 K/UL (ref 1.82–7.42)
NEUTROPHILS NFR BLD: 43.8 % (ref 44–72)
NRBC # BLD AUTO: 0 K/UL
NRBC BLD-RTO: 0 /100 WBC (ref 0–0.2)
PLATELET # BLD AUTO: 199 K/UL (ref 164–446)
PMV BLD AUTO: 10.8 FL (ref 9–12.9)
POTASSIUM SERPL-SCNC: 4.7 MMOL/L (ref 3.6–5.5)
PROT SERPL-MCNC: 7 G/DL (ref 6–8.2)
RBC # BLD AUTO: 5.72 M/UL (ref 4.7–6.1)
SODIUM SERPL-SCNC: 138 MMOL/L (ref 135–145)
T3FREE SERPL-MCNC: 2.7 PG/ML (ref 2–4.4)
T4 FREE SERPL-MCNC: 1.31 NG/DL (ref 0.93–1.7)
TRIGL SERPL-MCNC: 98 MG/DL (ref 0–149)
TSH SERPL DL<=0.005 MIU/L-ACNC: 1.18 UIU/ML (ref 0.38–5.33)
WBC # BLD AUTO: 7.2 K/UL (ref 4.8–10.8)

## 2023-07-31 PROCEDURE — 84481 FREE ASSAY (FT-3): CPT

## 2023-07-31 PROCEDURE — 84270 ASSAY OF SEX HORMONE GLOBUL: CPT

## 2023-07-31 PROCEDURE — 84439 ASSAY OF FREE THYROXINE: CPT

## 2023-07-31 PROCEDURE — 36415 COLL VENOUS BLD VENIPUNCTURE: CPT

## 2023-07-31 PROCEDURE — 84443 ASSAY THYROID STIM HORMONE: CPT

## 2023-07-31 PROCEDURE — 84402 ASSAY OF FREE TESTOSTERONE: CPT

## 2023-07-31 PROCEDURE — 80061 LIPID PANEL: CPT

## 2023-07-31 PROCEDURE — 84403 ASSAY OF TOTAL TESTOSTERONE: CPT

## 2023-07-31 PROCEDURE — 80053 COMPREHEN METABOLIC PANEL: CPT

## 2023-07-31 PROCEDURE — 85025 COMPLETE CBC W/AUTO DIFF WBC: CPT

## 2023-08-01 LAB
SHBG SERPL-SCNC: 47 NMOL/L (ref 19–76)
TESTOST FREE MFR SERPL: 1.6 % (ref 1.6–2.9)
TESTOST FREE SERPL-MCNC: 124 PG/ML (ref 47–244)
TESTOST SERPL-MCNC: 756 NG/DL (ref 300–720)

## 2023-08-08 ASSESSMENT — ENCOUNTER SYMPTOMS
FEVER: 0
DIARRHEA: 0
PALPITATIONS: 0
ABDOMINAL PAIN: 0
DYSPNEA ON EXERTION: 0
SHORTNESS OF BREATH: 0
FOCAL WEAKNESS: 0
NAUSEA: 0
SYNCOPE: 0
PND: 0
COUGH: 0
NIGHT SWEATS: 0
WHEEZING: 0
IRREGULAR HEARTBEAT: 0
WEAKNESS: 0
DIZZINESS: 0
VOMITING: 0
ORTHOPNEA: 0
NEAR-SYNCOPE: 0

## 2023-08-09 ENCOUNTER — OFFICE VISIT (OUTPATIENT)
Dept: CARDIOLOGY | Facility: MEDICAL CENTER | Age: 83
End: 2023-08-09
Attending: STUDENT IN AN ORGANIZED HEALTH CARE EDUCATION/TRAINING PROGRAM
Payer: COMMERCIAL

## 2023-08-09 VITALS
WEIGHT: 239 LBS | OXYGEN SATURATION: 94 % | BODY MASS INDEX: 31.68 KG/M2 | HEIGHT: 73 IN | HEART RATE: 54 BPM | RESPIRATION RATE: 16 BRPM | SYSTOLIC BLOOD PRESSURE: 142 MMHG | DIASTOLIC BLOOD PRESSURE: 76 MMHG

## 2023-08-09 DIAGNOSIS — I73.9 PAD (PERIPHERAL ARTERY DISEASE) (HCC): ICD-10-CM

## 2023-08-09 DIAGNOSIS — I50.9 CHF (NYHA CLASS II, ACC/AHA STAGE C) (HCC): ICD-10-CM

## 2023-08-09 DIAGNOSIS — I10 ESSENTIAL HYPERTENSION: ICD-10-CM

## 2023-08-09 DIAGNOSIS — I50.20 HFREF (HEART FAILURE WITH REDUCED EJECTION FRACTION) (HCC): ICD-10-CM

## 2023-08-09 DIAGNOSIS — I42.8 NONISCHEMIC CARDIOMYOPATHY (HCC): ICD-10-CM

## 2023-08-09 DIAGNOSIS — Z78.9 STATIN INTOLERANCE: ICD-10-CM

## 2023-08-09 DIAGNOSIS — E78.5 DYSLIPIDEMIA: ICD-10-CM

## 2023-08-09 PROCEDURE — 99213 OFFICE O/P EST LOW 20 MIN: CPT | Performed by: STUDENT IN AN ORGANIZED HEALTH CARE EDUCATION/TRAINING PROGRAM

## 2023-08-09 PROCEDURE — 3078F DIAST BP <80 MM HG: CPT | Performed by: STUDENT IN AN ORGANIZED HEALTH CARE EDUCATION/TRAINING PROGRAM

## 2023-08-09 PROCEDURE — 99215 OFFICE O/P EST HI 40 MIN: CPT | Performed by: STUDENT IN AN ORGANIZED HEALTH CARE EDUCATION/TRAINING PROGRAM

## 2023-08-09 PROCEDURE — 3077F SYST BP >= 140 MM HG: CPT | Performed by: STUDENT IN AN ORGANIZED HEALTH CARE EDUCATION/TRAINING PROGRAM

## 2023-08-09 RX ORDER — EZETIMIBE 10 MG/1
10 TABLET ORAL DAILY
Qty: 90 TABLET | Refills: 3 | Status: SHIPPED | OUTPATIENT
Start: 2023-08-09 | End: 2023-08-11 | Stop reason: SDUPTHER

## 2023-08-09 RX ORDER — SPIRONOLACTONE 25 MG/1
25 TABLET ORAL DAILY
Qty: 90 TABLET | Refills: 3 | Status: SHIPPED | OUTPATIENT
Start: 2023-08-09 | End: 2023-08-11 | Stop reason: SDUPTHER

## 2023-08-09 ASSESSMENT — FIBROSIS 4 INDEX: FIB4 SCORE: 1.83

## 2023-08-09 NOTE — PROGRESS NOTES
"    Cardiology Follow up Consultation Note    Date of note:  08/09/23  Primary Care Provider: Mohan Yang M.D.    Patient Name: Jason Pearson     YOB: 1940  MRN:              [id number]    Chief Complaint: Follow-up HFrEF    History of Present Illness: Mr. Jason Pearson is an 82 y.o. male whose current medical problems include HFrEF due to nonischemic cardiomyopathy, hypertension, left bundle branch block (noted first in EKG 2012), chronic obstructive airway disease, and PAD s/p iliac stent who is here for follow-up HFrEF.    The patient was last seen in my clinic on  08/09/22. He was doing well during the last visit.  He was initiated on Entresto and aldactone, and metoprolol was increased.  He then followed with KURT Kilpatrick 1/20/2023. No changes were made to his medications during the last visit.     The patient returns today for follow-up.  The patient presents with his wife.  The patient reports feeling well today.  The patient reports that he stopped taking his statin due to muscle pain, and muscle pain has been better since.  He would like to take a nonstatin for the future.  According to his wife, the patient's stomach feels like a \"watermelon.\"    He denies any chest pain or shortness of breath on exertion.    He denies any orthopnea, PND, or leg swelling.  No palpitations.  No syncope or presyncopal episodes.      Cardiovascular Risk Factors:  1. Smoking status: Former smoker  2. Type II Diabetes Mellitus: None/not recently checked  3. Hypertension: On medication  4. Dyslipidemia: On statin  Lab Results   Component Value Date/Time    CHOLSTRLTOT 127 07/31/2023 0919    TRIGLYCERIDE 98 07/31/2023 0919    HDL 38 (A) 07/31/2023 0919    LDL 69 07/31/2023 0919   5. Family history of early Coronary Artery Disease in a first degree relative (Male less than 55 years of age; Female less than 65 years of age): None  6.  Obesity and/or Metabolic Syndrome: BMI 32.02  7. Sedentary lifestyle: " Not sedentary, mows lawn    Review of Systems   Constitutional: Negative for fever, malaise/fatigue and night sweats.   Cardiovascular:  Negative for chest pain, dyspnea on exertion, irregular heartbeat, leg swelling, near-syncope, orthopnea, palpitations, paroxysmal nocturnal dyspnea and syncope.   Respiratory:  Negative for cough, shortness of breath and wheezing.    Gastrointestinal:  Negative for abdominal pain, diarrhea, nausea and vomiting.   Neurological:  Negative for dizziness, focal weakness and weakness.     All other systems reviewed and are negative.       Current Outpatient Medications   Medication Sig Dispense Refill    ezetimibe (ZETIA) 10 MG Tab Take 1 Tablet by mouth every day. 90 Tablet 3    spironolactone (ALDACTONE) 25 MG Tab Take 1 Tablet by mouth every day. 90 Tablet 3    sacubitril-valsartan (ENTRESTO) 49-51 MG Tab Take 1 Tablet by mouth 2 times a day. 180 Tablet 1    metoprolol SR (TOPROL XL) 50 MG TABLET SR 24 HR Take 1.5 Tablets by mouth every day. 135 Tablet 1    amoxicillin (AMOXIL) 500 MG Cap Take 2,000 mg by mouth as needed. TAKE 4 CAPSULES BY MOUTH 1 HOUR PRIOR TO DENTAL TREATMENT      umeclidinium-vilanterol (ANORO ELLIPTA) 62.5-25 MCG/INH AEROSOL POWDER, BREATH ACTIVATED inhaler Inhale 1 Puff every day. 3 Each 3    DENTA 5000 PLUS 1.1 % Cream Take 1 Application by mouth every evening. USE AS DIRECTED      Polyvinyl Alcohol-Povidone (REFRESH OP) Administer 2 Drops into both eyes 2 times a day.      clonazePAM (KLONOPIN) 0.5 MG Tab Take 1 mg by mouth at bedtime as needed.      levothyroxine (SYNTHROID) 88 MCG Tab Take 88 mcg by mouth every morning on an empty stomach.      Home Care Oxygen Inhale at bedtime. At night      Cholecalciferol (VITAMIN D3) 2000 UNIT TABS Take 1 Tablet by mouth every morning.      aspirin 81 MG EC tablet Take 81 mg by mouth at bedtime.       No current facility-administered medications for this visit.         Allergies   Allergen Reactions    Atorvastatin   "   Cat Hair Extract Itching    Iodine      Vomiting          Physical Exam:  Ambulatory Vitals  BP (!) 142/76 (BP Location: Left arm, Patient Position: Sitting, BP Cuff Size: Adult)   Pulse (!) 54   Resp 16   Ht 1.854 m (6' 1\")   Wt 108 kg (239 lb)   SpO2 94%    Oxygen Therapy:  Pulse Oximetry: 94 %  BP Readings from Last 4 Encounters:   08/09/23 (!) 142/76   01/20/23 104/60   08/09/22 122/62   08/08/22 (!) 152/84       Weight/BMI: Body mass index is 31.53 kg/m².  Wt Readings from Last 4 Encounters:   08/09/23 108 kg (239 lb)   01/20/23 109 kg (240 lb)   08/09/22 108 kg (239 lb)   08/08/22 107 kg (236 lb 14.4 oz)         General: Well appearing and in no apparent distress  Eyes: nl conjunctiva, no icteric sclera  ENT: wearing a mask, normal external appearance of ears  Neck: no visible JVP,  no carotid bruits  Lungs: normal respiratory effort, CTAB  Heart: RRR, no murmurs, no rubs or gallops, trace edema bilateral lower extremities. No LV/RV heave on cardiac palpatation. + bilateral radial pulses.  + bilateral dp pulses.   Abdomen: soft, non tender, non distended, no masses, normal bowel sounds.  No HSM.  Extremities/MSK: no clubbing, no cyanosis  Neurological: No focal sensory deficits  Psychiatric: Appropriate affect, A/O x 3, intact judgement and insight  Skin: Warm extremities      Lab Data Review:  Lab Results   Component Value Date/Time    CHOLSTRLTOT 127 07/31/2023 09:19 AM    LDL 69 07/31/2023 09:19 AM    HDL 38 (A) 07/31/2023 09:19 AM    TRIGLYCERIDE 98 07/31/2023 09:19 AM       Lab Results   Component Value Date/Time    SODIUM 138 07/31/2023 09:19 AM    POTASSIUM 4.7 07/31/2023 09:19 AM    CHLORIDE 106 07/31/2023 09:19 AM    CO2 22 07/31/2023 09:19 AM    GLUCOSE 89 07/31/2023 09:19 AM    BUN 16 07/31/2023 09:19 AM    CREATININE 1.26 07/31/2023 09:19 AM     Lab Results   Component Value Date/Time    ALKPHOSPHAT 74 07/31/2023 09:19 AM    ASTSGOT 17 07/31/2023 09:19 AM    ALTSGPT 14 07/31/2023 09:19 AM "    TBILIRUBIN 0.7 07/31/2023 09:19 AM      Lab Results   Component Value Date/Time    WBC 7.2 07/31/2023 09:19 AM     No results found for: HBA1C      Cardiac Imaging and Procedures Review:    EKG dated 6/4/2021: My personal interpretation is Sinus rhythm, LBBB    Coronary angiogram 8/17/2021  HEMODYNAMICS:   Aortic pressure: 147/66 mmHg  Pre A-wave pressure: 15 mmHg  No significant aortic gradient on pullback     CORONARY ANGIOGRAPHY:  The left main coronary artery is a short, patent vessel that bifurcates into the left anterior descending and left circumflex coronary arteries.  The left anterior descending coronary artery a large, transapical vessel that supplies a moderate first diagonal branch, a moderate second diagonal branch, and a large third diagonal branch and has minimal luminal irregularities in the distribution.  The left circumflex coronary artery is a large, nondominant vessel that supplies a small first obtuse marginal branch, a moderate second obtuse marginal branch, and a large third obtuse marginal branch and has minimal luminal irregularities in the distribution.  The right coronary artery is a large, dominant vessel that supplies a large posterior descending artery and a large posterolateral system and has minimal luminal irregularities in the distribution.     IMPRESSION:  Minimal luminal coronary artery disease  Borderline elevated left heart filling pressures     RECOMMENDATIONS:  Recover in post procedure unit  TR band release per protocol  Continue optimal medical therapy for nonischemic cardiomyopathy  Aggressive cardiac risk factor management    Echocardiogram 12/16/2022   CONCLUSIONS  Compared to the images of the prior study 8/12/22, there has been   improvement in the estimated ejection fraction, previously 35%.  Mildly reduced left ventricular systolic function.  The left ventricular ejection fraction is visually estimated to be 45%.  Global hypokinesis with regional  variation.    Echocardiogram 5/6/2022  CONCLUSIONS  The left ventricle is mildly dilated.  The left ventricular ejection fraction is visually estimated to be 35%.  There is abnormal septal motion consistent with underlying conduction   delay.  Grade I diastolic dysfunction.  The right ventricle is normal in size and systolic function.  Unable to estimate pulmonary artery pressure due to an inadequate   tricuspid regurgitant jet.  The left atrium is normal in size.  No significant valvular abnormalities.  Probably normal inferior vena cava size and inspiratory collapse.    Echocardiogram 8/10/2021  CONCLUSIONS  Mildly reduced left ventricular systolic function. Left ventricular   ejection fraction is visually estimated to be 45%.   Grade I diastolic dysfunction.  Dyskinesis of the interventricular septum, consistent with left bundle   branch block.   Normal right ventricular size and systolic function.  No significant valvular abnormalities.   No prior study is available for comparison.     Nuclear stress test 6/22/2021  NUCLEAR IMAGING INTERPRETATION   Small nonreversible defect in the apical septal (LAD) region.     No reversible ischemia.    Global hypokinesis with LVEF 33% (however, LVEF is better assessed via    echocardiogram).    Assessment & Plan     1. HFrEF (heart failure with reduced ejection fraction) ([facility])  spironolactone (ALDACTONE) 25 MG Tab    Basic Metabolic Panel      2. Nonischemic cardiomyopathy (HCC)  spironolactone (ALDACTONE) 25 MG Tab    Basic Metabolic Panel      3. CHF (NYHA class II, ACC/AHA stage C) ([facility])  spironolactone (ALDACTONE) 25 MG Tab    Basic Metabolic Panel      4. Essential hypertension  spironolactone (ALDACTONE) 25 MG Tab    Basic Metabolic Panel      5. Statin intolerance  ezetimibe (ZETIA) 10 MG Tab    Lipid Profile      6. Dyslipidemia  ezetimibe (ZETIA) 10 MG Tab    Lipid Profile      7. PAD (peripheral artery disease) ([facility])  ezetimibe (ZETIA) 10 MG Tab    Lipid Profile             Shared Medical Decision Making:    HFrEF  Nonischemic cardiomyopathy  NYHA class II stage C heart failure  Coronary angiogram 8/17/2021 showed minimal luminal coronary disease.  Euvolemic on exam.  Patient no longer requiring furosemide  -Continue Entresto 49-51 mg po bid.  -Increase Aldactone to 25 mg daily.  -BMP in 1 week  -Continue metoprolol 75 mg daily  -Echocardiogram 12/2022 with LVEF 45%, no indication for ICD    Hypertension  Blood pressure elevated this visit.  The patient reports that home BP at times are systolic 150s.  -Increase Aldactone as above  -Continue Entresto and metoprolol as above  -Also the patient to measure BP at home.  If home BP remains above 130/80, will add antihypertensives.    Dyslipidemia  Statin intolerance  The patient reports that he has muscle pain on statin, resolved after stopping statin.  The patient request for nonstatin  -Start ezetimibe 10 mg daily  -Repeat lipid panel prior to next visit    History of PAD status post iliac stent  Right leg arterial ultrasound obtained after the last visit without significant arterial occlusive disease  -Continue aspirin   -Start ezetimibe as above    A total of 41 minutes of time was spent on day of encounter reviewing medical record, performing history and examination, counseling, ordering medication/test/consults and documentation.    All of the patient's excellent questions were answered to the best of my knowledge and to his satisfaction.  It was a pleasure seeing Mr. Jason Pearson in my clinic today. Return in about 3 months (around 11/9/2023). Patient is aware to call the cardiology clinic with any questions or concerns.      Hayes Mccall MD  [facility]  [facility], [address]  [address] [place] [address]  Phone: [phone]  Fax: [phone]

## 2023-08-09 NOTE — PATIENT INSTRUCTIONS
-Start ezetimibe 10 mg daily  -Lipid panel (labs) prior to next visit    -Increase aldactone to 25mg daily  -BMP (labs) in one week    -Measure blood pressure at home.  Normal blood pressure is less than 130/80.

## 2023-08-11 ENCOUNTER — TELEPHONE (OUTPATIENT)
Dept: CARDIOLOGY | Facility: MEDICAL CENTER | Age: 83
End: 2023-08-11

## 2023-08-11 DIAGNOSIS — I10 ESSENTIAL HYPERTENSION: ICD-10-CM

## 2023-08-11 DIAGNOSIS — E78.5 DYSLIPIDEMIA: ICD-10-CM

## 2023-08-11 DIAGNOSIS — Z79.899 HIGH RISK MEDICATION USE: ICD-10-CM

## 2023-08-11 DIAGNOSIS — Z78.9 STATIN INTOLERANCE: ICD-10-CM

## 2023-08-11 DIAGNOSIS — I50.20 HFREF (HEART FAILURE WITH REDUCED EJECTION FRACTION) (HCC): ICD-10-CM

## 2023-08-11 DIAGNOSIS — I73.9 PAD (PERIPHERAL ARTERY DISEASE) (HCC): ICD-10-CM

## 2023-08-11 DIAGNOSIS — I42.8 NONISCHEMIC CARDIOMYOPATHY (HCC): ICD-10-CM

## 2023-08-11 DIAGNOSIS — I50.9 CHF (NYHA CLASS II, ACC/AHA STAGE C) (HCC): ICD-10-CM

## 2023-08-11 RX ORDER — SPIRONOLACTONE 25 MG/1
25 TABLET ORAL DAILY
Qty: 90 TABLET | Refills: 3 | Status: SHIPPED | OUTPATIENT
Start: 2023-08-11 | End: 2023-09-08

## 2023-08-11 RX ORDER — EZETIMIBE 10 MG/1
10 TABLET ORAL DAILY
Qty: 90 TABLET | Refills: 3 | Status: SHIPPED | OUTPATIENT
Start: 2023-08-11

## 2023-08-11 NOTE — TELEPHONE ENCOUNTER
[facility]        Caller: Jason Pearson    Medication Name and Dosage: ezetimibe (ZETIA) 10 MG Tab, spironolactone (ALDACTONE) 25 MG Tab      Please call your pharmacy and have them send us a refill request or speak to a live representative, RX number may have changed.    Medication amount left: ZETIA: 0 (new medication)  Spironolactone: 3 months left of his prior prescription    [facility] [place] [address] [address]    Other questions (Topic): n/a    Callback Number (Will only call for issues): [phone]      Thank you    -Rohith ARIZMENDI

## 2023-09-05 ENCOUNTER — HOSPITAL ENCOUNTER (OUTPATIENT)
Dept: LAB | Facility: MEDICAL CENTER | Age: 83
End: 2023-09-05
Attending: STUDENT IN AN ORGANIZED HEALTH CARE EDUCATION/TRAINING PROGRAM
Payer: COMMERCIAL

## 2023-09-05 DIAGNOSIS — I10 ESSENTIAL HYPERTENSION: ICD-10-CM

## 2023-09-05 DIAGNOSIS — I42.8 NONISCHEMIC CARDIOMYOPATHY (HCC): ICD-10-CM

## 2023-09-05 DIAGNOSIS — Z78.9 STATIN INTOLERANCE: ICD-10-CM

## 2023-09-05 DIAGNOSIS — I73.9 PAD (PERIPHERAL ARTERY DISEASE) (HCC): ICD-10-CM

## 2023-09-05 DIAGNOSIS — Z79.899 HIGH RISK MEDICATION USE: ICD-10-CM

## 2023-09-05 DIAGNOSIS — E78.5 DYSLIPIDEMIA: ICD-10-CM

## 2023-09-05 DIAGNOSIS — I50.9 CHF (NYHA CLASS II, ACC/AHA STAGE C) (HCC): ICD-10-CM

## 2023-09-05 DIAGNOSIS — I50.20 HFREF (HEART FAILURE WITH REDUCED EJECTION FRACTION) (HCC): ICD-10-CM

## 2023-09-05 LAB
ANION GAP SERPL CALC-SCNC: 9 MMOL/L (ref 7–16)
BUN SERPL-MCNC: 21 MG/DL (ref 8–22)
CALCIUM SERPL-MCNC: 9.3 MG/DL (ref 8.5–10.5)
CHLORIDE SERPL-SCNC: 107 MMOL/L (ref 96–112)
CHOLEST SERPL-MCNC: 185 MG/DL (ref 100–199)
CO2 SERPL-SCNC: 24 MMOL/L (ref 20–33)
CREAT SERPL-MCNC: 1.43 MG/DL (ref 0.5–1.4)
FASTING STATUS PATIENT QL REPORTED: NORMAL
GFR SERPLBLD CREATININE-BSD FMLA CKD-EPI: 49 ML/MIN/1.73 M 2
GLUCOSE SERPL-MCNC: 96 MG/DL (ref 65–99)
HDLC SERPL-MCNC: 36 MG/DL
LDLC SERPL CALC-MCNC: 121 MG/DL
MAGNESIUM SERPL-MCNC: 2 MG/DL (ref 1.5–2.5)
POTASSIUM SERPL-SCNC: 5.1 MMOL/L (ref 3.6–5.5)
SODIUM SERPL-SCNC: 140 MMOL/L (ref 135–145)
TRIGL SERPL-MCNC: 142 MG/DL (ref 0–149)

## 2023-09-05 PROCEDURE — 36415 COLL VENOUS BLD VENIPUNCTURE: CPT

## 2023-09-05 PROCEDURE — 83735 ASSAY OF MAGNESIUM: CPT

## 2023-09-05 PROCEDURE — 80048 BASIC METABOLIC PNL TOTAL CA: CPT

## 2023-09-05 PROCEDURE — 80061 LIPID PANEL: CPT

## 2023-09-08 ENCOUNTER — TELEPHONE (OUTPATIENT)
Dept: CARDIOLOGY | Facility: MEDICAL CENTER | Age: 83
End: 2023-09-08
Payer: COMMERCIAL

## 2023-09-08 DIAGNOSIS — I50.20 HFREF (HEART FAILURE WITH REDUCED EJECTION FRACTION) (HCC): ICD-10-CM

## 2023-09-08 DIAGNOSIS — I10 ESSENTIAL HYPERTENSION: ICD-10-CM

## 2023-09-08 DIAGNOSIS — Z79.899 HIGH RISK MEDICATION USE: ICD-10-CM

## 2023-09-08 DIAGNOSIS — I50.9 CHF (NYHA CLASS II, ACC/AHA STAGE C) (HCC): ICD-10-CM

## 2023-09-08 DIAGNOSIS — I42.8 NONISCHEMIC CARDIOMYOPATHY (HCC): ICD-10-CM

## 2023-09-08 RX ORDER — SPIRONOLACTONE 25 MG/1
12.5 TABLET ORAL DAILY
Qty: 90 TABLET | Refills: 3
Start: 2023-09-08 | End: 2024-01-29 | Stop reason: SDUPTHER

## 2023-09-08 NOTE — TELEPHONE ENCOUNTER
Called and spoke with patient.    Patient denies any swelling.  Advised patient to reduce dose of Aldactone to 12.5 mg daily per [facility] recommendations.  Advised patient to repeat lab work in one week    Patient verbalized understanding.

## 2023-09-08 NOTE — TELEPHONE ENCOUNTER
----- Message from Hyaes Mccall M.D. sent at 9/7/2023  6:08 PM PDT -----  Please ask if the patient is having any swelling, if not please decrease aldactone back to 12.5mg daily and have him repeat another BMP in a week after. Thank you.   ----- Message -----  From: Megan Batista R.N.  Sent: 9/6/2023   3:17 PM PDT  To: Hayes Mccall M.D.    To ARBEN, f/u 12/1

## 2023-09-08 NOTE — TELEPHONE ENCOUNTER
----- Message from Hayes Mccall M.D. sent at 9/7/2023  6:08 PM PDT -----  Please ask if the patient is having any swelling, if not please decrease aldactone back to 12.5mg daily and have him repeat another BMP in a week after. Thank you.   ----- Message -----  From: Megan Batista R.N.  Sent: 9/6/2023   3:17 PM PDT  To: Hayes Mccall M.D.    To ARBEN, f/u 12/1

## 2023-09-15 ENCOUNTER — HOSPITAL ENCOUNTER (OUTPATIENT)
Dept: LAB | Facility: MEDICAL CENTER | Age: 83
End: 2023-09-15
Attending: STUDENT IN AN ORGANIZED HEALTH CARE EDUCATION/TRAINING PROGRAM
Payer: COMMERCIAL

## 2023-09-15 DIAGNOSIS — I50.9 CHF (NYHA CLASS II, ACC/AHA STAGE C) (HCC): ICD-10-CM

## 2023-09-15 DIAGNOSIS — Z79.899 HIGH RISK MEDICATION USE: ICD-10-CM

## 2023-09-15 LAB
ANION GAP SERPL CALC-SCNC: 10 MMOL/L (ref 7–16)
BUN SERPL-MCNC: 20 MG/DL (ref 8–22)
CALCIUM SERPL-MCNC: 8.9 MG/DL (ref 8.5–10.5)
CHLORIDE SERPL-SCNC: 103 MMOL/L (ref 96–112)
CO2 SERPL-SCNC: 26 MMOL/L (ref 20–33)
CREAT SERPL-MCNC: 1.37 MG/DL (ref 0.5–1.4)
FASTING STATUS PATIENT QL REPORTED: NORMAL
GFR SERPLBLD CREATININE-BSD FMLA CKD-EPI: 52 ML/MIN/1.73 M 2
GLUCOSE SERPL-MCNC: 96 MG/DL (ref 65–99)
POTASSIUM SERPL-SCNC: 4.9 MMOL/L (ref 3.6–5.5)
SODIUM SERPL-SCNC: 139 MMOL/L (ref 135–145)

## 2023-09-15 PROCEDURE — 36415 COLL VENOUS BLD VENIPUNCTURE: CPT

## 2023-09-15 PROCEDURE — 80048 BASIC METABOLIC PNL TOTAL CA: CPT

## 2023-11-21 ENCOUNTER — HOSPITAL ENCOUNTER (OUTPATIENT)
Dept: LAB | Facility: MEDICAL CENTER | Age: 83
End: 2023-11-21
Attending: STUDENT IN AN ORGANIZED HEALTH CARE EDUCATION/TRAINING PROGRAM
Payer: COMMERCIAL

## 2023-11-21 LAB
ANION GAP SERPL CALC-SCNC: 11 MMOL/L (ref 7–16)
BUN SERPL-MCNC: 20 MG/DL (ref 8–22)
CALCIUM SERPL-MCNC: 9 MG/DL (ref 8.5–10.5)
CHLORIDE SERPL-SCNC: 104 MMOL/L (ref 96–112)
CO2 SERPL-SCNC: 23 MMOL/L (ref 20–33)
CREAT SERPL-MCNC: 1.49 MG/DL (ref 0.5–1.4)
FASTING STATUS PATIENT QL REPORTED: NORMAL
GFR SERPLBLD CREATININE-BSD FMLA CKD-EPI: 47 ML/MIN/1.73 M 2
GLUCOSE SERPL-MCNC: 98 MG/DL (ref 65–99)
POTASSIUM SERPL-SCNC: 4.8 MMOL/L (ref 3.6–5.5)
SODIUM SERPL-SCNC: 138 MMOL/L (ref 135–145)

## 2023-11-21 PROCEDURE — 80048 BASIC METABOLIC PNL TOTAL CA: CPT

## 2023-11-21 PROCEDURE — 36415 COLL VENOUS BLD VENIPUNCTURE: CPT

## 2023-11-30 ASSESSMENT — ENCOUNTER SYMPTOMS
DIARRHEA: 0
FOCAL WEAKNESS: 0
VOMITING: 0
ORTHOPNEA: 0
DYSPNEA ON EXERTION: 0
SYNCOPE: 0
NAUSEA: 0
WHEEZING: 0
IRREGULAR HEARTBEAT: 0
COUGH: 0
SHORTNESS OF BREATH: 0
PND: 0
NEAR-SYNCOPE: 0
WEAKNESS: 0
NIGHT SWEATS: 0
ABDOMINAL PAIN: 0
FEVER: 0
DIZZINESS: 0
PALPITATIONS: 0

## 2023-12-01 ENCOUNTER — TELEMEDICINE (OUTPATIENT)
Dept: CARDIOLOGY | Facility: MEDICAL CENTER | Age: 83
End: 2023-12-01
Attending: STUDENT IN AN ORGANIZED HEALTH CARE EDUCATION/TRAINING PROGRAM
Payer: COMMERCIAL

## 2023-12-01 VITALS
HEIGHT: 73 IN | OXYGEN SATURATION: 91 % | DIASTOLIC BLOOD PRESSURE: 70 MMHG | HEART RATE: 72 BPM | SYSTOLIC BLOOD PRESSURE: 128 MMHG | RESPIRATION RATE: 18 BRPM | WEIGHT: 250 LBS | BODY MASS INDEX: 33.13 KG/M2

## 2023-12-01 DIAGNOSIS — E78.5 DYSLIPIDEMIA: ICD-10-CM

## 2023-12-01 DIAGNOSIS — R06.02 SHORTNESS OF BREATH: ICD-10-CM

## 2023-12-01 DIAGNOSIS — I50.9 CHF (NYHA CLASS II, ACC/AHA STAGE C) (HCC): ICD-10-CM

## 2023-12-01 DIAGNOSIS — J44.9 CHRONIC OBSTRUCTIVE PULMONARY DISEASE, UNSPECIFIED COPD TYPE (HCC): ICD-10-CM

## 2023-12-01 DIAGNOSIS — Z78.9 STATIN INTOLERANCE: ICD-10-CM

## 2023-12-01 DIAGNOSIS — I73.9 PAD (PERIPHERAL ARTERY DISEASE) (HCC): ICD-10-CM

## 2023-12-01 DIAGNOSIS — I42.8 NONISCHEMIC CARDIOMYOPATHY (HCC): ICD-10-CM

## 2023-12-01 DIAGNOSIS — I10 ESSENTIAL HYPERTENSION: ICD-10-CM

## 2023-12-01 DIAGNOSIS — I50.20 HFREF (HEART FAILURE WITH REDUCED EJECTION FRACTION) (HCC): ICD-10-CM

## 2023-12-01 PROCEDURE — 99214 OFFICE O/P EST MOD 30 MIN: CPT | Mod: 95 | Performed by: STUDENT IN AN ORGANIZED HEALTH CARE EDUCATION/TRAINING PROGRAM

## 2023-12-01 PROCEDURE — 99213 OFFICE O/P EST LOW 20 MIN: CPT | Performed by: STUDENT IN AN ORGANIZED HEALTH CARE EDUCATION/TRAINING PROGRAM

## 2023-12-01 ASSESSMENT — FIBROSIS 4 INDEX: FIB4 SCORE: 1.83

## 2023-12-01 NOTE — PROGRESS NOTES
Cardiology Follow up Consultation Note/Virtual Video Visit Note     This evaluation was conducted via Zoom using secure and encrypted videoconferencing technology. The patient was in a private location outside of their home in the state [facility].    The patient's identity was confirmed and verbal consent was obtained for this virtual visit.    Date of note:  12/01/23  Primary Care Provider: Mohan Yang M.D.    Patient Name: Jason Pearson     YOB: 1940  MRN:              [id number]    Chief Complaint: Follow-up HFrEF    History of Present Illness: Mr. Jason Pearson is an 82 y.o. male whose current medical problems include HFrEF due to nonischemic cardiomyopathy, hypertension, left bundle branch block (noted first in EKG 2012), chronic obstructive airway disease, and PAD s/p iliac stent who is here for follow-up HFrEF.    The patient was last seen in my clinic on 08/09/23. During the last visit, the patient was doing well, and Aldactone was increased.  However, due to decreased kidney function, Aldactone was decreased back to 12.5 mg daily.    The patient returns today for follow-up.  The patient reports feeling well today.  The patient reports that he continues to have shortness of breath but denies any chest pain.  He denies any orthopnea, PND, or leg swelling.  No palpitations.  No syncope or presyncopal episodes.      Cardiovascular Risk Factors:  1. Smoking status: Former smoker  2. Type II Diabetes Mellitus: None/not recently checked  3. Hypertension: On medication  4. Dyslipidemia: On ezetimibe  Lab Results   Component Value Date/Time    CHOLSTRLTOT 185 09/05/2023 1049    TRIGLYCERIDE 142 09/05/2023 1049    HDL 36 (A) 09/05/2023 1049     (H) 09/05/2023 1049     5. Family history of early Coronary Artery Disease in a first degree relative (Male less than 55 years of age; Female less than 65 years of age): None  6.  Obesity and/or Metabolic Syndrome: BMI 32.02  7. Sedentary  lifestyle: Not sedentary, mows lawn    Review of Systems   Constitutional: Negative for fever, malaise/fatigue and night sweats.   Cardiovascular:  Negative for chest pain, dyspnea on exertion, irregular heartbeat, leg swelling, near-syncope, orthopnea, palpitations, paroxysmal nocturnal dyspnea and syncope.   Respiratory:  Negative for cough, shortness of breath and wheezing.    Gastrointestinal:  Negative for abdominal pain, diarrhea, nausea and vomiting.   Neurological:  Negative for dizziness, focal weakness and weakness.     All other systems reviewed and are negative.       Current Outpatient Medications   Medication Sig Dispense Refill    spironolactone (ALDACTONE) 25 MG Tab Take 0.5 Tablets by mouth every day. 90 Tablet 3    ezetimibe (ZETIA) 10 MG Tab Take 1 Tablet by mouth every day. 90 Tablet 3    sacubitril-valsartan (ENTRESTO) 49-51 MG Tab Take 1 Tablet by mouth 2 times a day. 180 Tablet 1    metoprolol SR (TOPROL XL) 50 MG TABLET SR 24 HR Take 1.5 Tablets by mouth every day. 135 Tablet 1    amoxicillin (AMOXIL) 500 MG Cap Take 2,000 mg by mouth as needed. TAKE 4 CAPSULES BY MOUTH 1 HOUR PRIOR TO DENTAL TREATMENT      umeclidinium-vilanterol (ANORO ELLIPTA) 62.5-25 MCG/INH AEROSOL POWDER, BREATH ACTIVATED inhaler Inhale 1 Puff every day. 3 Each 3    DENTA 5000 PLUS 1.1 % Cream Take 1 Application by mouth every evening. USE AS DIRECTED      Polyvinyl Alcohol-Povidone (REFRESH OP) Administer 2 Drops into both eyes 2 times a day.      clonazePAM (KLONOPIN) 0.5 MG Tab Take 1 mg by mouth at bedtime as needed.      levothyroxine (SYNTHROID) 88 MCG Tab Take 88 mcg by mouth every morning on an empty stomach.      Home Care Oxygen Inhale at bedtime. At night      Cholecalciferol (VITAMIN D3) 2000 UNIT TABS Take 1 Tablet by mouth every morning.      aspirin 81 MG EC tablet Take 81 mg by mouth at bedtime.       No current facility-administered medications for this visit.         Allergies   Allergen Reactions     "Atorvastatin     Cat Hair Extract Itching    Iodine      Vomiting          Physical Exam:  Ambulatory Vitals  /70 (BP Location: Left arm, Patient Position: Sitting, BP Cuff Size: Adult)   Pulse 72   Resp 18   Ht 1.854 m (6' 1\")   Wt 113 kg (250 lb)   SpO2 91%    Oxygen Therapy:  Pulse Oximetry: 91 %  BP Readings from Last 4 Encounters:   12/01/23 128/70   08/09/23 (!) 142/76   01/20/23 104/60   08/09/22 122/62       Weight/BMI: Body mass index is 32.98 kg/m².  Wt Readings from Last 4 Encounters:   12/01/23 113 kg (250 lb)   08/09/23 108 kg (239 lb)   01/20/23 109 kg (240 lb)   08/09/22 108 kg (239 lb)         General: Well appearing and in no apparent distress  HEENT: Normal  Neurological: No focal sensory deficits  Psychiatric: Appropriate affect, A/O x 3, intact judgement and insight        Lab Data Review:  Lab Results   Component Value Date/Time    CHOLSTRLTOT 185 09/05/2023 10:49 AM     (H) 09/05/2023 10:49 AM    HDL 36 (A) 09/05/2023 10:49 AM    TRIGLYCERIDE 142 09/05/2023 10:49 AM       Lab Results   Component Value Date/Time    SODIUM 138 11/21/2023 11:13 AM    POTASSIUM 4.8 11/21/2023 11:13 AM    CHLORIDE 104 11/21/2023 11:13 AM    CO2 23 11/21/2023 11:13 AM    GLUCOSE 98 11/21/2023 11:13 AM    BUN 20 11/21/2023 11:13 AM    CREATININE 1.49 (H) 11/21/2023 11:13 AM     Lab Results   Component Value Date/Time    ALKPHOSPHAT 74 07/31/2023 09:19 AM    ASTSGOT 17 07/31/2023 09:19 AM    ALTSGPT 14 07/31/2023 09:19 AM    TBILIRUBIN 0.7 07/31/2023 09:19 AM      Lab Results   Component Value Date/Time    WBC 7.2 07/31/2023 09:19 AM    HEMOGLOBIN 17.4 07/31/2023 09:19 AM     No results found for: \"HBA1C\"      Cardiac Imaging and Procedures Review:    EKG dated 6/4/2021: My personal interpretation is Sinus rhythm, LBBB    Coronary angiogram 8/17/2021  HEMODYNAMICS:   Aortic pressure: 147/66 mmHg  Pre A-wave pressure: 15 mmHg  No significant aortic gradient on pullback     CORONARY ANGIOGRAPHY:  The " left main coronary artery is a short, patent vessel that bifurcates into the left anterior descending and left circumflex coronary arteries.  The left anterior descending coronary artery a large, transapical vessel that supplies a moderate first diagonal branch, a moderate second diagonal branch, and a large third diagonal branch and has minimal luminal irregularities in the distribution.  The left circumflex coronary artery is a large, nondominant vessel that supplies a small first obtuse marginal branch, a moderate second obtuse marginal branch, and a large third obtuse marginal branch and has minimal luminal irregularities in the distribution.  The right coronary artery is a large, dominant vessel that supplies a large posterior descending artery and a large posterolateral system and has minimal luminal irregularities in the distribution.     IMPRESSION:  Minimal luminal coronary artery disease  Borderline elevated left heart filling pressures     RECOMMENDATIONS:  Recover in post procedure unit  TR band release per protocol  Continue optimal medical therapy for nonischemic cardiomyopathy  Aggressive cardiac risk factor management    Echocardiogram 12/16/2022   CONCLUSIONS  Compared to the images of the prior study 8/12/22, there has been   improvement in the estimated ejection fraction, previously 35%.  Mildly reduced left ventricular systolic function.  The left ventricular ejection fraction is visually estimated to be 45%.  Global hypokinesis with regional variation.    Echocardiogram 5/6/2022  CONCLUSIONS  The left ventricle is mildly dilated.  The left ventricular ejection fraction is visually estimated to be 35%.  There is abnormal septal motion consistent with underlying conduction   delay.  Grade I diastolic dysfunction.  The right ventricle is normal in size and systolic function.  Unable to estimate pulmonary artery pressure due to an inadequate   tricuspid regurgitant jet.  The left atrium is normal in  size.  No significant valvular abnormalities.  Probably normal inferior vena cava size and inspiratory collapse.    Echocardiogram 8/10/2021  CONCLUSIONS  Mildly reduced left ventricular systolic function. Left ventricular   ejection fraction is visually estimated to be 45%.   Grade I diastolic dysfunction.  Dyskinesis of the interventricular septum, consistent with left bundle   branch block.   Normal right ventricular size and systolic function.  No significant valvular abnormalities.   No prior study is available for comparison.     Nuclear stress test 6/22/2021  NUCLEAR IMAGING INTERPRETATION   Small nonreversible defect in the apical septal (LAD) region.     No reversible ischemia.    Global hypokinesis with LVEF 33% (however, LVEF is better assessed via    echocardiogram).    Assessment & Plan     1. HFrEF (heart failure with reduced ejection fraction) ([facility])  Basic Metabolic Panel      2. CHF (NYHA class II, ACC/AHA stage C) ([facility])  Basic Metabolic Panel      3. Nonischemic cardiomyopathy ([facility])  Basic Metabolic Panel      4. Essential hypertension  Basic Metabolic Panel      5. Dyslipidemia        6. Statin intolerance        7. PAD (peripheral artery disease) ([facility])        8. Chronic obstructive pulmonary disease, unspecified COPD type ([facility])        9. Shortness of breath              Shared Medical Decision Making:    HFrEF  Nonischemic cardiomyopathy  NYHA class II stage C heart failure  Coronary angiogram 8/17/2021 showed minimal luminal coronary disease.  Euvolemic on exam.  Patient no longer requiring furosemide  -Continue Entresto 49-51 mg po bid.  -Continue Aldactone 12.5 mg daily (unable to tolerate a higher dose previously).  -Continue metoprolol 75 mg daily  -Echocardiogram 12/2022 with LVEF 45%, no indication for ICD  -Repeat BMP.  If kidney function remains abnormal, consider referral to nephrology    Hypertension  Blood pressure controlled this visit  -Continue Entresto, metoprolol, and Aldactone as  above    Dyslipidemia  Statin intolerance  The patient reports that he has muscle pain on statin, resolved after stopping statin.  The patient request for nonstatin  -Continue ezetimibe 10 mg daily  -Consider referral to lipid clinic next visit    History of PAD status post iliac stent  Right leg arterial ultrasound obtained after the last visit without significant arterial occlusive disease  -Continue aspirin   -Continue ezetimibe as above    Shortness of breath  COPD  Initially thought secondary to volume retention, but did not tolerate a higher dose of diuretics.  - the patient to follow-up with pulmonology  -Continue nocturnal oxygen      All of the patient's excellent questions were answered to the best of my knowledge and to his satisfaction.  It was a pleasure seeing Mr. Jason Pearson in my clinic today. Return in about 3 months (around 3/1/2024). Patient is aware to call the cardiology clinic with any questions or concerns.      Hayes Mccall MD  [facility]  [facility], [address]  [address] [address]  Phone: [phone]  Fax: [phone]

## 2023-12-13 ENCOUNTER — HOSPITAL ENCOUNTER (OUTPATIENT)
Dept: LAB | Facility: MEDICAL CENTER | Age: 83
End: 2023-12-13
Attending: STUDENT IN AN ORGANIZED HEALTH CARE EDUCATION/TRAINING PROGRAM
Payer: COMMERCIAL

## 2023-12-13 DIAGNOSIS — I50.9 CHF (NYHA CLASS II, ACC/AHA STAGE C) (HCC): ICD-10-CM

## 2023-12-13 DIAGNOSIS — I50.20 HFREF (HEART FAILURE WITH REDUCED EJECTION FRACTION) (HCC): ICD-10-CM

## 2023-12-13 DIAGNOSIS — I42.8 NONISCHEMIC CARDIOMYOPATHY (HCC): ICD-10-CM

## 2023-12-13 DIAGNOSIS — I10 ESSENTIAL HYPERTENSION: ICD-10-CM

## 2023-12-13 PROCEDURE — 36415 COLL VENOUS BLD VENIPUNCTURE: CPT

## 2023-12-13 PROCEDURE — 80048 BASIC METABOLIC PNL TOTAL CA: CPT

## 2023-12-14 LAB
ANION GAP SERPL CALC-SCNC: 7 MMOL/L (ref 7–16)
BUN SERPL-MCNC: 16 MG/DL (ref 8–22)
CALCIUM SERPL-MCNC: 8.9 MG/DL (ref 8.5–10.5)
CHLORIDE SERPL-SCNC: 105 MMOL/L (ref 96–112)
CO2 SERPL-SCNC: 26 MMOL/L (ref 20–33)
CREAT SERPL-MCNC: 1.35 MG/DL (ref 0.5–1.4)
GFR SERPLBLD CREATININE-BSD FMLA CKD-EPI: 53 ML/MIN/1.73 M 2
GLUCOSE SERPL-MCNC: 96 MG/DL (ref 65–99)
POTASSIUM SERPL-SCNC: 5.2 MMOL/L (ref 3.6–5.5)
SODIUM SERPL-SCNC: 138 MMOL/L (ref 135–145)

## 2023-12-18 ENCOUNTER — OFFICE VISIT (OUTPATIENT)
Dept: SLEEP MEDICINE | Facility: MEDICAL CENTER | Age: 83
End: 2023-12-18
Attending: INTERNAL MEDICINE
Payer: COMMERCIAL

## 2023-12-18 ENCOUNTER — HOSPITAL ENCOUNTER (OUTPATIENT)
Dept: RADIOLOGY | Facility: MEDICAL CENTER | Age: 83
End: 2023-12-18
Attending: INTERNAL MEDICINE
Payer: COMMERCIAL

## 2023-12-18 VITALS
OXYGEN SATURATION: 96 % | DIASTOLIC BLOOD PRESSURE: 54 MMHG | WEIGHT: 250 LBS | HEART RATE: 63 BPM | SYSTOLIC BLOOD PRESSURE: 122 MMHG | BODY MASS INDEX: 32.08 KG/M2 | HEIGHT: 74 IN

## 2023-12-18 DIAGNOSIS — Z87.891 FORMER SMOKER: ICD-10-CM

## 2023-12-18 DIAGNOSIS — I50.22 CHRONIC SYSTOLIC HEART FAILURE (HCC): ICD-10-CM

## 2023-12-18 DIAGNOSIS — J44.9 CHRONIC OBSTRUCTIVE PULMONARY DISEASE, UNSPECIFIED COPD TYPE (HCC): ICD-10-CM

## 2023-12-18 DIAGNOSIS — R05.2 SUBACUTE COUGH: ICD-10-CM

## 2023-12-18 DIAGNOSIS — G47.34 NOCTURNAL HYPOXIA: ICD-10-CM

## 2023-12-18 PROCEDURE — 71046 X-RAY EXAM CHEST 2 VIEWS: CPT

## 2023-12-18 PROCEDURE — 99214 OFFICE O/P EST MOD 30 MIN: CPT | Performed by: INTERNAL MEDICINE

## 2023-12-18 PROCEDURE — 3078F DIAST BP <80 MM HG: CPT | Performed by: INTERNAL MEDICINE

## 2023-12-18 PROCEDURE — 3074F SYST BP LT 130 MM HG: CPT | Performed by: INTERNAL MEDICINE

## 2023-12-18 PROCEDURE — 99212 OFFICE O/P EST SF 10 MIN: CPT | Performed by: INTERNAL MEDICINE

## 2023-12-18 RX ORDER — ALBUTEROL SULFATE 90 UG/1
2 AEROSOL, METERED RESPIRATORY (INHALATION) EVERY 6 HOURS PRN
COMMUNITY
End: 2023-12-18 | Stop reason: SDUPTHER

## 2023-12-18 RX ORDER — IPRATROPIUM BROMIDE AND ALBUTEROL SULFATE 2.5; .5 MG/3ML; MG/3ML
3 SOLUTION RESPIRATORY (INHALATION) EVERY 4 HOURS PRN
Qty: 120 ML | Refills: 11 | Status: SHIPPED | OUTPATIENT
Start: 2023-12-18

## 2023-12-18 RX ORDER — ALBUTEROL SULFATE 90 UG/1
2 AEROSOL, METERED RESPIRATORY (INHALATION) EVERY 6 HOURS PRN
Qty: 8.5 G | Refills: 6 | Status: SHIPPED | OUTPATIENT
Start: 2023-12-18

## 2023-12-18 ASSESSMENT — ENCOUNTER SYMPTOMS
SHORTNESS OF BREATH: 0
WHEEZING: 0
HEADACHES: 0
ABDOMINAL PAIN: 0
ORTHOPNEA: 0
EYE REDNESS: 0
FOCAL WEAKNESS: 0
WEIGHT LOSS: 0
SINUS PAIN: 0
PHOTOPHOBIA: 0
HEARTBURN: 0
VOMITING: 0
SPEECH CHANGE: 0
NECK PAIN: 0
DOUBLE VISION: 0
STRIDOR: 0
BLURRED VISION: 0
EYE PAIN: 0
CONSTIPATION: 0
PND: 0
SPUTUM PRODUCTION: 0
WEAKNESS: 0
TREMORS: 0
COUGH: 1
NAUSEA: 0
MYALGIAS: 0
DIARRHEA: 0
DEPRESSION: 0
FALLS: 0
PALPITATIONS: 0
DIZZINESS: 0
DIAPHORESIS: 0
SORE THROAT: 0
CLAUDICATION: 0
HEMOPTYSIS: 0
CHILLS: 0
FEVER: 0
BACK PAIN: 0
EYE DISCHARGE: 0

## 2023-12-18 ASSESSMENT — PATIENT HEALTH QUESTIONNAIRE - PHQ9: CLINICAL INTERPRETATION OF PHQ2 SCORE: 0

## 2023-12-18 ASSESSMENT — FIBROSIS 4 INDEX: FIB4 SCORE: 1.85

## 2023-12-18 NOTE — PATIENT INSTRUCTIONS
Nebulizer treatment with duonebs one to two times daily for airway clearance, can take mucinex (guaifenesin) 400mg -600mg  before hand to help thin mucous     [facility]

## 2023-12-18 NOTE — PROGRESS NOTES
Chief Complaint   Patient presents with    Follow-Up     LAST SEEN 8/8/22         HPI: This patient is a 83 y.o. male whom is followed in our clinic for COPD c/b respiratory failure on O2 at night only last seen by me on 8/8/22.  PMHx is significant for HTN, HFrEF and PVD. He is a former smoker with roughly 50-pack-year history and quit in 2011.  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 No exacerbations and sxs have remained controlled on anoro only. Pulmonary function testing from 2015 showed FEV1 of 2.22 L or 60% predicted with FEV1/FVC ratio of 66.  There was significant bronchodilator response, normal total lung capacity 95% predicted with mild air trapping and elevated DLCO 136% predicted.  Updated PFTs from 7/2021 showed FEV1 of 1.93 L or 58% predicted with normal lung volumes and normal DLCO, not significantly different from 2015. Since our last visit he has developed new cough. He had stopped his anoro but resumed this w/only minimal improvement in cough. He is on entresto which is new since our last visit and 9% of pts experience cough with this. Per wife he does have signs of chest congestion with difficulty clearing mucous. No f/c. No chest pain. No recent/updated imaging since onset of cough.    Past Medical History:   Diagnosis Date    Arthritis     Bowel habit changes     constipation     Breath shortness     chronic, uses 2L o2 at night ([facility])     Bronchitis 1960    Chest pain 4/5/2012    Coronary angiogram showed no evidence of CAD.    Chronic obstructive pulmonary disease (HCC)     Disorder of thyroid     hypothyroid     EMPHYSEMA     Heart burn     Hemorrhagic disorder (HCC)     bleeds easily     High cholesterol     Hypertension     Indigestion     Left bundle branch block     Pain     Right calf pain, started about 3 weeks ago. pt denies swelling.    Pneumonia 2004    PVD (peripheral vascular disease) (HCC)     Tuberculosis 1953    received  treatment        Social History     Socioeconomic History    Marital status: Single     Spouse name: Not on file    Number of children: Not on file    Years of education: Not on file    Highest education level: Not on file   Occupational History    Not on file   Tobacco Use    Smoking status: Former     Current packs/day: 0.00     Average packs/day: 1 pack/day for 50.0 years (50.0 ttl pk-yrs)     Types: Cigarettes     Start date: 10/4/1961     Quit date: 10/4/2011     Years since quittin.2    Smokeless tobacco: Never   Vaping Use    Vaping Use: Never used   Substance and Sexual Activity    Alcohol use: No    Drug use: Never    Sexual activity: Not on file   Other Topics Concern    Not on file   Social History Narrative    Not on file     Social Determinants of Health     Financial Resource Strain: Not on file   Food Insecurity: Not on file   Transportation Needs: Not on file   Physical Activity: Not on file   Stress: Not on file   Social Connections: Not on file   Intimate Partner Violence: Not on file   Housing Stability: Not on file       Family History   Problem Relation Age of Onset    Heart Disease Mother     Heart Disease Father        Current Outpatient Medications on File Prior to Visit   Medication Sig Dispense Refill    umeclidinium-vilanterol (ANORO ELLIPTA) 62.5-25 MCG/INH AEROSOL POWDER, BREATH ACTIVATED inhaler Inhale 1 Puff every day. 3 Each 3    spironolactone (ALDACTONE) 25 MG Tab Take 0.5 Tablets by mouth every day. 90 Tablet 3    ezetimibe (ZETIA) 10 MG Tab Take 1 Tablet by mouth every day. 90 Tablet 3    sacubitril-valsartan (ENTRESTO) 49-51 MG Tab Take 1 Tablet by mouth 2 times a day. 180 Tablet 1    metoprolol SR (TOPROL XL) 50 MG TABLET SR 24 HR Take 1.5 Tablets by mouth every day. 135 Tablet 1    amoxicillin (AMOXIL) 500 MG Cap Take 2,000 mg by mouth as needed. TAKE 4 CAPSULES BY MOUTH 1 HOUR PRIOR TO DENTAL TREATMENT      DENTA 5000 PLUS 1.1 % Cream Take 1 Application by mouth every  "evening. USE AS DIRECTED      Polyvinyl Alcohol-Povidone (REFRESH OP) Administer 2 Drops into both eyes 2 times a day.      clonazePAM (KLONOPIN) 0.5 MG Tab Take 1 mg by mouth at bedtime as needed.      levothyroxine (SYNTHROID) 88 MCG Tab Take 88 mcg by mouth every morning on an empty stomach.      Home Care Oxygen Inhale at bedtime. At night      Cholecalciferol (VITAMIN D3) 2000 UNIT TABS Take 1 Tablet by mouth every morning.      aspirin 81 MG EC tablet Take 81 mg by mouth at bedtime.       No current facility-administered medications on file prior to visit.       Atorvastatin, Cat hair extract, and Iodine      ROS:   Review of Systems   Constitutional:  Negative for chills, diaphoresis, fever, malaise/fatigue and weight loss.   HENT:  Negative for congestion, ear discharge, ear pain, hearing loss, nosebleeds, sinus pain, sore throat and tinnitus.    Eyes:  Negative for blurred vision, double vision, photophobia, pain, discharge and redness.   Respiratory:  Positive for cough. Negative for hemoptysis, sputum production, shortness of breath, wheezing and stridor.    Cardiovascular:  Negative for chest pain, palpitations, orthopnea, claudication, leg swelling and PND.   Gastrointestinal:  Negative for abdominal pain, constipation, diarrhea, heartburn, nausea and vomiting.   Genitourinary:  Negative for dysuria and urgency.   Musculoskeletal:  Negative for back pain, falls, joint pain, myalgias and neck pain.   Skin:  Negative for itching and rash.   Neurological:  Negative for dizziness, tremors, speech change, focal weakness, weakness and headaches.   Endo/Heme/Allergies:  Negative for environmental allergies.   Psychiatric/Behavioral:  Negative for depression.        /54 (BP Location: Right arm, Patient Position: Sitting, BP Cuff Size: Adult)   Pulse 63   Ht 1.867 m (6' 1.5\")   Wt 113 kg (250 lb)   SpO2 96%   Physical Exam  Vitals reviewed.   Constitutional:       General: He is not in acute " distress.     Appearance: Normal appearance. He is normal weight.   HENT:      Head: Normocephalic and atraumatic.      Right Ear: External ear normal.      Left Ear: External ear normal.      Nose: Nose normal. No congestion.      Mouth/Throat:      Mouth: Mucous membranes are moist.      Pharynx: Oropharynx is clear. No oropharyngeal exudate.   Eyes:      General: No scleral icterus.     Extraocular Movements: Extraocular movements intact.      Conjunctiva/sclera: Conjunctivae normal.      Pupils: Pupils are equal, round, and reactive to light.   Cardiovascular:      Rate and Rhythm: Normal rate and regular rhythm.      Heart sounds: Normal heart sounds. No murmur heard.     No gallop.   Pulmonary:      Effort: Pulmonary effort is normal. No respiratory distress.      Breath sounds: Normal breath sounds. No wheezing or rales.   Abdominal:      General: There is no distension.      Palpations: Abdomen is soft.   Musculoskeletal:         General: Normal range of motion.      Cervical back: Normal range of motion and neck supple.      Right lower leg: No edema.      Left lower leg: No edema.   Skin:     General: Skin is warm and dry.   Neurological:      Mental Status: He is alert and oriented to person, place, and time.      Cranial Nerves: No cranial nerve deficit.   Psychiatric:         Mood and Affect: Mood normal.         Behavior: Behavior normal.       PFTs as reviewed by me personally: NA    Imaging as reviewed by me personally:  as per hPI    Assessment:  1. Chronic obstructive pulmonary disease, unspecified COPD type (HCC)  albuterol 108 (90 Base) MCG/ACT Aero Soln inhalation aerosol    ipratropium-albuterol (DUONEB) 0.5-2.5 (3) MG/3ML nebulizer solution      2. Chronic systolic heart failure (HCC)        3. Former smoker        4. Nocturnal hypoxia  DME Other      5. Subacute cough  DME Nebulizer    DX-CHEST-2 VIEWS          Plan:  Chronic, has been mild but now with new/worsening cough. Obtain CXR, start  airway clearance regimen with nebulized broncohodilators. We can consider adding ICS. May be Rx related.  Followed by cardiology; appears stable/compensated today  Tobacco free.  Compliant with and benefiting from O2 at night  See discussion above.   Return in about 6 months (around 6/18/2024) for CXR.

## 2024-01-24 ENCOUNTER — HOSPITAL ENCOUNTER (OUTPATIENT)
Dept: LAB | Facility: MEDICAL CENTER | Age: 84
End: 2024-01-24
Attending: STUDENT IN AN ORGANIZED HEALTH CARE EDUCATION/TRAINING PROGRAM
Payer: COMMERCIAL

## 2024-01-24 LAB
ALBUMIN SERPL BCP-MCNC: 3.8 G/DL (ref 3.2–4.9)
ALBUMIN/GLOB SERPL: 1.2 G/DL
ALP SERPL-CCNC: 78 U/L (ref 30–99)
ALT SERPL-CCNC: 14 U/L (ref 2–50)
ANION GAP SERPL CALC-SCNC: 9 MMOL/L (ref 7–16)
AST SERPL-CCNC: 13 U/L (ref 12–45)
BASOPHILS # BLD AUTO: 0.9 % (ref 0–1.8)
BASOPHILS # BLD: 0.07 K/UL (ref 0–0.12)
BILIRUB SERPL-MCNC: 0.5 MG/DL (ref 0.1–1.5)
BUN SERPL-MCNC: 16 MG/DL (ref 8–22)
CALCIUM ALBUM COR SERPL-MCNC: 9.2 MG/DL (ref 8.5–10.5)
CALCIUM SERPL-MCNC: 9 MG/DL (ref 8.5–10.5)
CHLORIDE SERPL-SCNC: 104 MMOL/L (ref 96–112)
CHOLEST SERPL-MCNC: 166 MG/DL (ref 100–199)
CO2 SERPL-SCNC: 25 MMOL/L (ref 20–33)
CREAT SERPL-MCNC: 1.38 MG/DL (ref 0.5–1.4)
EOSINOPHIL # BLD AUTO: 0.18 K/UL (ref 0–0.51)
EOSINOPHIL NFR BLD: 2.3 % (ref 0–6.9)
ERYTHROCYTE [DISTWIDTH] IN BLOOD BY AUTOMATED COUNT: 47.4 FL (ref 35.9–50)
FASTING STATUS PATIENT QL REPORTED: NORMAL
GFR SERPLBLD CREATININE-BSD FMLA CKD-EPI: 51 ML/MIN/1.73 M 2
GLOBULIN SER CALC-MCNC: 3.1 G/DL (ref 1.9–3.5)
GLUCOSE SERPL-MCNC: 99 MG/DL (ref 65–99)
HCT VFR BLD AUTO: 57.1 % (ref 42–52)
HDLC SERPL-MCNC: 38 MG/DL
HGB BLD-MCNC: 18.5 G/DL (ref 14–18)
IMM GRANULOCYTES # BLD AUTO: 0.01 K/UL (ref 0–0.11)
IMM GRANULOCYTES NFR BLD AUTO: 0.1 % (ref 0–0.9)
LDLC SERPL CALC-MCNC: 102 MG/DL
LYMPHOCYTES # BLD AUTO: 2.48 K/UL (ref 1–4.8)
LYMPHOCYTES NFR BLD: 32.3 % (ref 22–41)
MCH RBC QN AUTO: 30.9 PG (ref 27–33)
MCHC RBC AUTO-ENTMCNC: 32.4 G/DL (ref 32.3–36.5)
MCV RBC AUTO: 95.3 FL (ref 81.4–97.8)
MONOCYTES # BLD AUTO: 0.89 K/UL (ref 0–0.85)
MONOCYTES NFR BLD AUTO: 11.6 % (ref 0–13.4)
NEUTROPHILS # BLD AUTO: 4.05 K/UL (ref 1.82–7.42)
NEUTROPHILS NFR BLD: 52.8 % (ref 44–72)
NRBC # BLD AUTO: 0 K/UL
NRBC BLD-RTO: 0 /100 WBC (ref 0–0.2)
PLATELET # BLD AUTO: 244 K/UL (ref 164–446)
PMV BLD AUTO: 9.7 FL (ref 9–12.9)
POTASSIUM SERPL-SCNC: 5 MMOL/L (ref 3.6–5.5)
PROT SERPL-MCNC: 6.9 G/DL (ref 6–8.2)
RBC # BLD AUTO: 5.99 M/UL (ref 4.7–6.1)
SODIUM SERPL-SCNC: 138 MMOL/L (ref 135–145)
T3FREE SERPL-MCNC: 2.72 PG/ML (ref 2–4.4)
T4 FREE SERPL-MCNC: 1.4 NG/DL (ref 0.93–1.7)
TRIGL SERPL-MCNC: 132 MG/DL (ref 0–149)
TSH SERPL DL<=0.005 MIU/L-ACNC: 0.6 UIU/ML (ref 0.38–5.33)
WBC # BLD AUTO: 7.7 K/UL (ref 4.8–10.8)

## 2024-01-24 PROCEDURE — 80061 LIPID PANEL: CPT

## 2024-01-24 PROCEDURE — 84270 ASSAY OF SEX HORMONE GLOBUL: CPT

## 2024-01-24 PROCEDURE — 85025 COMPLETE CBC W/AUTO DIFF WBC: CPT

## 2024-01-24 PROCEDURE — 36415 COLL VENOUS BLD VENIPUNCTURE: CPT

## 2024-01-24 PROCEDURE — 84481 FREE ASSAY (FT-3): CPT

## 2024-01-24 PROCEDURE — 84402 ASSAY OF FREE TESTOSTERONE: CPT

## 2024-01-24 PROCEDURE — 84403 ASSAY OF TOTAL TESTOSTERONE: CPT

## 2024-01-24 PROCEDURE — 84439 ASSAY OF FREE THYROXINE: CPT

## 2024-01-24 PROCEDURE — 84443 ASSAY THYROID STIM HORMONE: CPT

## 2024-01-24 PROCEDURE — 80053 COMPREHEN METABOLIC PANEL: CPT

## 2024-01-26 LAB
SHBG SERPL-SCNC: 39 NMOL/L (ref 19–76)
TESTOST FREE MFR SERPL: 1.9 % (ref 1.6–2.9)
TESTOST FREE SERPL-MCNC: 143 PG/ML (ref 47–244)
TESTOST SERPL-MCNC: 771 NG/DL (ref 300–720)

## 2024-01-29 DIAGNOSIS — I50.20 HFREF (HEART FAILURE WITH REDUCED EJECTION FRACTION) (HCC): ICD-10-CM

## 2024-01-29 DIAGNOSIS — I42.8 NONISCHEMIC CARDIOMYOPATHY (HCC): ICD-10-CM

## 2024-01-29 DIAGNOSIS — I50.9 CHF (NYHA CLASS II, ACC/AHA STAGE C) (HCC): ICD-10-CM

## 2024-01-29 DIAGNOSIS — I10 ESSENTIAL HYPERTENSION: ICD-10-CM

## 2024-01-29 RX ORDER — SACUBITRIL AND VALSARTAN 49; 51 MG/1; MG/1
1 TABLET, FILM COATED ORAL 2 TIMES DAILY
Qty: 180 TABLET | Refills: 1 | Status: SHIPPED | OUTPATIENT
Start: 2024-01-29

## 2024-01-29 RX ORDER — METOPROLOL SUCCINATE 50 MG/1
75 TABLET, EXTENDED RELEASE ORAL DAILY
Qty: 135 TABLET | Refills: 3 | Status: SHIPPED | OUTPATIENT
Start: 2024-01-29

## 2024-01-29 RX ORDER — SPIRONOLACTONE 25 MG/1
12.5 TABLET ORAL DAILY
Qty: 45 TABLET | Refills: 3 | Status: SHIPPED | OUTPATIENT
Start: 2024-01-29

## 2024-01-29 NOTE — TELEPHONE ENCOUNTER
Is the patient due for a refill? Yes    Was the patient seen the past year? Yes    Date of last office visit: 12/01/2023    Does the patient have an upcoming appointment?  Yes   If yes, When? 03/18/2024    Provider to refill:MAYELIN    Does the patients insurance require a 100 day supply?  No

## 2024-03-20 ENCOUNTER — OFFICE VISIT (OUTPATIENT)
Dept: CARDIOLOGY | Facility: MEDICAL CENTER | Age: 84
End: 2024-03-20
Attending: NURSE PRACTITIONER
Payer: COMMERCIAL

## 2024-03-20 VITALS
RESPIRATION RATE: 15 BRPM | OXYGEN SATURATION: 94 % | DIASTOLIC BLOOD PRESSURE: 70 MMHG | HEART RATE: 68 BPM | WEIGHT: 253 LBS | BODY MASS INDEX: 33.53 KG/M2 | HEIGHT: 73 IN | SYSTOLIC BLOOD PRESSURE: 130 MMHG

## 2024-03-20 DIAGNOSIS — I50.9 CHF (NYHA CLASS II, ACC/AHA STAGE C) (HCC): ICD-10-CM

## 2024-03-20 DIAGNOSIS — I10 ESSENTIAL HYPERTENSION: ICD-10-CM

## 2024-03-20 DIAGNOSIS — I42.8 NONISCHEMIC CARDIOMYOPATHY (HCC): ICD-10-CM

## 2024-03-20 DIAGNOSIS — E78.5 DYSLIPIDEMIA: ICD-10-CM

## 2024-03-20 DIAGNOSIS — I73.9 PAD (PERIPHERAL ARTERY DISEASE) (HCC): ICD-10-CM

## 2024-03-20 DIAGNOSIS — I50.20 HFREF (HEART FAILURE WITH REDUCED EJECTION FRACTION) (HCC): ICD-10-CM

## 2024-03-20 PROCEDURE — 3078F DIAST BP <80 MM HG: CPT | Performed by: NURSE PRACTITIONER

## 2024-03-20 PROCEDURE — 99213 OFFICE O/P EST LOW 20 MIN: CPT | Performed by: NURSE PRACTITIONER

## 2024-03-20 PROCEDURE — 3075F SYST BP GE 130 - 139MM HG: CPT | Performed by: NURSE PRACTITIONER

## 2024-03-20 PROCEDURE — 99214 OFFICE O/P EST MOD 30 MIN: CPT | Performed by: NURSE PRACTITIONER

## 2024-03-20 RX ORDER — ROSUVASTATIN CALCIUM 5 MG/1
5 TABLET, COATED ORAL EVERY EVENING
Qty: 30 TABLET | Refills: 11 | Status: SHIPPED | OUTPATIENT
Start: 2024-03-20

## 2024-03-20 ASSESSMENT — ENCOUNTER SYMPTOMS
HEADACHES: 1
WEAKNESS: 1
PND: 0
DIZZINESS: 0
MYALGIAS: 0
SHORTNESS OF BREATH: 1
ABDOMINAL PAIN: 0
FEVER: 0
CLAUDICATION: 0
COUGH: 0
ORTHOPNEA: 0
PALPITATIONS: 0

## 2024-03-20 ASSESSMENT — FIBROSIS 4 INDEX: FIB4 SCORE: 1.15

## 2024-03-20 NOTE — PATIENT INSTRUCTIONS
Start rosuvastatin 2 x per week, if tolerating increase to 3 times per week, then daily. Notify office if not better

## 2024-03-20 NOTE — PROGRESS NOTES
Chief Complaint   Patient presents with    Congestive Heart Failure     F/V DX: HFrEF (heart failure with reduced ejection fraction) (Formerly [facility])            Subjective     Jason Pearson is a 83 y.o. male who presents today for follow-up on his heart failure, hypertension.    Patient of Dr. Mccall.  He was last seen in clinic on 12/1/2023 with Dr. Mccall.  During that visit, he was sent for follow-up lab testing.    Patient does continue to have shortness of breath with exertion.  He also mentions he had a recent upper respiratory infection and had a bad cough and is now recovering from it.  He mentions he has had a dull headache in the morning as well.    Patient is not able to exercise and is limited in his mobility due to prior orthopedic surgeries and leg weakness.    Patient denies chest pain, palpitations, orthopnea, PND, edema or dizziness/lightheadedness.    He does not drink enough water.  He also admits that he eats out a lot.    Patient is not weighing himself at home.    Additionally, patient has the following medical problems:    -Minimal CAD in 2021    -Former smoker, quit 2011    -DLD    -PAD, s/p iliac stent    Past Medical History:   Diagnosis Date    Arthritis     Bowel habit changes     constipation     Breath shortness     chronic, uses 2L o2 at night ([facility])     Bronchitis 1960    Chest pain 4/5/2012    Coronary angiogram showed no evidence of CAD.    Chronic obstructive pulmonary disease (HCC)     Disorder of thyroid     hypothyroid     EMPHYSEMA     Heart burn     Hemorrhagic disorder (Formerly [facility])     bleeds easily     High cholesterol     Hypertension     Indigestion     Left bundle branch block     Pain     Right calf pain, started about 3 weeks ago. pt denies swelling.    Pneumonia 2004    PVD (peripheral vascular disease) (Formerly [facility])     Tuberculosis 1953    received treatment      Past Surgical History:   Procedure Laterality Date    STENT PLACEMENT  08/1994    bilateral iliac stent placement.     FOOT SURGERY      OTHER      finger surgery     OTHER      hemorrhoiectomy x2 in 70s or 80s    SINUSOTOMIES      SPINAL FORAMINOTOMY       Family History   Problem Relation Age of Onset    Heart Disease Mother     Heart Disease Father      Social History     Socioeconomic History    Marital status: Single     Spouse name: Not on file    Number of children: Not on file    Years of education: Not on file    Highest education level: Not on file   Occupational History    Not on file   Tobacco Use    Smoking status: Former     Current packs/day: 0.00     Average packs/day: 1 pack/day for 50.0 years (50.0 ttl pk-yrs)     Types: Cigarettes     Start date: 10/4/1961     Quit date: 10/4/2011     Years since quittin.4    Smokeless tobacco: Never   Vaping Use    Vaping Use: Never used   Substance and Sexual Activity    Alcohol use: No    Drug use: Never    Sexual activity: Not on file   Other Topics Concern    Not on file   Social History Narrative    Not on file     Social Determinants of Health     Financial Resource Strain: Not on file   Food Insecurity: Not on file   Transportation Needs: Not on file   Physical Activity: Not on file   Stress: Not on file   Social Connections: Not on file   Intimate Partner Violence: Not on file   Housing Stability: Not on file     Allergies   Allergen Reactions    Atorvastatin     Cat Hair Extract Itching    Iodine      Vomiting      Outpatient Encounter Medications as of 3/20/2024   Medication Sig Dispense Refill    rosuvastatin (CRESTOR) 5 MG Tab Take 1 Tablet by mouth every evening. 30 Tablet 11    spironolactone (ALDACTONE) 25 MG Tab Take 0.5 Tablets by mouth every day. 45 Tablet 3    sacubitril-valsartan (ENTRESTO) 49-51 MG Tab Take 1 Tablet by mouth 2 times a day. 180 Tablet 1    metoprolol SR (TOPROL XL) 50 MG TABLET SR 24 HR Take 1.5 Tablets by mouth every day. 135 Tablet 3    albuterol 108 (90 Base) MCG/ACT Aero Soln inhalation aerosol Inhale 2 Puffs every 6 hours as needed  "for Shortness of Breath. 8.5 g 6    ipratropium-albuterol (DUONEB) 0.5-2.5 (3) MG/3ML nebulizer solution Take 3 mL by nebulization every four hours as needed for Shortness of Breath. 120 mL 11    ezetimibe (ZETIA) 10 MG Tab Take 1 Tablet by mouth every day. 90 Tablet 3    amoxicillin (AMOXIL) 500 MG Cap Take 2,000 mg by mouth as needed. TAKE 4 CAPSULES BY MOUTH 1 HOUR PRIOR TO DENTAL TREATMENT      umeclidinium-vilanterol (ANORO ELLIPTA) 62.5-25 MCG/INH AEROSOL POWDER, BREATH ACTIVATED inhaler Inhale 1 Puff every day. 3 Each 3    DENTA 5000 PLUS 1.1 % Cream Take 1 Application by mouth every evening. USE AS DIRECTED      Polyvinyl Alcohol-Povidone (REFRESH OP) Administer 2 Drops into both eyes 2 times a day.      clonazePAM (KLONOPIN) 0.5 MG Tab Take 1 mg by mouth at bedtime as needed.      levothyroxine (SYNTHROID) 88 MCG Tab Take 88 mcg by mouth every morning on an empty stomach.      Home Care Oxygen Inhale at bedtime. At night      Cholecalciferol (VITAMIN D3) 2000 UNIT TABS Take 1 Tablet by mouth every morning.      aspirin 81 MG EC tablet Take 81 mg by mouth at bedtime.       No facility-administered encounter medications on file as of 3/20/2024.     Review of Systems   Constitutional:  Positive for malaise/fatigue. Negative for fever.   Respiratory:  Positive for shortness of breath. Negative for cough.    Cardiovascular:  Negative for chest pain, palpitations, orthopnea, claudication, leg swelling and PND.   Gastrointestinal:  Negative for abdominal pain.   Musculoskeletal:  Negative for myalgias.   Neurological:  Positive for weakness and headaches. Negative for dizziness.   All other systems reviewed and are negative.             Objective     /70 (BP Location: Left arm, Patient Position: Sitting, BP Cuff Size: Adult)   Pulse 68   Resp 15   Ht 1.854 m (6' 1\")   Wt 115 kg (253 lb)   SpO2 94%   BMI 33.38 kg/m²     Physical Exam  Vitals reviewed.   Constitutional:       Appearance: He is " well-developed.   HENT:      Head: Normocephalic and atraumatic.   Eyes:      Pupils: Pupils are equal, round, and reactive to light.   Neck:      Vascular: No JVD.   Cardiovascular:      Rate and Rhythm: Normal rate and regular rhythm.      Heart sounds: Normal heart sounds.   Pulmonary:      Effort: Pulmonary effort is normal. No respiratory distress.      Breath sounds: Normal breath sounds. No wheezing or rales.   Abdominal:      General: Bowel sounds are normal.      Palpations: Abdomen is soft.   Musculoskeletal:         General: Normal range of motion.      Cervical back: Normal range of motion and neck supple.      Right lower leg: No edema.      Left lower leg: No edema.   Skin:     General: Skin is warm and dry.   Neurological:      General: No focal deficit present.      Mental Status: He is alert and oriented to person, place, and time.   Psychiatric:         Behavior: Behavior normal.            Lab Results   Component Value Date/Time    CHOLSTRLTOT 166 01/24/2024 10:51 AM     (H) 01/24/2024 10:51 AM    HDL 38 (A) 01/24/2024 10:51 AM    TRIGLYCERIDE 132 01/24/2024 10:51 AM       Lab Results   Component Value Date/Time    SODIUM 138 01/24/2024 10:51 AM    POTASSIUM 5.0 01/24/2024 10:51 AM    CHLORIDE 104 01/24/2024 10:51 AM    CO2 25 01/24/2024 10:51 AM    GLUCOSE 99 01/24/2024 10:51 AM    BUN 16 01/24/2024 10:51 AM    CREATININE 1.38 01/24/2024 10:51 AM     Lab Results   Component Value Date/Time    ALKPHOSPHAT 78 01/24/2024 10:51 AM    ASTSGOT 13 01/24/2024 10:51 AM    ALTSGPT 14 01/24/2024 10:51 AM    TBILIRUBIN 0.5 01/24/2024 10:51 AM         Myocardial Perfusion Report 6/22/2021   NUCLEAR IMAGING INTERPRETATION   Small nonreversible defect in the apical septal (LAD) region.     No reversible ischemia.    Global hypokinesis with LVEF 33% (however, LVEF is better assessed via    echocardiogram).      Transthoracic Echo Report 8/10/2021  Mildly reduced left ventricular systolic function. Left  ventricular   ejection fraction is visually estimated to be 45%.   Grade I diastolic dysfunction.  Dyskinesis of the interventricular septum, consistent with left bundle   branch block.   Normal right ventricular size and systolic function.  No significant valvular abnormalities.   No prior study is available for comparison.     Cardiac catheterization 8/17/2021  HEMODYNAMICS:   Aortic pressure: 147/66 mmHg  Pre A-wave pressure: 15 mmHg  No significant aortic gradient on pullback     CORONARY ANGIOGRAPHY:  The left main coronary artery is a short, patent vessel that bifurcates into the left anterior descending and left circumflex coronary arteries.  The left anterior descending coronary artery a large, transapical vessel that supplies a moderate first diagonal branch, a moderate second diagonal branch, and a large third diagonal branch and has minimal luminal irregularities in the distribution.  The left circumflex coronary artery is a large, nondominant vessel that supplies a small first obtuse marginal branch, a moderate second obtuse marginal branch, and a large third obtuse marginal branch and has minimal luminal irregularities in the distribution.  The right coronary artery is a large, dominant vessel that supplies a large posterior descending artery and a large posterolateral system and has minimal luminal irregularities in the distribution.     IMPRESSION:  Minimal luminal coronary artery disease  Borderline elevated left heart filling pressures     RECOMMENDATIONS:  Recover in post procedure unit  TR band release per protocol  Continue optimal medical therapy for nonischemic cardiomyopathy  Aggressive cardiac risk factor management    Transthoracic Echo Report 5/16/2022  The left ventricle is mildly dilated.  The left ventricular ejection fraction is visually estimated to be 35%.  There is abnormal septal motion consistent with underlying conduction delay.  Grade I diastolic dysfunction.  The right ventricle  is normal in size and systolic function.  Unable to estimate pulmonary artery pressure due to an inadequate   tricuspid regurgitant jet.  The left atrium is normal in size.  No significant valvular abnormalities.  Probably normal inferior vena cava size and inspiratory collapse.     Transthoracic Echo Report 8/12/2022  Moderately reduced left ventricular systolic function. The left   ventricular ejection fraction is visually estimated to be 35%. Global   hypokinesis.   Grade I diastolic dysfunction.  The right ventricle is normal in size and systolic function.   Mild mitral regurgitation.  Compared to the prior echo on 5/16/22, there are no significant   changes.     Transthoracic Echo Report 12/16/2022  Compared to the images of the prior study 8/12/22, there has been   improvement in the estimated ejection fraction, previously 35%.  Mildly reduced left ventricular systolic function.  The left ventricular ejection fraction is visually estimated to be 45%.  Global hypokinesis with regional variation.     Assessment & Plan     1. PAD (peripheral artery disease) (formerly [facility])  rosuvastatin (CRESTOR) 5 MG Tab    Comp Metabolic Panel    Lipid Profile      2. Dyslipidemia  rosuvastatin (CRESTOR) 5 MG Tab    Comp Metabolic Panel    Lipid Profile      3. HFrEF (heart failure with reduced ejection fraction) (formerly [facility])        4. Nonischemic cardiomyopathy (formerly [facility])        5. CHF (NYHA class II, ACC/AHA stage C) (formerly [facility])        6. Essential hypertension                Medical Decision Making: Today's Assessment/Status/Plan:          HFmrEF, Stage C, Class 1-2, LVEF 45%: Based on physical examination findings, patient is euvolemic. No JVD, lungs are clear to auscultation, no pitting edema in bilateral lower extremities, no ascites.  -Heart failure due to nonischemic cardiomyopathy  -ACE-I/ARB/ARNI: Continue Entresto 49-51 mg twice a day  -Evidence Based Beta-blocker: Continue metoprolol SR 75 mg daily  -Aldosterone Antagonist: Continue  spironolactone 12.5 mg daily  -Diuretic: At this time, patient to try taking furosemide 20 mg daily as needed  -SGLT2 inhibitor: Previously discussed starting SGLT2 inhibitor, patient declines  -Labs: CMP and lipid panel due in 6 months, recent labs reviewed with patient  -No indication for ICD is recent LVEF at 45%  -Reinforced s/sx of worsening heart failure with patient and weight monitoring. Pt verbalizes understanding. Pt to call office or RTC if present.    -Start monitoring weights at home daily. Call office if weight increasing greater than 3 lbs in 1 day or greater than 5 lbs in 1 week.    -PUMP line number [phone] (PUMP) reviewed with patient  -Heart Failure Education: Education reinforced, discussed eating out can have increased Sodium.  Discussed finding options to eat at home and lower sodium intake.  -Advanced care planning: Advanced directive and POLST to be discussed at future visit     Hypertension: Stable  -Recommendations per above  -Patient encouraged to check blood pressure morning to see if that is causing his dull headaches.  Also encouraged hydration    Dyslipidemia:  -Last  on 1/24/2024  -He had intolerance with intolerance with atorvastatin causing muscle aches/joint pain.  -He reports no problems with ezetimibe 10 mg daily, he will continue  -Would like to continue to optimize him and try low-dose rosuvastatin, he is agreeable.  -Patient to start rosuvastatin 5 mg twice a week and if tolerates he will increase to 3 times a week and if he tolerates that then he will increase to daily.  Recommend patient to notify our office if he continues to have symptoms at low-dose of rosuvastatin, then I will refer over to lipid clinic  -Continue aspirin 81 mg daily  -CMP and lipid panel in 6 months    PAD, s/p iliac stent:  -ERIBERTO from 8/20/2021 showed mild to moderate arterial disease on right side, no evidence on left side.  -Continue recommendations per above    FU in clinic in 6 months with  Dr. Mccall. Sooner if needed.    Patient verbalizes understanding and agrees with the plan of care.     PLEASE NOTE: This Note was created using voice recognition Software. I have made every reasonable attempt to correct obvious errors, but I expect that there are errors of grammar and possibly content that I did not discover before finalizing the note

## 2024-04-07 DIAGNOSIS — J44.9 CHRONIC OBSTRUCTIVE PULMONARY DISEASE, UNSPECIFIED COPD TYPE (HCC): ICD-10-CM

## 2024-04-08 DIAGNOSIS — E78.5 DYSLIPIDEMIA: ICD-10-CM

## 2024-04-08 DIAGNOSIS — I73.9 PAD (PERIPHERAL ARTERY DISEASE) (HCC): ICD-10-CM

## 2024-04-08 RX ORDER — ROSUVASTATIN CALCIUM 5 MG/1
5 TABLET, COATED ORAL EVERY EVENING
Qty: 90 TABLET | Refills: 3 | Status: SHIPPED | OUTPATIENT
Start: 2024-04-08

## 2024-04-08 RX ORDER — ALBUTEROL SULFATE 90 UG/1
2 AEROSOL, METERED RESPIRATORY (INHALATION) EVERY 6 HOURS PRN
Qty: 8.5 G | Refills: 6 | Status: SHIPPED | OUTPATIENT
Start: 2024-04-08

## 2024-04-08 NOTE — TELEPHONE ENCOUNTER
Is the patient due for a refill? PHARMACY CHANGE    Was the patient seen the past year? Yes    Date of last office visit: 03/20/2024    Does the patient have an upcoming appointment?  Yes   If yes, When? 09/23/2024    Provider to refill:MAYELIN    Does the patients insurance require a 100 day supply?  No

## 2024-05-28 ENCOUNTER — TELEPHONE (OUTPATIENT)
Dept: SLEEP MEDICINE | Facility: MEDICAL CENTER | Age: 84
End: 2024-05-28
Payer: COMMERCIAL

## 2024-05-28 DIAGNOSIS — J44.9 CHRONIC OBSTRUCTIVE PULMONARY DISEASE, UNSPECIFIED COPD TYPE (HCC): ICD-10-CM

## 2024-05-28 RX ORDER — UMECLIDINIUM BROMIDE AND VILANTEROL TRIFENATATE 62.5; 25 UG/1; UG/1
1 POWDER RESPIRATORY (INHALATION) DAILY
Qty: 3 EACH | Refills: 3 | Status: SHIPPED | OUTPATIENT
Start: 2024-05-28

## 2024-05-28 NOTE — TELEPHONE ENCOUNTER
Have we ever prescribed this med? Yes.  If yes, what date? 08/08/2022    Last OV: 12/18/2023    Next OV: 06/19/2024    Medications: umeclidinium-vilanterol (ANORO ELLIPTA) 62.5-25 MCG/INH AEROSOL POWDER, BREATH ACTIVATED inhaler

## 2024-06-19 ENCOUNTER — OFFICE VISIT (OUTPATIENT)
Dept: SLEEP MEDICINE | Facility: MEDICAL CENTER | Age: 84
End: 2024-06-19
Attending: INTERNAL MEDICINE
Payer: COMMERCIAL

## 2024-06-19 VITALS
OXYGEN SATURATION: 95 % | SYSTOLIC BLOOD PRESSURE: 124 MMHG | WEIGHT: 254 LBS | HEIGHT: 73 IN | BODY MASS INDEX: 33.66 KG/M2 | HEART RATE: 66 BPM | DIASTOLIC BLOOD PRESSURE: 56 MMHG

## 2024-06-19 DIAGNOSIS — Z87.891 FORMER SMOKER: ICD-10-CM

## 2024-06-19 DIAGNOSIS — I50.22 CHRONIC SYSTOLIC HEART FAILURE (HCC): ICD-10-CM

## 2024-06-19 DIAGNOSIS — J44.9 CHRONIC OBSTRUCTIVE PULMONARY DISEASE, UNSPECIFIED COPD TYPE (HCC): ICD-10-CM

## 2024-06-19 DIAGNOSIS — G47.34 NOCTURNAL HYPOXIA: ICD-10-CM

## 2024-06-19 PROCEDURE — 99214 OFFICE O/P EST MOD 30 MIN: CPT | Performed by: INTERNAL MEDICINE

## 2024-06-19 PROCEDURE — 3074F SYST BP LT 130 MM HG: CPT | Performed by: INTERNAL MEDICINE

## 2024-06-19 PROCEDURE — 3078F DIAST BP <80 MM HG: CPT | Performed by: INTERNAL MEDICINE

## 2024-06-19 PROCEDURE — 99212 OFFICE O/P EST SF 10 MIN: CPT | Performed by: INTERNAL MEDICINE

## 2024-06-19 ASSESSMENT — ENCOUNTER SYMPTOMS
COUGH: 0
EYE DISCHARGE: 0
EYE REDNESS: 0
WHEEZING: 0
SPUTUM PRODUCTION: 0
SHORTNESS OF BREATH: 0
PHOTOPHOBIA: 0
DOUBLE VISION: 0
CLAUDICATION: 0
BLURRED VISION: 0
ORTHOPNEA: 0
CONSTIPATION: 0
WEIGHT LOSS: 0
FOCAL WEAKNESS: 0
SINUS PAIN: 0
DIARRHEA: 0
NECK PAIN: 0
HEMOPTYSIS: 0
CHILLS: 0
VOMITING: 0
NAUSEA: 0
DEPRESSION: 0
PND: 0
FEVER: 0
BACK PAIN: 0
STRIDOR: 0
TREMORS: 0
HEARTBURN: 0
DIAPHORESIS: 0
ABDOMINAL PAIN: 0
FALLS: 0
WEAKNESS: 0
SORE THROAT: 0
PALPITATIONS: 0
MYALGIAS: 0
HEADACHES: 0
DIZZINESS: 0
SPEECH CHANGE: 0
EYE PAIN: 0

## 2024-06-19 ASSESSMENT — PATIENT HEALTH QUESTIONNAIRE - PHQ9: CLINICAL INTERPRETATION OF PHQ2 SCORE: 0

## 2024-06-19 ASSESSMENT — FIBROSIS 4 INDEX: FIB4 SCORE: 1.15

## 2024-06-19 NOTE — PROGRESS NOTES
Chief Complaint   Patient presents with    Follow-Up     LAST SEEN 12/18/23    Results     CXR 12/18/23         HPI: This patient is a 83 y.o. male whom is followed in our clinic for COPD last seen by me on 12/18/23.  PMHx is significant for HTN, HFrEF and PVD. He is a former smoker with roughly 50-pack-year history and quit in 2011.  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 No exacerbations and sxs have remained controlled on anoro only. Pulmonary function testing from 2015 showed FEV1 of 2.22 L or 60% predicted with FEV1/FVC ratio of 66.  There was significant bronchodilator response, normal total lung capacity 95% predicted with mild air trapping and elevated DLCO 136% predicted.  Updated PFTs from 7/2021 showed FEV1 of 1.93 L or 58% predicted with normal lung volumes and normal DLCO, not significantly different from 2015.  At our last visit he had developed acute, worse cough but no symptoms of infection.  We had him start airway clearance with nebulized bronchodilators and obtained a chest x-ray which was clear.  Cough has improved significantly since his last visit but does persist.  He has had a slow decline in functional capacity and has recently hired somebody to mow his lawn.  Actually he is mMRC class III.  He does have reduced ejection fraction that is chronic.  No evidence of heart failure decompensation today and his last echo actually showed a mild improvement to an EF of 40%.  He does use supplemental oxygen at night at 2 L/min.    Past Medical History:   Diagnosis Date    Arthritis     Bowel habit changes     constipation     Breath shortness     chronic, uses 2L o2 at night ([facility])     Bronchitis 1960    Chest pain 4/5/2012    Coronary angiogram showed no evidence of CAD.    Chronic obstructive pulmonary disease (HCC)     Disorder of thyroid     hypothyroid     EMPHYSEMA     Heart burn     Hemorrhagic disorder (HCC)     bleeds easily     High  cholesterol     Hypertension     Indigestion     Left bundle branch block     Pain     Right calf pain, started about 3 weeks ago. pt denies swelling.    Pneumonia     PVD (peripheral vascular disease) (HCC)     Tuberculosis 1953    received treatment        Social History     Socioeconomic History    Marital status: Single     Spouse name: Not on file    Number of children: Not on file    Years of education: Not on file    Highest education level: Not on file   Occupational History    Not on file   Tobacco Use    Smoking status: Former     Current packs/day: 0.00     Average packs/day: 1 pack/day for 50.0 years (50.0 ttl pk-yrs)     Types: Cigarettes     Start date: 10/4/1961     Quit date: 10/4/2011     Years since quittin.7    Smokeless tobacco: Never   Vaping Use    Vaping status: Never Used   Substance and Sexual Activity    Alcohol use: No    Drug use: Never    Sexual activity: Not on file   Other Topics Concern    Not on file   Social History Narrative    Not on file     Social Determinants of Health     Financial Resource Strain: Not on file   Food Insecurity: Not on file   Transportation Needs: Not on file   Physical Activity: Not on file   Stress: Not on file   Social Connections: Not on file   Intimate Partner Violence: Not on file   Housing Stability: Not on file       Family History   Problem Relation Age of Onset    Heart Disease Mother     Heart Disease Father        Current Outpatient Medications on File Prior to Visit   Medication Sig Dispense Refill    umeclidinium-vilanterol (ANORO ELLIPTA) 62.5-25 MCG/ACT AEROSOL POWDER, BREATH ACTIVATED inhaler Inhale 1 Puff every day. 3 Each 3    albuterol 108 (90 Base) MCG/ACT Aero Soln inhalation aerosol Inhale 2 Puffs every 6 hours as needed for Shortness of Breath. 8.5 g 6    rosuvastatin (CRESTOR) 5 MG Tab Take 1 Tablet by mouth every evening. 90 Tablet 3    spironolactone (ALDACTONE) 25 MG Tab Take 0.5 Tablets by mouth every day. 45 Tablet 3     sacubitril-valsartan (ENTRESTO) 49-51 MG Tab Take 1 Tablet by mouth 2 times a day. 180 Tablet 1    metoprolol SR (TOPROL XL) 50 MG TABLET SR 24 HR Take 1.5 Tablets by mouth every day. 135 Tablet 3    ipratropium-albuterol (DUONEB) 0.5-2.5 (3) MG/3ML nebulizer solution Take 3 mL by nebulization every four hours as needed for Shortness of Breath. 120 mL 11    ezetimibe (ZETIA) 10 MG Tab Take 1 Tablet by mouth every day. 90 Tablet 3    amoxicillin (AMOXIL) 500 MG Cap Take 2,000 mg by mouth as needed. TAKE 4 CAPSULES BY MOUTH 1 HOUR PRIOR TO DENTAL TREATMENT      levothyroxine (SYNTHROID) 88 MCG Tab Take 88 mcg by mouth every morning on an empty stomach.      DENTA 5000 PLUS 1.1 % Cream Take 1 Application by mouth every evening. USE AS DIRECTED      Polyvinyl Alcohol-Povidone (REFRESH OP) Administer 2 Drops into both eyes 2 times a day.      clonazePAM (KLONOPIN) 0.5 MG Tab Take 1 mg by mouth at bedtime as needed.      Home Care Oxygen Inhale at bedtime. At night      Cholecalciferol (VITAMIN D3) 2000 UNIT TABS Take 1 Tablet by mouth every morning.      aspirin 81 MG EC tablet Take 81 mg by mouth at bedtime.       No current facility-administered medications on file prior to visit.       Atorvastatin, Cat hair extract, and Iodine      ROS:   Review of Systems   Constitutional:  Negative for chills, diaphoresis, fever, malaise/fatigue and weight loss.   HENT:  Negative for congestion, ear discharge, ear pain, hearing loss, nosebleeds, sinus pain, sore throat and tinnitus.    Eyes:  Negative for blurred vision, double vision, photophobia, pain, discharge and redness.   Respiratory:  Negative for cough, hemoptysis, sputum production, shortness of breath, wheezing and stridor.    Cardiovascular:  Negative for chest pain, palpitations, orthopnea, claudication, leg swelling and PND.   Gastrointestinal:  Negative for abdominal pain, constipation, diarrhea, heartburn, nausea and vomiting.   Genitourinary:  Negative for dysuria  "and urgency.   Musculoskeletal:  Negative for back pain, falls, joint pain, myalgias and neck pain.   Skin:  Negative for itching and rash.   Neurological:  Negative for dizziness, tremors, speech change, focal weakness, weakness and headaches.   Endo/Heme/Allergies:  Negative for environmental allergies.   Psychiatric/Behavioral:  Negative for depression.        /56 (BP Location: Right arm, Patient Position: Sitting, BP Cuff Size: Adult)   Pulse 66   Ht 1.854 m (6' 1\")   Wt 115 kg (254 lb)   SpO2 95%   Physical Exam  Vitals reviewed.   Constitutional:       General: He is not in acute distress.     Appearance: Normal appearance. He is normal weight.   HENT:      Head: Normocephalic and atraumatic.      Right Ear: External ear normal.      Left Ear: External ear normal.      Nose: Nose normal. No congestion.      Mouth/Throat:      Mouth: Mucous membranes are moist.      Pharynx: Oropharynx is clear. No oropharyngeal exudate.   Eyes:      General: No scleral icterus.     Extraocular Movements: Extraocular movements intact.      Conjunctiva/sclera: Conjunctivae normal.      Pupils: Pupils are equal, round, and reactive to light.   Cardiovascular:      Rate and Rhythm: Normal rate and regular rhythm.      Heart sounds: Normal heart sounds. No murmur heard.     No gallop.   Pulmonary:      Effort: Pulmonary effort is normal. No respiratory distress.      Breath sounds: Normal breath sounds. No wheezing or rales.   Abdominal:      General: There is no distension.      Palpations: Abdomen is soft.   Musculoskeletal:         General: Normal range of motion.      Cervical back: Normal range of motion and neck supple.      Right lower leg: No edema.      Left lower leg: No edema.   Skin:     General: Skin is warm and dry.      Findings: No rash.   Neurological:      Mental Status: He is alert and oriented to person, place, and time.      Cranial Nerves: No cranial nerve deficit.   Psychiatric:         Mood and " Affect: Mood normal.         Behavior: Behavior normal.         PFTs as reviewed by me personally: As per HPI    Imagaing as reviewed by me personally: As per HPI    Assessment:  1. Chronic obstructive pulmonary disease, unspecified COPD type (HCC)  Referral to Pulmonary Rehab      2. Chronic systolic heart failure (HCC)        3. Former smoker        4. Nocturnal hypoxia            Plan:  Overall this is moderate and has been stable but he has had significant functional capacity decline for which I recommended pulmonary rehab.  Will continue Anoro.  Patient declined follow-up PFTs which would not likely change our management.  He is tobacco free.  Last echo actually showed a mild improvement in his ejection fraction and he appears well compensated today.  As per above, recommend pulmonary rehab.  Tobacco free.  Encouraged ongoing abstinence.  Patient does use supplemental oxygen at 2 L/min which she is both compliant with and benefiting from.  Return in about 6 months (around 12/19/2024) for COPD.

## 2024-07-01 ENCOUNTER — HOSPITAL ENCOUNTER (OUTPATIENT)
Dept: LAB | Facility: MEDICAL CENTER | Age: 84
End: 2024-07-01
Attending: PHYSICIAN ASSISTANT
Payer: COMMERCIAL

## 2024-07-01 LAB — PSA SERPL-MCNC: 5.8 NG/ML (ref 0–4)

## 2024-07-01 PROCEDURE — 84153 ASSAY OF PSA TOTAL: CPT

## 2024-07-01 PROCEDURE — 36415 COLL VENOUS BLD VENIPUNCTURE: CPT

## 2024-07-14 DIAGNOSIS — I73.9 PAD (PERIPHERAL ARTERY DISEASE) (HCC): ICD-10-CM

## 2024-07-14 DIAGNOSIS — Z78.9 STATIN INTOLERANCE: ICD-10-CM

## 2024-07-14 DIAGNOSIS — E78.5 DYSLIPIDEMIA: ICD-10-CM

## 2024-07-19 RX ORDER — EZETIMIBE 10 MG/1
10 TABLET ORAL DAILY
Qty: 90 TABLET | Refills: 3 | Status: SHIPPED | OUTPATIENT
Start: 2024-07-19

## 2024-07-23 ENCOUNTER — HOSPITAL ENCOUNTER (OUTPATIENT)
Dept: LAB | Facility: MEDICAL CENTER | Age: 84
End: 2024-07-23
Attending: STUDENT IN AN ORGANIZED HEALTH CARE EDUCATION/TRAINING PROGRAM
Payer: COMMERCIAL

## 2024-07-23 LAB
25(OH)D3 SERPL-MCNC: 37 NG/ML (ref 30–100)
ALBUMIN SERPL BCP-MCNC: 3.6 G/DL (ref 3.2–4.9)
ALBUMIN/GLOB SERPL: 1.1 G/DL
ALP SERPL-CCNC: 65 U/L (ref 30–99)
ALT SERPL-CCNC: 16 U/L (ref 2–50)
ANION GAP SERPL CALC-SCNC: 10 MMOL/L (ref 7–16)
AST SERPL-CCNC: 22 U/L (ref 12–45)
BASOPHILS # BLD AUTO: 0.9 % (ref 0–1.8)
BASOPHILS # BLD: 0.06 K/UL (ref 0–0.12)
BILIRUB SERPL-MCNC: 0.7 MG/DL (ref 0.1–1.5)
BUN SERPL-MCNC: 15 MG/DL (ref 8–22)
CALCIUM ALBUM COR SERPL-MCNC: 9.2 MG/DL (ref 8.5–10.5)
CALCIUM SERPL-MCNC: 8.9 MG/DL (ref 8.5–10.5)
CHLORIDE SERPL-SCNC: 105 MMOL/L (ref 96–112)
CHOLEST SERPL-MCNC: 106 MG/DL (ref 100–199)
CO2 SERPL-SCNC: 20 MMOL/L (ref 20–33)
CREAT SERPL-MCNC: 1.37 MG/DL (ref 0.5–1.4)
EOSINOPHIL # BLD AUTO: 0.19 K/UL (ref 0–0.51)
EOSINOPHIL NFR BLD: 2.9 % (ref 0–6.9)
ERYTHROCYTE [DISTWIDTH] IN BLOOD BY AUTOMATED COUNT: 47.5 FL (ref 35.9–50)
GFR SERPLBLD CREATININE-BSD FMLA CKD-EPI: 52 ML/MIN/1.73 M 2
GLOBULIN SER CALC-MCNC: 3.2 G/DL (ref 1.9–3.5)
GLUCOSE SERPL-MCNC: 103 MG/DL (ref 65–99)
HCT VFR BLD AUTO: 55.1 % (ref 42–52)
HDLC SERPL-MCNC: 39 MG/DL
HGB BLD-MCNC: 18.5 G/DL (ref 14–18)
IMM GRANULOCYTES # BLD AUTO: 0.02 K/UL (ref 0–0.11)
IMM GRANULOCYTES NFR BLD AUTO: 0.3 % (ref 0–0.9)
LDLC SERPL CALC-MCNC: 46 MG/DL
LYMPHOCYTES # BLD AUTO: 2.45 K/UL (ref 1–4.8)
LYMPHOCYTES NFR BLD: 38 % (ref 22–41)
MCH RBC QN AUTO: 31.6 PG (ref 27–33)
MCHC RBC AUTO-ENTMCNC: 33.6 G/DL (ref 32.3–36.5)
MCV RBC AUTO: 94 FL (ref 81.4–97.8)
MONOCYTES # BLD AUTO: 0.92 K/UL (ref 0–0.85)
MONOCYTES NFR BLD AUTO: 14.3 % (ref 0–13.4)
NEUTROPHILS # BLD AUTO: 2.81 K/UL (ref 1.82–7.42)
NEUTROPHILS NFR BLD: 43.6 % (ref 44–72)
NRBC # BLD AUTO: 0 K/UL
NRBC BLD-RTO: 0 /100 WBC (ref 0–0.2)
PLATELET # BLD AUTO: 201 K/UL (ref 164–446)
PMV BLD AUTO: 10.2 FL (ref 9–12.9)
POTASSIUM SERPL-SCNC: 4.5 MMOL/L (ref 3.6–5.5)
PROT SERPL-MCNC: 6.8 G/DL (ref 6–8.2)
RBC # BLD AUTO: 5.86 M/UL (ref 4.7–6.1)
SODIUM SERPL-SCNC: 135 MMOL/L (ref 135–145)
TRIGL SERPL-MCNC: 107 MG/DL (ref 0–149)
WBC # BLD AUTO: 6.5 K/UL (ref 4.8–10.8)

## 2024-07-23 PROCEDURE — 80053 COMPREHEN METABOLIC PANEL: CPT

## 2024-07-23 PROCEDURE — 84270 ASSAY OF SEX HORMONE GLOBUL: CPT

## 2024-07-23 PROCEDURE — 80061 LIPID PANEL: CPT

## 2024-07-23 PROCEDURE — 85025 COMPLETE CBC W/AUTO DIFF WBC: CPT

## 2024-07-23 PROCEDURE — 84403 ASSAY OF TOTAL TESTOSTERONE: CPT

## 2024-07-23 PROCEDURE — 36415 COLL VENOUS BLD VENIPUNCTURE: CPT

## 2024-07-23 PROCEDURE — 84402 ASSAY OF FREE TESTOSTERONE: CPT

## 2024-07-23 PROCEDURE — 82306 VITAMIN D 25 HYDROXY: CPT

## 2024-07-25 LAB
SHBG SERPL-SCNC: 43 NMOL/L (ref 19–76)
TESTOST FREE MFR SERPL: 1.5 % (ref 1.6–2.9)
TESTOST FREE SERPL-MCNC: 49 PG/ML (ref 47–244)
TESTOST SERPL-MCNC: 317 NG/DL (ref 300–720)

## 2024-08-14 DIAGNOSIS — I50.9 CHF (NYHA CLASS II, ACC/AHA STAGE C) (HCC): ICD-10-CM

## 2024-08-14 DIAGNOSIS — I10 ESSENTIAL HYPERTENSION: ICD-10-CM

## 2024-08-14 DIAGNOSIS — I42.8 NONISCHEMIC CARDIOMYOPATHY (HCC): ICD-10-CM

## 2024-08-14 DIAGNOSIS — I50.20 HFREF (HEART FAILURE WITH REDUCED EJECTION FRACTION) (HCC): ICD-10-CM

## 2024-08-14 RX ORDER — SACUBITRIL AND VALSARTAN 49; 51 MG/1; MG/1
1 TABLET, FILM COATED ORAL 2 TIMES DAILY
Qty: 180 TABLET | Refills: 2 | Status: SHIPPED | OUTPATIENT
Start: 2024-08-14

## 2024-08-14 NOTE — TELEPHONE ENCOUNTER
MAYELIN    Received request via: Pharmacy    Was the patient seen in the last year in this department? Yes 03.20.24    Does the patient have an active prescription (recently filled or refills available) for medication(s) requested? No    Pharmacy Name: [facility]    Does the patient have long term Plus and need 100-day supply? (This applies to ALL medications) Patient does not have SCP    Thank you,     Kenzie MARTIN

## 2024-09-18 ENCOUNTER — HOSPITAL ENCOUNTER (OUTPATIENT)
Dept: LAB | Facility: MEDICAL CENTER | Age: 84
End: 2024-09-18
Attending: NURSE PRACTITIONER
Payer: COMMERCIAL

## 2024-09-18 DIAGNOSIS — I73.9 PAD (PERIPHERAL ARTERY DISEASE) (HCC): ICD-10-CM

## 2024-09-18 DIAGNOSIS — E78.5 DYSLIPIDEMIA: ICD-10-CM

## 2024-09-18 PROCEDURE — 36415 COLL VENOUS BLD VENIPUNCTURE: CPT

## 2024-09-18 PROCEDURE — 80053 COMPREHEN METABOLIC PANEL: CPT

## 2024-09-18 PROCEDURE — 80061 LIPID PANEL: CPT

## 2024-09-19 LAB
ALBUMIN SERPL BCP-MCNC: 4 G/DL (ref 3.2–4.9)
ALBUMIN/GLOB SERPL: 1.4 G/DL
ALP SERPL-CCNC: 68 U/L (ref 30–99)
ALT SERPL-CCNC: 23 U/L (ref 2–50)
ANION GAP SERPL CALC-SCNC: 15 MMOL/L (ref 7–16)
AST SERPL-CCNC: 24 U/L (ref 12–45)
BILIRUB SERPL-MCNC: 0.8 MG/DL (ref 0.1–1.5)
BUN SERPL-MCNC: 16 MG/DL (ref 8–22)
CALCIUM ALBUM COR SERPL-MCNC: 9 MG/DL (ref 8.5–10.5)
CALCIUM SERPL-MCNC: 9 MG/DL (ref 8.5–10.5)
CHLORIDE SERPL-SCNC: 102 MMOL/L (ref 96–112)
CHOLEST SERPL-MCNC: 106 MG/DL (ref 100–199)
CO2 SERPL-SCNC: 21 MMOL/L (ref 20–33)
CREAT SERPL-MCNC: 1.41 MG/DL (ref 0.5–1.4)
FASTING STATUS PATIENT QL REPORTED: NORMAL
GFR SERPLBLD CREATININE-BSD FMLA CKD-EPI: 50 ML/MIN/1.73 M 2
GLOBULIN SER CALC-MCNC: 2.9 G/DL (ref 1.9–3.5)
GLUCOSE SERPL-MCNC: 93 MG/DL (ref 65–99)
HDLC SERPL-MCNC: 42 MG/DL
LDLC SERPL CALC-MCNC: 40 MG/DL
POTASSIUM SERPL-SCNC: 4.8 MMOL/L (ref 3.6–5.5)
PROT SERPL-MCNC: 6.9 G/DL (ref 6–8.2)
SODIUM SERPL-SCNC: 138 MMOL/L (ref 135–145)
TRIGL SERPL-MCNC: 119 MG/DL (ref 0–149)

## 2024-09-23 ENCOUNTER — OFFICE VISIT (OUTPATIENT)
Dept: CARDIOLOGY | Facility: MEDICAL CENTER | Age: 84
End: 2024-09-23
Attending: NURSE PRACTITIONER
Payer: COMMERCIAL

## 2024-09-23 VITALS
RESPIRATION RATE: 16 BRPM | OXYGEN SATURATION: 95 % | HEART RATE: 68 BPM | DIASTOLIC BLOOD PRESSURE: 60 MMHG | HEIGHT: 73 IN | BODY MASS INDEX: 33.43 KG/M2 | WEIGHT: 252.2 LBS | SYSTOLIC BLOOD PRESSURE: 136 MMHG

## 2024-09-23 DIAGNOSIS — I73.9 PAD (PERIPHERAL ARTERY DISEASE) (HCC): ICD-10-CM

## 2024-09-23 DIAGNOSIS — R06.02 SHORTNESS OF BREATH: ICD-10-CM

## 2024-09-23 DIAGNOSIS — I50.9 CHF (NYHA CLASS II, ACC/AHA STAGE C) (HCC): ICD-10-CM

## 2024-09-23 DIAGNOSIS — I42.8 NONISCHEMIC CARDIOMYOPATHY (HCC): ICD-10-CM

## 2024-09-23 DIAGNOSIS — I10 ESSENTIAL HYPERTENSION: ICD-10-CM

## 2024-09-23 DIAGNOSIS — E78.5 DYSLIPIDEMIA: ICD-10-CM

## 2024-09-23 DIAGNOSIS — I50.20 HFREF (HEART FAILURE WITH REDUCED EJECTION FRACTION) (HCC): ICD-10-CM

## 2024-09-23 DIAGNOSIS — Z79.899 HIGH RISK MEDICATION USE: ICD-10-CM

## 2024-09-23 LAB — EKG IMPRESSION: NORMAL

## 2024-09-23 PROCEDURE — 93005 ELECTROCARDIOGRAM TRACING: CPT | Performed by: NURSE PRACTITIONER

## 2024-09-23 PROCEDURE — 93010 ELECTROCARDIOGRAM REPORT: CPT | Performed by: INTERNAL MEDICINE

## 2024-09-23 PROCEDURE — 99213 OFFICE O/P EST LOW 20 MIN: CPT | Performed by: NURSE PRACTITIONER

## 2024-09-23 PROCEDURE — 99214 OFFICE O/P EST MOD 30 MIN: CPT | Performed by: NURSE PRACTITIONER

## 2024-09-23 PROCEDURE — 3078F DIAST BP <80 MM HG: CPT | Performed by: NURSE PRACTITIONER

## 2024-09-23 PROCEDURE — 3075F SYST BP GE 130 - 139MM HG: CPT | Performed by: NURSE PRACTITIONER

## 2024-09-23 ASSESSMENT — ENCOUNTER SYMPTOMS
DIZZINESS: 0
ABDOMINAL PAIN: 0
COUGH: 0
ORTHOPNEA: 0
CLAUDICATION: 0
HEARTBURN: 1
PND: 0
FEVER: 0
SHORTNESS OF BREATH: 1
MYALGIAS: 0
PALPITATIONS: 0

## 2024-09-23 ASSESSMENT — FIBROSIS 4 INDEX: FIB4 SCORE: 2.02

## 2024-09-23 NOTE — PROGRESS NOTES
Chief Complaint   Patient presents with    Follow-Up     F/V Dx: PAD (peripheral artery disease) ([facility])    Congestive Heart Failure     F/V Dx: HFrEF (heart failure with reduced ejection fraction) ([facility])       Subjective     Jason Pearson is a 83 y.o. male who presents today for follow-up on his heart failure, hypertension with his Antoinette perez.    Patient of Dr. Mccall.  He was last seen in clinic on 3/20/2024 with myself.  During that visit, he was recommended to start rosuvastatin 5 mg daily.  He was instructed to slowly titrate up.  He states he has been taking daily without difficulty.    For his symptoms, he is mentioning shortness of breath with exertion, he mentions it feeling like a type of heartburn sensation in his chest.  He denies chest pain, palpitations, orthopnea, PND, edema or dizziness/lightheadedness.    He mentions he does not exercise much due to his back, hips.    He mentions he does volunteer once a week at the [employer].    He does continue to report that he eats out a lot at fast food restaurants.    Patient is not weighing himself at home.    Additionally, patient has the following medical problems:    -Minimal CAD in 2021    -Former smoker, quit 2011    -DLD    -PAD, s/p iliac stent    Past Medical History:   Diagnosis Date    Arthritis     Bowel habit changes     constipation     Breath shortness     chronic, uses 2L o2 at night ([facility])     Bronchitis 1960    Chest pain 4/5/2012    Coronary angiogram showed no evidence of CAD.    Chronic obstructive pulmonary disease (HCC)     Disorder of thyroid     hypothyroid     EMPHYSEMA     Heart burn     Hemorrhagic disorder ([facility])     bleeds easily     High cholesterol     Hypertension     Indigestion     Left bundle branch block     Pain     Right calf pain, started about 3 weeks ago. pt denies swelling.    Pneumonia 2004    PVD (peripheral vascular disease) ([facility])     Tuberculosis 1953    received treatment      Past Surgical History:    Procedure Laterality Date    STENT PLACEMENT  1994    bilateral iliac stent placement.    FOOT SURGERY      OTHER      finger surgery     OTHER      hemorrhoiectomy x2 in 70s or 80s    SINUSOTOMIES      SPINAL FORAMINOTOMY       Family History   Problem Relation Age of Onset    Heart Disease Mother     Heart Disease Father      Social History     Socioeconomic History    Marital status: Single     Spouse name: Not on file    Number of children: Not on file    Years of education: Not on file    Highest education level: Not on file   Occupational History    Not on file   Tobacco Use    Smoking status: Former     Current packs/day: 0.00     Average packs/day: 1 pack/day for 50.0 years (50.0 ttl pk-yrs)     Types: Cigarettes     Start date: 10/4/1961     Quit date: 10/4/2011     Years since quittin.9    Smokeless tobacco: Never   Vaping Use    Vaping status: Never Used   Substance and Sexual Activity    Alcohol use: No    Drug use: Never    Sexual activity: Not on file   Other Topics Concern    Not on file   Social History Narrative    Not on file     Social Determinants of Health     Financial Resource Strain: Not on file   Food Insecurity: Not on file   Transportation Needs: Not on file   Physical Activity: Not on file   Stress: Not on file   Social Connections: Not on file   Intimate Partner Violence: Not on file   Housing Stability: Not on file     Allergies   Allergen Reactions    Atorvastatin     Cat Hair Extract Itching    Iodine      Vomiting      Outpatient Encounter Medications as of 2024   Medication Sig Dispense Refill    sacubitril-valsartan (ENTRESTO) 49-51 MG Tab Take 1 Tablet by mouth 2 times a day. 180 Tablet 2    ezetimibe (ZETIA) 10 MG Tab Take 1 Tablet by mouth every day. 90 Tablet 3    umeclidinium-vilanterol (ANORO ELLIPTA) 62.5-25 MCG/ACT AEROSOL POWDER, BREATH ACTIVATED inhaler Inhale 1 Puff every day. 3 Each 3    albuterol 108 (90 Base) MCG/ACT Aero Soln inhalation aerosol Inhale  "2 Puffs every 6 hours as needed for Shortness of Breath. 8.5 g 6    rosuvastatin (CRESTOR) 5 MG Tab Take 1 Tablet by mouth every evening. 90 Tablet 3    spironolactone (ALDACTONE) 25 MG Tab Take 0.5 Tablets by mouth every day. 45 Tablet 3    metoprolol SR (TOPROL XL) 50 MG TABLET SR 24 HR Take 1.5 Tablets by mouth every day. 135 Tablet 3    ipratropium-albuterol (DUONEB) 0.5-2.5 (3) MG/3ML nebulizer solution Take 3 mL by nebulization every four hours as needed for Shortness of Breath. 120 mL 11    amoxicillin (AMOXIL) 500 MG Cap Take 2,000 mg by mouth as needed. TAKE 4 CAPSULES BY MOUTH 1 HOUR PRIOR TO DENTAL TREATMENT      DENTA 5000 PLUS 1.1 % Cream Take 1 Application by mouth every evening. USE AS DIRECTED      Polyvinyl Alcohol-Povidone (REFRESH OP) Administer 2 Drops into both eyes 2 times a day.      clonazePAM (KLONOPIN) 0.5 MG Tab Take 1 mg by mouth at bedtime as needed.      levothyroxine (SYNTHROID) 88 MCG Tab Take 88 mcg by mouth every morning on an empty stomach.      Home Care Oxygen Inhale at bedtime. At night      Cholecalciferol (VITAMIN D3) 2000 UNIT TABS Take 1 Tablet by mouth every morning.      aspirin 81 MG EC tablet Take 81 mg by mouth at bedtime.       No facility-administered encounter medications on file as of 9/23/2024.     Review of Systems   Constitutional:  Negative for fever.   Respiratory:  Positive for shortness of breath. Negative for cough.    Cardiovascular:  Negative for chest pain, palpitations, orthopnea, claudication, leg swelling and PND.   Gastrointestinal:  Positive for heartburn. Negative for abdominal pain.   Musculoskeletal:  Negative for myalgias.   Neurological:  Negative for dizziness.   All other systems reviewed and are negative.             Objective     /60 (BP Location: Left arm, Patient Position: Sitting, BP Cuff Size: Adult)   Pulse 68   Resp 16   Ht 1.854 m (6' 0.99\")   Wt 114 kg (252 lb 3.2 oz)   SpO2 95%   BMI 33.28 kg/m²     Physical Exam  Vitals " reviewed.   Constitutional:       Appearance: He is well-developed.   HENT:      Head: Normocephalic and atraumatic.   Eyes:      Pupils: Pupils are equal, round, and reactive to light.   Neck:      Vascular: No JVD.   Cardiovascular:      Rate and Rhythm: Normal rate and regular rhythm.      Heart sounds: Normal heart sounds.   Pulmonary:      Effort: Pulmonary effort is normal. No respiratory distress.      Breath sounds: Normal breath sounds. No wheezing or rales.   Abdominal:      General: Bowel sounds are normal.      Palpations: Abdomen is soft.   Musculoskeletal:         General: Normal range of motion.      Cervical back: Normal range of motion and neck supple.      Right lower leg: No edema.      Left lower leg: No edema.   Skin:     General: Skin is warm and dry.   Neurological:      General: No focal deficit present.      Mental Status: He is alert and oriented to person, place, and time.   Psychiatric:         Behavior: Behavior normal.            Lab Results   Component Value Date/Time    CHOLSTRLTOT 106 09/18/2024 11:12 AM    LDL 40 09/18/2024 11:12 AM    HDL 42 09/18/2024 11:12 AM    TRIGLYCERIDE 119 09/18/2024 11:12 AM       Lab Results   Component Value Date/Time    SODIUM 138 09/18/2024 11:12 AM    POTASSIUM 4.8 09/18/2024 11:12 AM    CHLORIDE 102 09/18/2024 11:12 AM    CO2 21 09/18/2024 11:12 AM    GLUCOSE 93 09/18/2024 11:12 AM    BUN 16 09/18/2024 11:12 AM    CREATININE 1.41 (H) 09/18/2024 11:12 AM     Lab Results   Component Value Date/Time    ALKPHOSPHAT 68 09/18/2024 11:12 AM    ASTSGOT 24 09/18/2024 11:12 AM    ALTSGPT 23 09/18/2024 11:12 AM    TBILIRUBIN 0.8 09/18/2024 11:12 AM         Myocardial Perfusion Report 6/22/2021   NUCLEAR IMAGING INTERPRETATION   Small nonreversible defect in the apical septal (LAD) region.     No reversible ischemia.    Global hypokinesis with LVEF 33% (however, LVEF is better assessed via    echocardiogram).      Transthoracic Echo Report 8/10/2021  Mildly  reduced left ventricular systolic function. Left ventricular   ejection fraction is visually estimated to be 45%.   Grade I diastolic dysfunction.  Dyskinesis of the interventricular septum, consistent with left bundle   branch block.   Normal right ventricular size and systolic function.  No significant valvular abnormalities.   No prior study is available for comparison.     Cardiac catheterization 8/17/2021  HEMODYNAMICS:   Aortic pressure: 147/66 mmHg  Pre A-wave pressure: 15 mmHg  No significant aortic gradient on pullback     CORONARY ANGIOGRAPHY:  The left main coronary artery is a short, patent vessel that bifurcates into the left anterior descending and left circumflex coronary arteries.  The left anterior descending coronary artery a large, transapical vessel that supplies a moderate first diagonal branch, a moderate second diagonal branch, and a large third diagonal branch and has minimal luminal irregularities in the distribution.  The left circumflex coronary artery is a large, nondominant vessel that supplies a small first obtuse marginal branch, a moderate second obtuse marginal branch, and a large third obtuse marginal branch and has minimal luminal irregularities in the distribution.  The right coronary artery is a large, dominant vessel that supplies a large posterior descending artery and a large posterolateral system and has minimal luminal irregularities in the distribution.     IMPRESSION:  Minimal luminal coronary artery disease  Borderline elevated left heart filling pressures     RECOMMENDATIONS:  Recover in post procedure unit  TR band release per protocol  Continue optimal medical therapy for nonischemic cardiomyopathy  Aggressive cardiac risk factor management    Transthoracic Echo Report 5/16/2022  The left ventricle is mildly dilated.  The left ventricular ejection fraction is visually estimated to be 35%.  There is abnormal septal motion consistent with underlying conduction  delay.  Grade I diastolic dysfunction.  The right ventricle is normal in size and systolic function.  Unable to estimate pulmonary artery pressure due to an inadequate   tricuspid regurgitant jet.  The left atrium is normal in size.  No significant valvular abnormalities.  Probably normal inferior vena cava size and inspiratory collapse.     Transthoracic Echo Report 8/12/2022  Moderately reduced left ventricular systolic function. The left   ventricular ejection fraction is visually estimated to be 35%. Global   hypokinesis.   Grade I diastolic dysfunction.  The right ventricle is normal in size and systolic function.   Mild mitral regurgitation.  Compared to the prior echo on 5/16/22, there are no significant   changes.     Transthoracic Echo Report 12/16/2022  Compared to the images of the prior study 8/12/22, there has been   improvement in the estimated ejection fraction, previously 35%.  Mildly reduced left ventricular systolic function.  The left ventricular ejection fraction is visually estimated to be 45%.  Global hypokinesis with regional variation.     Assessment & Plan     1. HFrEF (heart failure with reduced ejection fraction) ([facility])  EKG    EC-ECHOCARDIOGRAM COMPLETE W/O CONT    CANCELED: EC-ECHOCARDIOGRAM COMPLETE W/O CONT      2. CHF (NYHA class II, ACC/AHA stage C) ([facility])        3. Essential hypertension        4. Nonischemic cardiomyopathy ([facility])        5. Dyslipidemia        6. PAD (peripheral artery disease) ([facility])        7. High risk medication use        8. Shortness of breath                  Medical Decision Making: Today's Assessment/Status/Plan:          HFmrEF, Stage C, Class 1-2, LVEF 45%: Based on physical examination findings, patient is euvolemic. No JVD, lungs are clear to auscultation, no pitting edema in bilateral lower extremities, no ascites.  -Heart failure due to nonischemic cardiomyopathy  -ACE-I/ARB/ARNI: Continue Entresto 49-51 mg twice a day  -Evidence Based Beta-blocker:  Continue metoprolol SR 75 mg daily  -Aldosterone Antagonist: Continue spironolactone 12.5 mg daily  -Diuretic: At this time, patient to try taking furosemide 20 mg daily as needed  -SGLT2 inhibitor: Previously discussed starting SGLT2 inhibitor, patient declines  -Labs: No additional labs at this time, recent labs reviewed with patient  -No indication for ICD is recent LVEF at 45%  -Repeat echo  -Reinforced s/sx of worsening heart failure with patient and weight monitoring. Pt verbalizes understanding. Pt to call office or RTC if present.    -Start monitoring weights at home daily. Call office if weight increasing greater than 3 lbs in 1 day or greater than 5 lbs in 1 week.    -PUMP line number [phone] (PUMP) reviewed with patient  -Heart Failure Education: Education reinforced, discussed eating out can have increased Sodium.  Reviewed specific foods that he has been eating, recommend alternative options  -Advanced care planning: Advanced directive and POLST to be discussed at future visit  -EKG today shows sinus rhythm 61 with a left bundle branch block  -Did discuss possibly pursuing a stress test if his symptoms do not improve or changes, patient declines, at this time we will pursue echo initially  -Patient did have an angiogram in 2021 which showed minimal luminal CAD     Hypertension: Stable  -Recommendations per above  -Monitor and log Blood pressures at home. Call office or RTC if BP increasing or >180/100 or if symptoms of elevated blood pressure present. Reviewed s/sx of stroke and heart attack. Patient to go to ER or call 911 if present.      Dyslipidemia:  -Last LDL 40 on 9/18/2024  -He has had intolerance with atorvastatin causing muscle aches/joint pain, he is tolerating rosuvastatin  -Continue rosuvastatin 5 mg daily  -Continue ezetimibe 10 mg daily  -Continue aspirin 81 mg daily    PAD, s/p iliac stent:  -ERIBERTO from 8/20/2021 showed mild to moderate arterial disease on right side, no evidence on left  side.  -Continue recommendations per above    FU in clinic in 6 months with Dr. Mccall or myself. Sooner if needed or if testing abnormal    Patient verbalizes understanding and agrees with the plan of care.     PLEASE NOTE: This Note was created using voice recognition Software. I have made every reasonable attempt to correct obvious errors, but I expect that there are errors of grammar and possibly content that I did not discover before finalizing the note

## 2024-10-07 ENCOUNTER — HOSPITAL ENCOUNTER (OUTPATIENT)
Dept: CARDIOLOGY | Facility: MEDICAL CENTER | Age: 84
End: 2024-10-07
Attending: STUDENT IN AN ORGANIZED HEALTH CARE EDUCATION/TRAINING PROGRAM
Payer: COMMERCIAL

## 2024-10-07 DIAGNOSIS — I50.20 HFREF (HEART FAILURE WITH REDUCED EJECTION FRACTION) (HCC): ICD-10-CM

## 2024-10-07 PROCEDURE — 93306 TTE W/DOPPLER COMPLETE: CPT

## 2024-10-09 NOTE — PROCEDURES
Cardiac Catheterization Laboratory Procedure Note    DATE: 8/17/2021    : Dylan Mckenzie MD    PROCEDURES PERFORMED:  1. Left heart catheterization  2. Coronary angiography  3. Moderate conscious sedation    INDICATIONS:  The patient is a 72-year-old gentleman with recently diagnosed left ventricular systolic dysfunction and a mildly abnormal cardiac stress test referred for cardiac catheterization to further evaluate these findings.    CONSENT:  The complete alternatives, risks, and benefits of the procedure were explained to the patient. Signed informed consent was obtained and placed in the chart prior to the procedure.    MEDICATIONS:  1. Lidocaine  2. Fentanyl  3. Midazolam  4. Nitroglycerin  5. Verapamil  6. Heparin    MODERATE CONSCIOUS SEDATION:  I personally supervised the administration of moderate conscious sedation by the nursing staff for 26 minutes.    CONTRAST: Omnipaque 50 cc    ACCESS: 6-[place] Glidesheath in the right radial artery.    ESTIMATED BLOOD LOSS: 10 cc    COMPLICATIONS: None    DESCRIPTION OF PROCEDURE:  The patient was brought to the cardiac catheterization laboratory in the fasting state. The skin over the right wrist was prepped and draped in the usual sterile fashion. Lidocaine infiltration was used to anesthetize the tissue over the right radial artery. Using the micropuncture technique, a 6-[place] Glidesheath was inserted in the right radial artery. A 5-[place] Rod catheter was then advanced over a baby J-wire into the left ventricular cavity where it was gently aspirated, flushed, and then withdrawn across the aortic valve with sequential pressures measured. This catheter was then used to engage the ostium of the right coronary artery and cineangiograms were obtained in multiple projections for complete evaluation of the [facility] coronary system. This catheter was unable to engage the ostium the left main coronary and it was exchanged for a 6-[place] JL-3.5 diagnostic  Detail Level: Detailed catheter.  This catheter was used to engage the ostium of the left main coronary artery and and cineangiograms were obtained in multiple projections for complete evaluation of the left coronary system. Following completion of coronary angiography, all wires, catheters, and sheaths were removed.  A TR band was placed using the patent hemostasis technique.    HEMODYNAMICS:   1. Aortic pressure: 147/66 mmHg  2. Pre A-wave pressure: 15 mmHg  3. No significant aortic gradient on pullback    CORONARY ANGIOGRAPHY:  1. The left main coronary artery is a short, patent vessel that bifurcates into the left anterior descending and left circumflex coronary arteries.  2. The left anterior descending coronary artery a large, transapical vessel that supplies a moderate first diagonal branch, a moderate second diagonal branch, and a large third diagonal branch and has minimal luminal irregularities in the distribution.  3. The left circumflex coronary artery is a large, nondominant vessel that supplies a small first obtuse marginal branch, a moderate second obtuse marginal branch, and a large third obtuse marginal branch and has minimal luminal irregularities in the distribution.  4. The right coronary artery is a large, dominant vessel that supplies a large posterior descending artery and a large posterolateral system and has minimal luminal irregularities in the distribution.    IMPRESSION:  1. Minimal luminal coronary artery disease  2. Borderline elevated left heart filling pressures    RECOMMENDATIONS:  1. Recover in post procedure unit  2. TR band release per protocol  3. Continue optimal medical therapy for nonischemic cardiomyopathy  4. Aggressive cardiac risk factor management

## 2024-10-10 LAB
LV EJECT FRACT MOD 2C 99903: 60.53
LV EJECT FRACT MOD 4C 99902: 51.46
LV EJECT FRACT MOD BP 99901: 54.07

## 2024-10-10 PROCEDURE — 93306 TTE W/DOPPLER COMPLETE: CPT | Mod: 26 | Performed by: STUDENT IN AN ORGANIZED HEALTH CARE EDUCATION/TRAINING PROGRAM

## 2024-12-27 DIAGNOSIS — J44.9 CHRONIC OBSTRUCTIVE PULMONARY DISEASE, UNSPECIFIED COPD TYPE (HCC): ICD-10-CM

## 2024-12-27 NOTE — TELEPHONE ENCOUNTER
Have we ever prescribed this med? Yes.  If yes, what date? 12/18/23    Last OV: 6/19/24 Dr. Henderson    Next OV: no pending appt     DX: Chronic obstructive pulmonary disease, unspecified COPD type (HCC) [J44.9]     Medications: ipratropium-albuterol (DUONEB) 0.5-2.5 (3) MG/3ML nebulizer solution

## 2024-12-30 RX ORDER — IPRATROPIUM BROMIDE AND ALBUTEROL SULFATE 2.5; .5 MG/3ML; MG/3ML
3 SOLUTION RESPIRATORY (INHALATION) EVERY 4 HOURS PRN
Qty: 120 ML | Refills: 11 | Status: SHIPPED | OUTPATIENT
Start: 2024-12-30

## 2024-12-31 ENCOUNTER — APPOINTMENT (OUTPATIENT)
Dept: SLEEP MEDICINE | Facility: MEDICAL CENTER | Age: 84
End: 2024-12-31
Attending: INTERNAL MEDICINE
Payer: MEDICARE

## 2025-01-20 ENCOUNTER — HOSPITAL ENCOUNTER (OUTPATIENT)
Dept: LAB | Facility: MEDICAL CENTER | Age: 85
End: 2025-01-20
Attending: STUDENT IN AN ORGANIZED HEALTH CARE EDUCATION/TRAINING PROGRAM
Payer: COMMERCIAL

## 2025-01-20 ENCOUNTER — HOSPITAL ENCOUNTER (OUTPATIENT)
Dept: LAB | Facility: MEDICAL CENTER | Age: 85
End: 2025-01-20
Attending: PHYSICIAN ASSISTANT
Payer: COMMERCIAL

## 2025-01-20 LAB
25(OH)D3 SERPL-MCNC: 42 NG/ML (ref 30–100)
ALBUMIN SERPL BCP-MCNC: 3.9 G/DL (ref 3.2–4.9)
ALBUMIN SERPL BCP-MCNC: 4 G/DL (ref 3.2–4.9)
ALBUMIN/GLOB SERPL: 1.3 G/DL
ALBUMIN/GLOB SERPL: 1.3 G/DL
ALP SERPL-CCNC: 62 U/L (ref 30–99)
ALP SERPL-CCNC: 63 U/L (ref 30–99)
ALT SERPL-CCNC: 22 U/L (ref 2–50)
ALT SERPL-CCNC: 22 U/L (ref 2–50)
ANION GAP SERPL CALC-SCNC: 8 MMOL/L (ref 7–16)
ANION GAP SERPL CALC-SCNC: 9 MMOL/L (ref 7–16)
AST SERPL-CCNC: 25 U/L (ref 12–45)
AST SERPL-CCNC: 26 U/L (ref 12–45)
BASOPHILS # BLD AUTO: 0.8 % (ref 0–1.8)
BASOPHILS # BLD AUTO: 1.2 % (ref 0–1.8)
BASOPHILS # BLD: 0.07 K/UL (ref 0–0.12)
BASOPHILS # BLD: 0.1 K/UL (ref 0–0.12)
BILIRUB SERPL-MCNC: 0.7 MG/DL (ref 0.1–1.5)
BILIRUB SERPL-MCNC: 0.7 MG/DL (ref 0.1–1.5)
BUN SERPL-MCNC: 17 MG/DL (ref 8–22)
BUN SERPL-MCNC: 17 MG/DL (ref 8–22)
CALCIUM ALBUM COR SERPL-MCNC: 9.3 MG/DL (ref 8.5–10.5)
CALCIUM ALBUM COR SERPL-MCNC: 9.4 MG/DL (ref 8.5–10.5)
CALCIUM SERPL-MCNC: 9.3 MG/DL (ref 8.5–10.5)
CALCIUM SERPL-MCNC: 9.3 MG/DL (ref 8.5–10.5)
CHLORIDE SERPL-SCNC: 104 MMOL/L (ref 96–112)
CHLORIDE SERPL-SCNC: 105 MMOL/L (ref 96–112)
CHOLEST SERPL-MCNC: 110 MG/DL (ref 100–199)
CHOLEST SERPL-MCNC: 111 MG/DL (ref 100–199)
CO2 SERPL-SCNC: 25 MMOL/L (ref 20–33)
CO2 SERPL-SCNC: 25 MMOL/L (ref 20–33)
CREAT SERPL-MCNC: 1.5 MG/DL (ref 0.5–1.4)
CREAT SERPL-MCNC: 1.51 MG/DL (ref 0.5–1.4)
EOSINOPHIL # BLD AUTO: 0.21 K/UL (ref 0–0.51)
EOSINOPHIL # BLD AUTO: 0.21 K/UL (ref 0–0.51)
EOSINOPHIL NFR BLD: 2.5 % (ref 0–6.9)
EOSINOPHIL NFR BLD: 2.6 % (ref 0–6.9)
ERYTHROCYTE [DISTWIDTH] IN BLOOD BY AUTOMATED COUNT: 48.4 FL (ref 35.9–50)
ERYTHROCYTE [DISTWIDTH] IN BLOOD BY AUTOMATED COUNT: 49.3 FL (ref 35.9–50)
GFR SERPLBLD CREATININE-BSD FMLA CKD-EPI: 46 ML/MIN/1.73 M 2
GFR SERPLBLD CREATININE-BSD FMLA CKD-EPI: 46 ML/MIN/1.73 M 2
GLOBULIN SER CALC-MCNC: 3 G/DL (ref 1.9–3.5)
GLOBULIN SER CALC-MCNC: 3 G/DL (ref 1.9–3.5)
GLUCOSE SERPL-MCNC: 98 MG/DL (ref 65–99)
GLUCOSE SERPL-MCNC: 99 MG/DL (ref 65–99)
HCT VFR BLD AUTO: 57.1 % (ref 42–52)
HCT VFR BLD AUTO: 59.5 % (ref 42–52)
HDLC SERPL-MCNC: 45 MG/DL
HDLC SERPL-MCNC: 45 MG/DL
HGB BLD-MCNC: 18.9 G/DL (ref 14–18)
HGB BLD-MCNC: 19.2 G/DL (ref 14–18)
IMM GRANULOCYTES # BLD AUTO: 0.01 K/UL (ref 0–0.11)
IMM GRANULOCYTES # BLD AUTO: 0.03 K/UL (ref 0–0.11)
IMM GRANULOCYTES NFR BLD AUTO: 0.1 % (ref 0–0.9)
IMM GRANULOCYTES NFR BLD AUTO: 0.4 % (ref 0–0.9)
LDLC SERPL CALC-MCNC: 44 MG/DL
LDLC SERPL CALC-MCNC: 44 MG/DL
LYMPHOCYTES # BLD AUTO: 2.68 K/UL (ref 1–4.8)
LYMPHOCYTES # BLD AUTO: 2.79 K/UL (ref 1–4.8)
LYMPHOCYTES NFR BLD: 32.6 % (ref 22–41)
LYMPHOCYTES NFR BLD: 33.3 % (ref 22–41)
MCH RBC QN AUTO: 31.5 PG (ref 27–33)
MCH RBC QN AUTO: 31.7 PG (ref 27–33)
MCHC RBC AUTO-ENTMCNC: 32.3 G/DL (ref 32.3–36.5)
MCHC RBC AUTO-ENTMCNC: 33.1 G/DL (ref 32.3–36.5)
MCV RBC AUTO: 95.6 FL (ref 81.4–97.8)
MCV RBC AUTO: 97.5 FL (ref 81.4–97.8)
MONOCYTES # BLD AUTO: 0.94 K/UL (ref 0–0.85)
MONOCYTES # BLD AUTO: 0.95 K/UL (ref 0–0.85)
MONOCYTES NFR BLD AUTO: 11.2 % (ref 0–13.4)
MONOCYTES NFR BLD AUTO: 11.6 % (ref 0–13.4)
NEUTROPHILS # BLD AUTO: 4.26 K/UL (ref 1.82–7.42)
NEUTROPHILS # BLD AUTO: 4.34 K/UL (ref 1.82–7.42)
NEUTROPHILS NFR BLD: 51.8 % (ref 44–72)
NEUTROPHILS NFR BLD: 51.9 % (ref 44–72)
NRBC # BLD AUTO: 0 K/UL
NRBC # BLD AUTO: 0 K/UL
NRBC BLD-RTO: 0 /100 WBC (ref 0–0.2)
NRBC BLD-RTO: 0 /100 WBC (ref 0–0.2)
NT-PROBNP SERPL IA-MCNC: 443 PG/ML (ref 0–125)
PLATELET # BLD AUTO: 198 K/UL (ref 164–446)
PLATELET # BLD AUTO: 214 K/UL (ref 164–446)
PMV BLD AUTO: 10.2 FL (ref 9–12.9)
PMV BLD AUTO: 10.5 FL (ref 9–12.9)
POTASSIUM SERPL-SCNC: 5.3 MMOL/L (ref 3.6–5.5)
POTASSIUM SERPL-SCNC: 5.3 MMOL/L (ref 3.6–5.5)
PROT SERPL-MCNC: 6.9 G/DL (ref 6–8.2)
PROT SERPL-MCNC: 7 G/DL (ref 6–8.2)
RBC # BLD AUTO: 5.97 M/UL (ref 4.7–6.1)
RBC # BLD AUTO: 6.1 M/UL (ref 4.7–6.1)
SODIUM SERPL-SCNC: 138 MMOL/L (ref 135–145)
SODIUM SERPL-SCNC: 138 MMOL/L (ref 135–145)
TRIGL SERPL-MCNC: 107 MG/DL (ref 0–149)
TRIGL SERPL-MCNC: 108 MG/DL (ref 0–149)
WBC # BLD AUTO: 8.2 K/UL (ref 4.8–10.8)
WBC # BLD AUTO: 8.4 K/UL (ref 4.8–10.8)

## 2025-01-20 PROCEDURE — 84270 ASSAY OF SEX HORMONE GLOBUL: CPT

## 2025-01-20 PROCEDURE — 84402 ASSAY OF FREE TESTOSTERONE: CPT

## 2025-01-20 PROCEDURE — 85025 COMPLETE CBC W/AUTO DIFF WBC: CPT | Mod: 91

## 2025-01-20 PROCEDURE — 36415 COLL VENOUS BLD VENIPUNCTURE: CPT

## 2025-01-20 PROCEDURE — 80061 LIPID PANEL: CPT | Mod: 91

## 2025-01-20 PROCEDURE — 80053 COMPREHEN METABOLIC PANEL: CPT | Mod: 91

## 2025-01-20 PROCEDURE — 82306 VITAMIN D 25 HYDROXY: CPT

## 2025-01-20 PROCEDURE — 80061 LIPID PANEL: CPT

## 2025-01-20 PROCEDURE — 80053 COMPREHEN METABOLIC PANEL: CPT

## 2025-01-20 PROCEDURE — 85025 COMPLETE CBC W/AUTO DIFF WBC: CPT

## 2025-01-20 PROCEDURE — 84403 ASSAY OF TOTAL TESTOSTERONE: CPT

## 2025-01-20 PROCEDURE — 83880 ASSAY OF NATRIURETIC PEPTIDE: CPT

## 2025-01-22 LAB
SHBG SERPL-SCNC: 50 NMOL/L (ref 19–76)
TESTOST FREE MFR SERPL: 1.5 % (ref 1.6–2.9)
TESTOST FREE SERPL-MCNC: 72 PG/ML (ref 47–244)
TESTOST SERPL-MCNC: 488 NG/DL (ref 300–720)

## 2025-01-27 DIAGNOSIS — I10 ESSENTIAL HYPERTENSION: ICD-10-CM

## 2025-01-27 DIAGNOSIS — I42.8 NONISCHEMIC CARDIOMYOPATHY (HCC): ICD-10-CM

## 2025-01-27 DIAGNOSIS — I50.20 HFREF (HEART FAILURE WITH REDUCED EJECTION FRACTION) (HCC): ICD-10-CM

## 2025-01-27 DIAGNOSIS — I50.9 CHF (NYHA CLASS II, ACC/AHA STAGE C) (HCC): ICD-10-CM

## 2025-01-28 RX ORDER — METOPROLOL SUCCINATE 50 MG/1
75 TABLET, EXTENDED RELEASE ORAL DAILY
Qty: 135 TABLET | Refills: 2 | Status: SHIPPED | OUTPATIENT
Start: 2025-01-28

## 2025-01-28 NOTE — TELEPHONE ENCOUNTER
Is the patient due for a refill? Yes    Was the patient seen the last 15 months? yes   Date of last office visit: 9/23/2024    Does the patient have an upcoming appointment?  No   If yes, When? none    Provider to refill:MAYELIN    Does the patient have custodial Plus and need 100-day supply? (This applies to ALL medications) Patient does not have SCP

## 2025-04-09 DIAGNOSIS — J44.9 CHRONIC OBSTRUCTIVE PULMONARY DISEASE, UNSPECIFIED COPD TYPE (HCC): ICD-10-CM

## 2025-04-10 RX ORDER — ALBUTEROL SULFATE 90 UG/1
2 INHALANT RESPIRATORY (INHALATION) EVERY 6 HOURS PRN
Qty: 8.5 EACH | Refills: 6 | Status: SHIPPED | OUTPATIENT
Start: 2025-04-10

## 2025-04-10 NOTE — TELEPHONE ENCOUNTER
Caller Name: Jason Pearson                 Call Back Number: [phone] (home)         Patient approves a detailed voicemail message:     Have we ever prescribed this med? .  If yes, what date?     Last OV: 6/19/24 w/ Dr. Henderson     Next OV:     DX:   Chronic obstructive pulmonary disease, unspecified COPD type   Medications:  : ALBUTEROL HFA (PROAIR) INHALER

## 2025-07-08 ENCOUNTER — HOSPITAL ENCOUNTER (OUTPATIENT)
Dept: LAB | Facility: MEDICAL CENTER | Age: 85
End: 2025-07-08
Attending: PHYSICIAN ASSISTANT
Payer: COMMERCIAL

## 2025-07-08 LAB — PSA SERPL DL<=0.01 NG/ML-MCNC: 4.88 NG/ML (ref 0–4)

## 2025-07-08 PROCEDURE — 36415 COLL VENOUS BLD VENIPUNCTURE: CPT

## 2025-07-08 PROCEDURE — 84153 ASSAY OF PSA TOTAL: CPT

## 2025-08-13 ENCOUNTER — HOSPITAL ENCOUNTER (OUTPATIENT)
Dept: LAB | Facility: MEDICAL CENTER | Age: 85
End: 2025-08-13
Attending: STUDENT IN AN ORGANIZED HEALTH CARE EDUCATION/TRAINING PROGRAM
Payer: COMMERCIAL

## 2025-08-13 LAB
25(OH)D3 SERPL-MCNC: 44 NG/ML (ref 30–100)
ALBUMIN SERPL BCP-MCNC: 3.7 G/DL (ref 3.2–4.9)
ALBUMIN/GLOB SERPL: 1.2 G/DL
ALP SERPL-CCNC: 66 U/L (ref 30–99)
ALT SERPL-CCNC: 23 U/L (ref 2–50)
ANION GAP SERPL CALC-SCNC: 12 MMOL/L (ref 7–16)
AST SERPL-CCNC: 23 U/L (ref 12–45)
BASOPHILS # BLD AUTO: 0.8 % (ref 0–1.8)
BASOPHILS # BLD: 0.08 K/UL (ref 0–0.12)
BILIRUB SERPL-MCNC: 0.8 MG/DL (ref 0.1–1.5)
BUN SERPL-MCNC: 15 MG/DL (ref 8–22)
CALCIUM ALBUM COR SERPL-MCNC: 9.4 MG/DL (ref 8.5–10.5)
CALCIUM SERPL-MCNC: 9.2 MG/DL (ref 8.5–10.5)
CHLORIDE SERPL-SCNC: 106 MMOL/L (ref 96–112)
CHOLEST SERPL-MCNC: 107 MG/DL (ref 100–199)
CO2 SERPL-SCNC: 24 MMOL/L (ref 20–33)
CREAT SERPL-MCNC: 1.62 MG/DL (ref 0.5–1.4)
CREAT UR-MCNC: 235 MG/DL
EOSINOPHIL # BLD AUTO: 0.1 K/UL (ref 0–0.51)
EOSINOPHIL NFR BLD: 1.1 % (ref 0–6.9)
ERYTHROCYTE [DISTWIDTH] IN BLOOD BY AUTOMATED COUNT: 50.3 FL (ref 35.9–50)
GFR SERPLBLD CREATININE-BSD FMLA CKD-EPI: 42 ML/MIN/1.73 M 2
GLOBULIN SER CALC-MCNC: 3.2 G/DL (ref 1.9–3.5)
GLUCOSE SERPL-MCNC: 97 MG/DL (ref 65–99)
HCT VFR BLD AUTO: 56.4 % (ref 42–52)
HDLC SERPL-MCNC: 43 MG/DL
HGB BLD-MCNC: 18.3 G/DL (ref 14–18)
IMM GRANULOCYTES # BLD AUTO: 0.04 K/UL (ref 0–0.11)
IMM GRANULOCYTES NFR BLD AUTO: 0.4 % (ref 0–0.9)
LDLC SERPL CALC-MCNC: 44 MG/DL
LYMPHOCYTES # BLD AUTO: 2.87 K/UL (ref 1–4.8)
LYMPHOCYTES NFR BLD: 30.4 % (ref 22–41)
MCH RBC QN AUTO: 31 PG (ref 27–33)
MCHC RBC AUTO-ENTMCNC: 32.4 G/DL (ref 32.3–36.5)
MCV RBC AUTO: 95.4 FL (ref 81.4–97.8)
MICROALBUMIN UR-MCNC: 5.4 MG/DL
MICROALBUMIN/CREAT UR: 23 MG/G (ref 0–30)
MONOCYTES # BLD AUTO: 1.12 K/UL (ref 0–0.85)
MONOCYTES NFR BLD AUTO: 11.9 % (ref 0–13.4)
NEUTROPHILS # BLD AUTO: 5.24 K/UL (ref 1.82–7.42)
NEUTROPHILS NFR BLD: 55.4 % (ref 44–72)
NRBC # BLD AUTO: 0 K/UL
NRBC BLD-RTO: 0 /100 WBC (ref 0–0.2)
PLATELET # BLD AUTO: 197 K/UL (ref 164–446)
PMV BLD AUTO: 9.7 FL (ref 9–12.9)
POTASSIUM SERPL-SCNC: 5 MMOL/L (ref 3.6–5.5)
PROT SERPL-MCNC: 6.9 G/DL (ref 6–8.2)
RBC # BLD AUTO: 5.91 M/UL (ref 4.7–6.1)
SODIUM SERPL-SCNC: 142 MMOL/L (ref 135–145)
T3FREE SERPL-MCNC: 2.74 PG/ML (ref 2–4.4)
T4 FREE SERPL-MCNC: 1.19 NG/DL (ref 0.93–1.7)
TRIGL SERPL-MCNC: 99 MG/DL (ref 0–149)
TSH SERPL-ACNC: 0.67 UIU/ML (ref 0.38–5.33)
WBC # BLD AUTO: 9.5 K/UL (ref 4.8–10.8)

## 2025-08-13 PROCEDURE — 84481 FREE ASSAY (FT-3): CPT

## 2025-08-13 PROCEDURE — 82306 VITAMIN D 25 HYDROXY: CPT

## 2025-08-13 PROCEDURE — 85025 COMPLETE CBC W/AUTO DIFF WBC: CPT

## 2025-08-13 PROCEDURE — 82043 UR ALBUMIN QUANTITATIVE: CPT

## 2025-08-13 PROCEDURE — 82570 ASSAY OF URINE CREATININE: CPT

## 2025-08-13 PROCEDURE — 80061 LIPID PANEL: CPT

## 2025-08-13 PROCEDURE — 84270 ASSAY OF SEX HORMONE GLOBUL: CPT

## 2025-08-13 PROCEDURE — 80053 COMPREHEN METABOLIC PANEL: CPT

## 2025-08-13 PROCEDURE — 36415 COLL VENOUS BLD VENIPUNCTURE: CPT

## 2025-08-13 PROCEDURE — 84443 ASSAY THYROID STIM HORMONE: CPT

## 2025-08-13 PROCEDURE — 84402 ASSAY OF FREE TESTOSTERONE: CPT

## 2025-08-13 PROCEDURE — 84403 ASSAY OF TOTAL TESTOSTERONE: CPT

## 2025-08-13 PROCEDURE — 84439 ASSAY OF FREE THYROXINE: CPT

## 2025-08-15 LAB
SHBG SERPL-SCNC: 43 NMOL/L (ref 19–76)
TESTOST FREE MFR SERPL: 1.8 % (ref 1.6–2.9)
TESTOST FREE SERPL-MCNC: 152 PG/ML (ref 47–244)
TESTOST SERPL-MCNC: 851 NG/DL (ref 300–720)